# Patient Record
Sex: MALE | Race: WHITE | NOT HISPANIC OR LATINO | Employment: UNEMPLOYED | URBAN - METROPOLITAN AREA
[De-identification: names, ages, dates, MRNs, and addresses within clinical notes are randomized per-mention and may not be internally consistent; named-entity substitution may affect disease eponyms.]

---

## 2017-08-29 ENCOUNTER — PATIENT MESSAGE (OUTPATIENT)
Dept: OTOLARYNGOLOGY | Facility: CLINIC | Age: 37
End: 2017-08-29

## 2017-09-21 ENCOUNTER — OFFICE VISIT (OUTPATIENT)
Dept: INTERNAL MEDICINE | Facility: CLINIC | Age: 37
End: 2017-09-21
Payer: COMMERCIAL

## 2017-09-21 VITALS
WEIGHT: 215.81 LBS | HEIGHT: 70 IN | SYSTOLIC BLOOD PRESSURE: 130 MMHG | BODY MASS INDEX: 30.9 KG/M2 | OXYGEN SATURATION: 98 % | DIASTOLIC BLOOD PRESSURE: 80 MMHG | HEART RATE: 85 BPM

## 2017-09-21 DIAGNOSIS — M54.2 CERVICALGIA: Primary | ICD-10-CM

## 2017-09-21 PROCEDURE — 99999 PR PBB SHADOW E&M-EST. PATIENT-LVL III: CPT | Mod: PBBFAC,,, | Performed by: INTERNAL MEDICINE

## 2017-09-21 PROCEDURE — 3008F BODY MASS INDEX DOCD: CPT | Mod: S$GLB,,, | Performed by: INTERNAL MEDICINE

## 2017-09-21 PROCEDURE — 99213 OFFICE O/P EST LOW 20 MIN: CPT | Mod: S$GLB,,, | Performed by: INTERNAL MEDICINE

## 2017-09-21 NOTE — PROGRESS NOTES
Subjective:       Patient ID: Wilbur Diop is a 37 y.o. male.    Chief Complaint: Mass and Neck Pain    Pt is concerned b/c he thinks he noted a knot in R posterior neck. This has come and gone for a couple of years. I've seen him before and did not seem to be c/w lymphadenopathy. He also saw ENT who also did not feel it was concerning. He denies fever, night sweats, wt loss. He admits to some stress. Area will become larger at times and then go down but is painful. However, it does not feel like a cyst or abscess. No swallowing issues or sore throat.       Review of Systems   Constitutional: Negative for diaphoresis and fever.   HENT: Negative for trouble swallowing.    Respiratory: Negative for shortness of breath.    Cardiovascular: Negative for chest pain.   Psychiatric/Behavioral: Negative for dysphoric mood.       Objective:      Physical Exam   Constitutional: He is oriented to person, place, and time. He appears well-developed and well-nourished.   HENT:   Right Ear: Tympanic membrane, external ear and ear canal normal.   Left Ear: Tympanic membrane, external ear and ear canal normal.   Nose: No mucosal edema or rhinorrhea.   Mouth/Throat: No oropharyngeal exudate or posterior oropharyngeal erythema.   Neck: Neck supple. No thyromegaly present.   No mass apparent in neck at area of concern; no LAD   Cardiovascular: Normal rate, regular rhythm and normal heart sounds.    Pulmonary/Chest: Effort normal and breath sounds normal.   Musculoskeletal: He exhibits no edema.   Lymphadenopathy:     He has no cervical adenopathy.   Neurological: He is alert and oriented to person, place, and time.   Psychiatric: He has a normal mood and affect. His behavior is normal.       Assessment:       1. Cervicalgia        Plan:       1. Suspect this is muscular tension; advised pt to engage in massage, heat, ROM exercises and NSAIDs prn  2. Counseled on signs/symptoms that are worrisome, especially as it relates to neck  masses

## 2017-12-31 ENCOUNTER — OFFICE VISIT (OUTPATIENT)
Dept: URGENT CARE | Facility: CLINIC | Age: 37
End: 2017-12-31
Payer: COMMERCIAL

## 2017-12-31 VITALS
HEIGHT: 71 IN | OXYGEN SATURATION: 97 % | HEART RATE: 75 BPM | DIASTOLIC BLOOD PRESSURE: 82 MMHG | BODY MASS INDEX: 29.4 KG/M2 | WEIGHT: 210 LBS | SYSTOLIC BLOOD PRESSURE: 115 MMHG | TEMPERATURE: 99 F | RESPIRATION RATE: 18 BRPM

## 2017-12-31 DIAGNOSIS — J10.1 INFLUENZA A: ICD-10-CM

## 2017-12-31 DIAGNOSIS — R05.9 COUGH: Primary | ICD-10-CM

## 2017-12-31 LAB
CTP QC/QA: YES
FLUAV AG NPH QL: POSITIVE
FLUBV AG NPH QL: NEGATIVE

## 2017-12-31 PROCEDURE — 99214 OFFICE O/P EST MOD 30 MIN: CPT | Mod: S$GLB,,, | Performed by: FAMILY MEDICINE

## 2017-12-31 PROCEDURE — 87804 INFLUENZA ASSAY W/OPTIC: CPT | Mod: 59,QW,S$GLB, | Performed by: FAMILY MEDICINE

## 2017-12-31 RX ORDER — PROMETHAZINE HYDROCHLORIDE AND CODEINE PHOSPHATE 6.25; 1 MG/5ML; MG/5ML
5 SOLUTION ORAL EVERY 4 HOURS PRN
Qty: 118 ML | Refills: 0 | Status: SHIPPED | OUTPATIENT
Start: 2017-12-31 | End: 2019-01-30

## 2017-12-31 RX ORDER — OSELTAMIVIR PHOSPHATE 75 MG/1
75 CAPSULE ORAL 2 TIMES DAILY
Qty: 10 CAPSULE | Refills: 0 | Status: SHIPPED | OUTPATIENT
Start: 2017-12-31 | End: 2018-01-05

## 2017-12-31 NOTE — PROGRESS NOTES
"Subjective:       Patient ID: Wilbur Diop is a 37 y.o. male.    Vitals:  height is 5' 11" (1.803 m) and weight is 95.3 kg (210 lb). His temperature is 99.1 °F (37.3 °C). His blood pressure is 115/82 and his pulse is 75. His respiration is 18 and oxygen saturation is 97%.     Chief Complaint: Cough    Cough   This is a new problem. The current episode started yesterday. The problem has been unchanged. The problem occurs constantly. The cough is productive of sputum. Associated symptoms include chills and headaches. Pertinent negatives include no chest pain, ear pain, eye redness, fever, myalgias, sore throat, shortness of breath or wheezing. Nothing aggravates the symptoms. He has tried OTC cough suppressant for the symptoms. The treatment provided no relief.     Review of Systems   Constitution: Positive for chills and malaise/fatigue. Negative for fever.   HENT: Positive for congestion. Negative for ear pain, hoarse voice and sore throat.    Eyes: Negative for discharge and redness.   Cardiovascular: Negative for chest pain, dyspnea on exertion and leg swelling.   Respiratory: Positive for cough. Negative for shortness of breath, sputum production and wheezing.    Musculoskeletal: Negative for myalgias.   Gastrointestinal: Negative for abdominal pain and nausea.   Neurological: Positive for headaches.       Objective:      Physical Exam   Constitutional: He is oriented to person, place, and time. He appears well-developed and well-nourished. He is cooperative.  Non-toxic appearance. He appears ill. No distress.   HENT:   Head: Normocephalic and atraumatic.   Right Ear: Hearing, external ear and ear canal normal. A middle ear effusion is present.   Left Ear: Hearing, external ear and ear canal normal. A middle ear effusion is present.   Nose: Nose normal. No mucosal edema, rhinorrhea or nasal deformity. No epistaxis. Right sinus exhibits no maxillary sinus tenderness and no frontal sinus tenderness. Left sinus " exhibits no maxillary sinus tenderness and no frontal sinus tenderness.   Mouth/Throat: Uvula is midline, oropharynx is clear and moist and mucous membranes are normal. No trismus in the jaw. Normal dentition. No uvula swelling. No posterior oropharyngeal erythema.   Eyes: Conjunctivae and lids are normal. No scleral icterus.   Sclera clear bilat   Neck: Trachea normal, full passive range of motion without pain and phonation normal. Neck supple.   Cardiovascular: Normal rate, regular rhythm, normal heart sounds, intact distal pulses and normal pulses.    Pulmonary/Chest: Effort normal and breath sounds normal. No respiratory distress.   Abdominal: Soft. Normal appearance and bowel sounds are normal. He exhibits no distension. There is no tenderness.   Musculoskeletal: Normal range of motion. He exhibits no edema or deformity.   Neurological: He is alert and oriented to person, place, and time. He exhibits normal muscle tone. Coordination normal.   Skin: Skin is warm, dry and intact. He is not diaphoretic. No pallor.   Psychiatric: He has a normal mood and affect. His speech is normal and behavior is normal. Judgment and thought content normal. Cognition and memory are normal.   Nursing note and vitals reviewed.      Assessment:       1. Cough    2. Influenza A        Plan:         Cough  -     POCT Influenza A/B    Influenza A  -     oseltamivir (TAMIFLU) 75 MG capsule; Take 1 capsule (75 mg total) by mouth 2 (two) times daily.  Dispense: 10 capsule; Refill: 0  -     promethazine-codeine 6.25-10 mg/5 ml (PHENERGAN WITH CODEINE) 6.25-10 mg/5 mL syrup; Take 5 mLs by mouth every 4 (four) hours as needed.  Dispense: 118 mL; Refill: 0        Follow Up Comments   Make sure that you follow up with your primary care doctor in the next 2-5 days if needed .  Return to the Urgent Care if signs or symptoms change and certainly if you have worsening symptoms go to the nearest emergency department for further evaluation.

## 2017-12-31 NOTE — PATIENT INSTRUCTIONS
The Flu (Influenza)     The virus that causes the flu spreads through the air in droplets when someone who has the flu coughs, sneezes, laughs, or talks.   The flu (influenza) is an infection that affects your respiratory tract. This tract is made up of your mouth, nose, and lungs, and the passages between them. Unlike a cold, the flu can make you very ill. And it can lead to pneumonia, a serious lung infection. The flu can have serious complications and even cause death.  Who is at risk for the flu?  Anyone can get the flu. But you are more likely to become infected if you:  · Have a weakened immune system  · Work in a healthcare setting where you may be exposed to flu germs  · Live or work with someone who has the flu  · Havent had an annual flu shot  How does the flu spread?  The flu is caused by a virus. The virus spreads through the air in droplets when someone who has the flu coughs, sneezes, laughs, or talks. You can become infected when you inhale these viruses directly. You can also become infected when you touch a surface on which the droplets have landed and then transfer the germs to your eyes, nose, or mouth. Touching used tissues, or sharing utensils, drinking glasses, or a toothbrush from an infected person can expose you to flu viruses, too.  What are the symptoms of the flu?  Flu symptoms tend to come on quickly and may last a few days to a few weeks. They include:  · Fever usually higher than 100.4°F  (38°C) and chills  · Sore throat and headache  · Dry cough  · Runny nose  · Tiredness and weakness  · Muscle aches  Who is at risk for flu complications?  For some people, the flu can be very serious. The risk for complications is greater for:  · Children younger than age 5  · Adults ages 65 and older  · People with a chronic illness such as diabetes or heart, kidney, or lung disease  · People who live in a nursing home or long-term care facility   How is the flu treated?  The flu usually gets  better after 7 days or so. In some cases, your healthcare provider may prescribe an antiviral medicine. This may help you get well a little sooner. For the medicine to help, you need to take it as soon as possible (ideally within 48 hours) after your symptoms start. If you develop pneumonia or other serious illness, you may need to stay in the hospital.  Easing flu symptoms  · Drink lots of fluids such as water, juice, and warm soup. A good rule is to drink enough so that you urinate your normal amount.  · Get plenty of rest.  · Ask your healthcare provider what to take for fever and pain.  · Call your provider if your fever is 100.4°F (38°C) or higher, or you become dizzy, lightheaded, or short of breath.  Taking steps to protect others  · Wash your hands often, especially after coughing or sneezing. Or clean your hands with an alcohol-based hand  containing at least 60% alcohol.  · Cough or sneeze into a tissue. Then throw the tissue away and wash your hands. If you dont have a tissue, cough and sneeze into your elbow.  · Stay home until at least 24 hours after you no longer have a fever or chills. Be sure the fever isnt being hidden by fever-reducing medicine.  · Dont share food, utensils, drinking glasses, or a toothbrush with others.  · Ask your healthcare provider if others in your household should get antiviral medicine to help them avoid infection.  How can the flu be prevented?  · One of the best ways to avoid the flu is to get a flu vaccine each year. The virus that causes the flu changes from year to year. For that reason, healthcare providers recommend getting the flu vaccine each year, as soon as it's available in your area. The vaccine is given as a shot. Your healthcare provider can tell you which vaccine is right for you. A nasal spray is also available but is not recommended for the 3834-5319 flu season. The CDC says this is because the nasal spray did not seem to protect against the flu  over the last several flu seasons. In the past, it was meant for people ages 2 to 49.  · Wash your hands often. Frequent handwashing is a proven way to help prevent infection.  · Carry an alcohol-based hand gel containing at least 60% alcohol. Use it when you can't use soap and water. Then wash your hands as soon as you can.  · Avoid touching your eyes, nose, and mouth.  · At home and work, clean phones, computer keyboards, and toys often with disinfectant wipes.  · If possible, avoid close contact with others who have the flu or symptoms of the flu.  Handwashing tips  Handwashing is one of the best ways to prevent many common infections. If you are caring for or visiting someone with the flu, wash your hands each time you enter and leave the room. Follow these steps:  · Use warm water and plenty of soap. Rub your hands together well.  · Clean the whole hand, including under your nails, between your fingers, and up the wrists.  · Wash for at least 15 seconds.  · Rinse, letting the water run down your fingers, not up your wrists.  · Dry your hands well. Use a paper towel to turn off the faucet and open the door.  Using alcohol-based hand   Alcohol-based hand  are also a good choice. Use them when you can't use soap and water. Follow these steps:  · Squeeze about a tablespoon of gel into the palm of one hand.  · Rub your hands together briskly, cleaning the backs of your hands, the palms, between your fingers, and up the wrists.  · Rub until the gel is gone and your hands are completely dry.  Preventing the flu in healthcare settings  The flu is a special concern for people in hospitals and long-term care facilities. To help prevent the spread of flu, many hospitals and nursing homes take these steps:  · Healthcare providers wash their hands or use an alcohol-based hand  before and after treating each patient.  · People with the flu have private rooms and bathrooms or share a room with someone  with the same infection.  · People who are at high risk for the flu but don't have it are encouraged to get the flu and pneumonia vaccines.  · All healthcare workers are encouraged or required to get flu shots.   Date Last Reviewed: 12/1/2016 © 2000-2017 [employer]. [address] [place] [address]. All rights reserved. This information is not intended as a substitute for professional medical care. Always follow your healthcare professional's instructions.        Influenza     Viruses that cause influenza spread through the air in droplets when someone who has the flu coughs, sneezes, laughs, or talks.   Influenza (the flu) is an infection that affects your respiratory tract. This tract is made up of your mouth, nose, and lungs, and the passages between them. Unlike a cold, the flu can make you very ill. And it can lead to pneumonia, a serious lung infection. The flu can have serious complications and even be fatal for some people. These include older adults, young children, and people with certain chronic conditions.  Who is at risk for the flu?  Anyone can get the flu. But you are more likely to become infected if you:  · Have a weakened immune system  · Work in a healthcare setting where you may be exposed to flu germs  · Live or work with someone who has the flu  · Havent had an annual flu shot  How does the flu spread?  The flu is caused by viruses. The viruses spread through the air in droplets when someone who has the flu coughs, sneezes, laughs, or talks. You can become infected when you inhale these viruses directly. You can also become infected when you touch a surface on which the droplets have landed and then transfer the germs to your eyes, nose, or mouth. Touching used tissues, or sharing utensils, drinking glasses, or a toothbrush with an infected person can expose you to flu viruses, too.  What are the symptoms of the flu?  Flu symptoms tend to come on quickly and may last  a few days to a few weeks. They include:  · Fever usually higher than 100.4°F  (38°C) and chills  · Sore throat and headache  · Dry cough  · Runny nose  · Tiredness and weakness  · Muscle aches  Things that make the flu worse  For some people, the flu can be very serious. The risk for complications is greater for:  · Children younger than age 5  · Adults ages 65 and older  · People with a chronic illness such as diabetes or heart, kidney, or lung disease  · People who live in a nursing home or long-term care facility   How is the flu treated?  The flu usually gets better after 7 days or so. In some cases, your healthcare provider may prescribe an antiviral medicine. This may help you get well sooner. For the medicine to help, you need to take it as soon as possible (ideally within 48 hours) after your symptoms start. If you develop pneumonia or other serious illness, you may need to stay in the hospital.  Easing flu symptoms  · Drink lots of fluids such as water, juice, and warm soup. A good rule is to drink enough so that you urinate your normal amount.  · Get plenty of rest.  · Ask your healthcare provider what to take for fever and pain.  · Call your provider if your fever is 100.4°F (38°C) or higher, or you become dizzy, lightheaded, or short of breath.  Taking steps to protect others  · Wash your hands often, especially after coughing or sneezing. Or clean your hands with an alcohol-based hand  containing at least 60% alcohol.  · Cough or sneeze into a tissue. Then throw the tissue away and wash your hands. If you dont have a tissue, cough and sneeze into the crook of your elbow.  · Stay home until at least 24 hours after you no longer have a fever or chills. Be sure the fever isnt being hidden by fever-reducing medicine.  · Dont share food, utensils, drinking glasses, or a toothbrush with others.  · Ask your healthcare provider if others in your household should get antiviral medicine to help them  avoid infection.  How can the flu be prevented?  · One of the best ways to avoid the flu is to get a flu vaccine each year. Viruses that cause the flu change from year to year. For that reason, doctors recommend getting the flu vaccine each year, as soon as it's available in your area. The vaccine may be given as a shot or as a nasal spray. Your healthcare provider can tell you which vaccine is right for you. The nasal spray is not recommended for the 7989-8717 flu season. The CDC says this is because the nasal spray did not seem to protect against the flu over the last several flu seasons. In the past, it was meant for people ages 2 to 49.  · Wash your hands often. Frequent handwashing is a proven way to help prevent infection.  · Carry an alcohol-based hand gel containing at least 60% alcohol. Use it when you can't use soap and water. Then wash your hands as soon as you can.  · Avoid touching your eyes, nose, and mouth.  · At home and work, clean phones, computer keyboards, and toys often with disinfectant wipes.  · If possible, avoid close contact with others who have the flu or symptoms of the flu.  Handwashing tips  Handwashing is one of the best ways to prevent many common infections. If you are caring for or visiting someone with the flu, wash your hands each time you enter and leave the room. Follow these steps:  · Use warm water and plenty of soap. Rub your hands together well.  · Clean the whole hand, under your nails, between your fingers, and up the wrists.  · Wash for at least 15 seconds.  · Rinse, letting the water run down your fingers, not up your wrists.  · Dry your hands well. Use a paper towel to turn off the faucet and open the door.  Using alcohol-based hand   Alcohol-based hand  are also a good choice. Use them when you can't use soap and water. Follow these steps:  · Squeeze about a tablespoon of gel into the palm of one hand.  · Rub your hands together briskly, cleaning the  backs of your hands, the palms, between your fingers, and up the wrists.  · Rub until the gel is gone and your hands are completely dry.  Preventing influenza in healthcare settings  The flu is a special concern for people in hospitals and long-term care facilities. To help prevent the spread of flu, many hospitals and nursing homes take these steps:  · Healthcare providers wash their hands or use an alcohol-based hand  before and after treating each patient.  · People with the flu have private rooms and bathrooms or share a room with someone with the same infection.  · People at high-risk for the flu but don't have it are encouraged to get the flu and pneumonia vaccines.  · All healthcare workers are encouraged or required to get flu shots.   Date Last Reviewed: 8/27/2014  © 2014-0487 [employer]. [address] [place] [address], [place] [address]. All rights reserved. This information is not intended as a substitute for professional medical care. Always follow your healthcare professional's instructions.        Please drink plenty of fluids.  Please get plenty of rest.  Please return here or go to the Emergency Department for any concerns or worsening of condition.  Tamiflu prescription has been discussed and if prescribed, please take to completion unless you cannot tolerate the side effects.   If you were prescribed a narcotic medication, do not drive or operate heavy equipment or machinery while taking these medications.  If you were given a steroid shot in the clinic and have also been given a prescription for a steroid such as Prednisone or a Medrol Dose Pack, please begin taking them tomorrow.  If you do not have Hypertension or any history of palpitations, it is ok to take over the counter Sudafed or Mucinex D or Allegra-D or Claritin-D or Zyrtec-D.  If you do take one of the above, it is ok to combine that with plain over the counter Mucinex or Allegra or Claritin or Zyrtec.  If for example you  are taking Zyrtec -D, you can combine that with Mucinex, but not Mucinex-D.  If you are taking Mucinex-D, you can combine that with plain Allegra or Claritin or Zyrtec.   If you do have Hypertension or palpitations, it is safe to take Coricidin HBP for relief of sinus symptoms.  If not allergic, please take over the counter Tylenol (Acetaminophen) and/or Motrin (Ibuprofen) as directed for control of pain and/or fever.  Please follow up with your primary care doctor or specialist as needed.    If you  smoke, please stop smoking.

## 2017-12-31 NOTE — LETTER
December 31, 2017      [facility] [place] [address]  Phone: [phone]  Fax: [phone]       Patient: Wilbur Diop   YOB: 1980  Date of Visit: 12/31/2017    To Whom It May Concern:    Tiera Diop  was at [facility] on 12/31/2017. He may return to work/school on 01/03/2017 with no restrictions. If you have any questions or concerns, or if I can be of further assistance, please do not hesitate to contact me.    Sincerely,    Mya Martinez MD

## 2018-08-18 ENCOUNTER — HOSPITAL ENCOUNTER (EMERGENCY)
Facility: HOSPITAL | Age: 38
Discharge: HOME | End: 2018-08-18
Attending: EMERGENCY MEDICINE
Payer: COMMERCIAL

## 2018-08-18 VITALS
OXYGEN SATURATION: 98 % | DIASTOLIC BLOOD PRESSURE: 88 MMHG | RESPIRATION RATE: 20 BRPM | HEART RATE: 58 BPM | WEIGHT: 215 LBS | HEIGHT: 70 IN | BODY MASS INDEX: 30.78 KG/M2 | SYSTOLIC BLOOD PRESSURE: 131 MMHG | TEMPERATURE: 98 F
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
## 2018-08-18 PROCEDURE — 99283 EMERGENCY DEPT VISIT LOW MDM: CPT

## 2018-08-18 PROCEDURE — 99282 EMERGENCY DEPT VISIT SF MDM: CPT | Mod: ,,, | Performed by: EMERGENCY MEDICINE

## 2018-08-18 NOTE — ED PROVIDER NOTES
"Encounter Date: 2018    SCRIBE #1 NOTE: I, Son Cathryn, am scribing for, and in the presence of,  Dr. High. I have scribed the entire note.       History     Chief Complaint   Patient presents with    Insect Bite     insect bite to back of right leg with moderate pain. pt states its very tender and thinks its a spider. pt is aox 3.      Time patient was seen by the provider: 6:13 AM      The patient is a 38 y.o. male with co-morbidities including: allergy who presents to the ED with a complaint of an insect bite on his right leg that itches. Pt is a . He notes that he fell and an insect bit him. Last night, he reports of having the spot of being "hard as a rock."        The history is provided by the patient and medical records.     Review of patient's allergies indicates:  No Known Allergies  Past Medical History:   Diagnosis Date    Allergy     Anxiety     Depression      Past Surgical History:   Procedure Laterality Date    APPENDECTOMY      HERNIA REPAIR       Family History   Problem Relation Age of Onset    Depression Mother     No Known Problems Father     No Known Problems Sister     No Known Problems Brother     No Known Problems Maternal Aunt     No Known Problems Maternal Uncle     No Known Problems Paternal Aunt     No Known Problems Paternal Uncle     No Known Problems Maternal Grandmother     No Known Problems Maternal Grandfather     No Known Problems Paternal Grandmother     No Known Problems Paternal Grandfather     Amblyopia Neg Hx     Blindness Neg Hx     Cancer Neg Hx     Cataracts Neg Hx     Diabetes Neg Hx     Glaucoma Neg Hx     Hypertension Neg Hx     Macular degeneration Neg Hx     Retinal detachment Neg Hx     Strabismus Neg Hx     Stroke Neg Hx     Thyroid disease Neg Hx      Social History     Tobacco Use    Smoking status: Former Smoker     Last attempt to quit: 10/22/2004     Years since quittin.8    Smokeless tobacco: Never Used "   Substance Use Topics    Alcohol use: Yes     Alcohol/week: 1.0 oz     Types: 2 Standard drinks or equivalent per week    Drug use: No     Review of Systems   Constitutional: Negative for chills, diaphoresis and fever.   HENT: Negative for facial swelling.    Eyes: Negative for visual disturbance.   Respiratory: Negative for shortness of breath.    Cardiovascular: Negative for chest pain.   Gastrointestinal: Negative for abdominal pain.   Genitourinary: Negative for dysuria.   Musculoskeletal: Negative for back pain.   Skin:        Positive erythema on right leg   Neurological: Negative for headaches.       Physical Exam     Initial Vitals   BP Pulse Resp Temp SpO2   08/18/18 0459 08/18/18 0459 08/18/18 0459 08/18/18 0604 08/18/18 0459   131/88 (!) 58 20 98.4 °F (36.9 °C) 98 %      MAP       --                Physical Exam    Nursing note and vitals reviewed.  Constitutional: He appears well-nourished.   HENT:   Mouth/Throat: Mucous membranes are normal.   Musculoskeletal: He exhibits tenderness (Mild tenderness right leg ).   Skin: There is erythema (4cm x 5cm ).   Negative for fluctuance, drainage, warmth         ED Course   Procedures  Labs Reviewed - No data to display       Imaging Results    None          Medical Decision Making:   Initial Assessment:   37 yo m, healthy, here with R lower leg insect bite, pronounced allergic response but no signs infection/cellulitis/abscess at this time  ED Management:  Supportive care  OTC hydrocortisone and benadryl            Scribe Attestation:   Scribe #1: I performed the above scribed service and the documentation accurately describes the services I performed. I attest to the accuracy of the note.               Clinical Impression:   The encounter diagnosis was Insect bite, initial encounter.      Disposition:   Disposition: Discharged  Condition: Stable       I, Dr. Gillian High, personally performed the services described in this documentation. All medical record  entries made by the scribe were at my direction and in my presence.  I have reviewed the chart and agree that the record reflects my personal performance and is accurate and complete. Gillian High MD.  6:35 AM 08/19/2018                     Gillian High MD  08/19/18 0807

## 2018-08-18 NOTE — ED TRIAGE NOTES
Pt presents to the ED with c/o insect bite to back of right leg with moderate pain. 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 Pt denies fever/chills, N/V/D, chest pain, SOB. Pt is AOx4.     Patient identifiers verified and correct  LOC: The patient is awake, alert and aware of environment with an appropriate affect, the patient is oriented x 3 and speaking appropriately.   APPEARANCE: Patient appears comfortable and in no acute distress, patient is clean and well groomed.  SKIN: The skin is warm and dry, color consistent with ethnicity, patient has normal skin turgor and moist mucus membranes, skin intact, no breakdown or bruising noted.   MUSCULOSKELETAL: Patient moving all extremities spontaneously, no swelling noted. Insect bite to back of right leg.  RESPIRATORY: Airway is open and patent, respirations are spontaneous, patient has a normal effort and rate, no accessory muscle use noted

## 2018-11-04 ENCOUNTER — OFFICE VISIT (OUTPATIENT)
Dept: URGENT CARE | Facility: CLINIC | Age: 38
End: 2018-11-04
Payer: COMMERCIAL

## 2018-11-04 VITALS
OXYGEN SATURATION: 97 % | DIASTOLIC BLOOD PRESSURE: 87 MMHG | RESPIRATION RATE: 18 BRPM | BODY MASS INDEX: 31.5 KG/M2 | SYSTOLIC BLOOD PRESSURE: 133 MMHG | WEIGHT: 220 LBS | HEIGHT: 70 IN | TEMPERATURE: 99 F | HEART RATE: 76 BPM

## 2018-11-04 DIAGNOSIS — J06.9 URI, ACUTE: ICD-10-CM

## 2018-11-04 DIAGNOSIS — H65.07 RECURRENT ACUTE SEROUS OTITIS MEDIA, UNSPECIFIED LATERALITY: Primary | ICD-10-CM

## 2018-11-04 DIAGNOSIS — J30.1 SEASONAL ALLERGIC RHINITIS DUE TO POLLEN: ICD-10-CM

## 2018-11-04 PROCEDURE — 3008F BODY MASS INDEX DOCD: CPT | Mod: CPTII,S$GLB,, | Performed by: FAMILY MEDICINE

## 2018-11-04 PROCEDURE — 99213 OFFICE O/P EST LOW 20 MIN: CPT | Mod: S$GLB,,, | Performed by: FAMILY MEDICINE

## 2018-11-04 RX ORDER — AMOXICILLIN 875 MG/1
875 TABLET, FILM COATED ORAL 2 TIMES DAILY
Qty: 20 TABLET | Refills: 0 | Status: SHIPPED | OUTPATIENT
Start: 2018-11-04 | End: 2018-11-14

## 2018-11-04 NOTE — PROGRESS NOTES
"Subjective:       Patient ID: Wilbur Diop is a 38 y.o. male.    Vitals:  height is 5' 10" (1.778 m) and weight is 99.8 kg (220 lb). His tympanic temperature is 98.7 °F (37.1 °C). His blood pressure is 133/87 and his pulse is 76. His respiration is 18 and oxygen saturation is 97%.     Chief Complaint: Sinus Problem (2 weeks)    Pt went to his dr last Friday he was given a steroid shot and amox, but didn't get the rx filled      Sinus Problem   This is a new problem. The current episode started in the past 7 days. There has been no fever. He is experiencing no pain. Associated symptoms include congestion, ear pain, headaches, sinus pressure and sneezing. Pertinent negatives include no chills, coughing, hoarse voice, shortness of breath or sore throat. Past treatments include acetaminophen and oral decongestants. The treatment provided moderate relief.     Review of Systems   Constitution: Negative for chills, fever and malaise/fatigue.   HENT: Positive for congestion, ear pain, sinus pressure and sneezing. Negative for hoarse voice and sore throat.    Eyes: Negative for discharge and redness.   Cardiovascular: Negative for chest pain, dyspnea on exertion and leg swelling.   Respiratory: Negative for cough, shortness of breath, sputum production and wheezing.    Musculoskeletal: Negative for myalgias.   Gastrointestinal: Positive for nausea. Negative for abdominal pain.   Neurological: Positive for headaches.       Objective:      Physical Exam   Constitutional: He is oriented to person, place, and time. He appears well-developed and well-nourished. He is cooperative.  Non-toxic appearance. He does not appear ill. No distress.   HENT:   Head: Normocephalic and atraumatic.   Right Ear: Hearing, tympanic membrane, external ear and ear canal normal.   Left Ear: Hearing, tympanic membrane, external ear and ear canal normal.   Nose: Nose normal. No mucosal edema, rhinorrhea or nasal deformity. No epistaxis. Right sinus " exhibits no maxillary sinus tenderness and no frontal sinus tenderness. Left sinus exhibits no maxillary sinus tenderness and no frontal sinus tenderness.   Mouth/Throat: Uvula is midline, oropharynx is clear and moist and mucous membranes are normal. No trismus in the jaw. Normal dentition. No uvula swelling. No posterior oropharyngeal erythema.   TMs fluid, no erythema     Eyes: Conjunctivae and lids are normal. Right eye exhibits no discharge. Left eye exhibits no discharge. No scleral icterus.   Sclera clear bilat   Neck: Trachea normal, normal range of motion, full passive range of motion without pain and phonation normal. Neck supple.   Cardiovascular: Normal rate, regular rhythm, normal heart sounds, intact distal pulses and normal pulses.   Pulmonary/Chest: Effort normal and breath sounds normal. No respiratory distress.   Abdominal: Soft. Normal appearance and bowel sounds are normal. He exhibits no distension, no pulsatile midline mass and no mass. There is no tenderness.   Musculoskeletal: Normal range of motion. He exhibits no edema or deformity.   Neurological: He is alert and oriented to person, place, and time. He exhibits normal muscle tone. Coordination normal.   Skin: Skin is warm, dry and intact. He is not diaphoretic. No pallor.   Psychiatric: He has a normal mood and affect. His speech is normal and behavior is normal. Judgment and thought content normal. Cognition and memory are normal.   Nursing note and vitals reviewed.      Assessment:       1. Recurrent acute serous otitis media, unspecified laterality    2. URI, acute    3. Seasonal allergic rhinitis due to pollen        Plan:         Recurrent acute serous otitis media, unspecified laterality    URI, acute    Seasonal allergic rhinitis due to pollen    Other orders  -     amoxicillin (AMOXIL) 875 MG tablet; Take 1 tablet (875 mg total) by mouth 2 (two) times daily. for 10 days  Dispense: 20 tablet; Refill: 0

## 2018-11-21 ENCOUNTER — TELEPHONE (OUTPATIENT)
Dept: INTERNAL MEDICINE | Facility: CLINIC | Age: 38
End: 2018-11-21

## 2018-12-24 PROBLEM — J10.1 INFLUENZA A: Status: RESOLVED | Noted: 2017-12-31 | Resolved: 2018-12-24

## 2019-01-30 ENCOUNTER — LAB VISIT (OUTPATIENT)
Dept: LAB | Facility: OTHER | Age: 39
End: 2019-01-30
Attending: INTERNAL MEDICINE
Payer: COMMERCIAL

## 2019-01-30 ENCOUNTER — OFFICE VISIT (OUTPATIENT)
Dept: INTERNAL MEDICINE | Facility: CLINIC | Age: 39
End: 2019-01-30
Payer: COMMERCIAL

## 2019-01-30 VITALS
HEART RATE: 72 BPM | HEIGHT: 70 IN | SYSTOLIC BLOOD PRESSURE: 120 MMHG | BODY MASS INDEX: 31.37 KG/M2 | WEIGHT: 219.13 LBS | DIASTOLIC BLOOD PRESSURE: 78 MMHG

## 2019-01-30 DIAGNOSIS — B35.4 TINEA CORPORIS: ICD-10-CM

## 2019-01-30 DIAGNOSIS — F90.0 ATTENTION DEFICIT HYPERACTIVITY DISORDER (ADHD), PREDOMINANTLY INATTENTIVE TYPE: ICD-10-CM

## 2019-01-30 DIAGNOSIS — Z00.00 WELLNESS EXAMINATION: ICD-10-CM

## 2019-01-30 DIAGNOSIS — F32.0 CURRENT MILD EPISODE OF MAJOR DEPRESSIVE DISORDER WITHOUT PRIOR EPISODE: ICD-10-CM

## 2019-01-30 DIAGNOSIS — Z00.00 WELLNESS EXAMINATION: Primary | ICD-10-CM

## 2019-01-30 LAB
ALBUMIN SERPL BCP-MCNC: 4 G/DL
ALP SERPL-CCNC: 54 U/L
ALT SERPL W/O P-5'-P-CCNC: 33 U/L
ANION GAP SERPL CALC-SCNC: 11 MMOL/L
AST SERPL-CCNC: 20 U/L
BASOPHILS # BLD AUTO: 0.03 K/UL
BASOPHILS NFR BLD: 0.4 %
BILIRUB SERPL-MCNC: 0.4 MG/DL
BUN SERPL-MCNC: 20 MG/DL
CALCIUM SERPL-MCNC: 9.4 MG/DL
CHLORIDE SERPL-SCNC: 104 MMOL/L
CHOLEST SERPL-MCNC: 205 MG/DL
CHOLEST/HDLC SERPL: 3.9 {RATIO}
CO2 SERPL-SCNC: 25 MMOL/L
CREAT SERPL-MCNC: 0.9 MG/DL
DIFFERENTIAL METHOD: NORMAL
EOSINOPHIL # BLD AUTO: 0.3 K/UL
EOSINOPHIL NFR BLD: 3.4 %
ERYTHROCYTE [DISTWIDTH] IN BLOOD BY AUTOMATED COUNT: 12.8 %
EST. GFR  (AFRICAN AMERICAN): >60 ML/MIN/1.73 M^2
EST. GFR  (NON AFRICAN AMERICAN): >60 ML/MIN/1.73 M^2
ESTIMATED AVG GLUCOSE: 117 MG/DL
GLUCOSE SERPL-MCNC: 106 MG/DL
HBA1C MFR BLD HPLC: 5.7 %
HCT VFR BLD AUTO: 46 %
HDLC SERPL-MCNC: 52 MG/DL
HDLC SERPL: 25.4 %
HGB BLD-MCNC: 15.5 G/DL
LDLC SERPL CALC-MCNC: 139.8 MG/DL
LYMPHOCYTES # BLD AUTO: 2.2 K/UL
LYMPHOCYTES NFR BLD: 30.3 %
MCH RBC QN AUTO: 29.8 PG
MCHC RBC AUTO-ENTMCNC: 33.7 G/DL
MCV RBC AUTO: 88 FL
MONOCYTES # BLD AUTO: 0.8 K/UL
MONOCYTES NFR BLD: 10.7 %
NEUTROPHILS # BLD AUTO: 4 K/UL
NEUTROPHILS NFR BLD: 54.9 %
NONHDLC SERPL-MCNC: 153 MG/DL
PLATELET # BLD AUTO: 317 K/UL
PMV BLD AUTO: 10.3 FL
POTASSIUM SERPL-SCNC: 4.1 MMOL/L
PROT SERPL-MCNC: 7.4 G/DL
RBC # BLD AUTO: 5.21 M/UL
SODIUM SERPL-SCNC: 140 MMOL/L
TRIGL SERPL-MCNC: 66 MG/DL
WBC # BLD AUTO: 7.27 K/UL

## 2019-01-30 PROCEDURE — 99396 PR PREVENTIVE VISIT,EST,40-64: ICD-10-PCS | Mod: 25,S$GLB,, | Performed by: INTERNAL MEDICINE

## 2019-01-30 PROCEDURE — 80061 LIPID PANEL: CPT

## 2019-01-30 PROCEDURE — 90471 IMMUNIZATION ADMIN: CPT | Mod: S$GLB,,, | Performed by: INTERNAL MEDICINE

## 2019-01-30 PROCEDURE — 99396 PREV VISIT EST AGE 40-64: CPT | Mod: 25,S$GLB,, | Performed by: INTERNAL MEDICINE

## 2019-01-30 PROCEDURE — 83036 HEMOGLOBIN GLYCOSYLATED A1C: CPT

## 2019-01-30 PROCEDURE — 80053 COMPREHEN METABOLIC PANEL: CPT

## 2019-01-30 PROCEDURE — 90471 FLU VACCINE (QUAD) GREATER THAN OR EQUAL TO 3YO PRESERVATIVE FREE IM: ICD-10-PCS | Mod: S$GLB,,, | Performed by: INTERNAL MEDICINE

## 2019-01-30 PROCEDURE — 99999 PR PBB SHADOW E&M-EST. PATIENT-LVL III: CPT | Mod: PBBFAC,,, | Performed by: INTERNAL MEDICINE

## 2019-01-30 PROCEDURE — 36415 COLL VENOUS BLD VENIPUNCTURE: CPT

## 2019-01-30 PROCEDURE — 90686 IIV4 VACC NO PRSV 0.5 ML IM: CPT | Mod: S$GLB,,, | Performed by: INTERNAL MEDICINE

## 2019-01-30 PROCEDURE — 99999 PR PBB SHADOW E&M-EST. PATIENT-LVL III: ICD-10-PCS | Mod: PBBFAC,,, | Performed by: INTERNAL MEDICINE

## 2019-01-30 PROCEDURE — 85025 COMPLETE CBC W/AUTO DIFF WBC: CPT

## 2019-01-30 PROCEDURE — 90686 FLU VACCINE (QUAD) GREATER THAN OR EQUAL TO 3YO PRESERVATIVE FREE IM: ICD-10-PCS | Mod: S$GLB,,, | Performed by: INTERNAL MEDICINE

## 2019-01-30 RX ORDER — KETOCONAZOLE 20 MG/G
CREAM TOPICAL 2 TIMES DAILY
Qty: 60 G | Refills: 1 | Status: SHIPPED | OUTPATIENT
Start: 2019-01-30 | End: 2020-03-19

## 2019-01-30 RX ORDER — KETOCONAZOLE 20 MG/G
CREAM TOPICAL 2 TIMES DAILY
Qty: 60 G | Refills: 1 | Status: SHIPPED | OUTPATIENT
Start: 2019-01-30 | End: 2019-01-30

## 2019-01-30 NOTE — PROGRESS NOTES
Subjective:       Patient ID: Wilbur Diop is a 38 y.o. male.    Chief Complaint: Annual Exam    Pt here for annual exam. Counseled on exercise goals.    Lexapro works well for depression. No SI/HI.    ADHD doing well with adderall and not having side effects. This is Rx by psychiatry. Specifically denies cp/sob/palpitaitons/anorexia/wt loss/insomnia. LA  reviewed and is consistent. It is effective for his ADHD symptoms.         Review of Systems   Constitutional: Negative for appetite change, fatigue, fever and unexpected weight change.   HENT: Negative for congestion, rhinorrhea and sore throat.    Eyes: Negative for visual disturbance.   Respiratory: Negative for cough and shortness of breath.    Cardiovascular: Negative for chest pain, palpitations and leg swelling.   Gastrointestinal: Negative for abdominal pain, blood in stool, constipation and diarrhea.   Genitourinary: Negative for difficulty urinating and dysuria.   Skin: Negative for color change and rash.   Neurological: Negative for dizziness and syncope.   Hematological: Negative for adenopathy.   Psychiatric/Behavioral: Negative for dysphoric mood.       Objective:      Physical Exam   Constitutional: He is oriented to person, place, and time. He appears well-developed and well-nourished.   HENT:   Head: Normocephalic and atraumatic.   Right Ear: External ear normal.   Left Ear: External ear normal.   Mouth/Throat: Oropharynx is clear and moist. No oropharyngeal exudate.   Eyes: Conjunctivae and EOM are normal. Pupils are equal, round, and reactive to light.   Neck: Neck supple. Carotid bruit is not present. No thyromegaly present.   Cardiovascular: Normal rate, regular rhythm, S1 normal, S2 normal, normal heart sounds and intact distal pulses.   Pulmonary/Chest: Effort normal and breath sounds normal.   Abdominal: Soft. Bowel sounds are normal. He exhibits no mass. There is no hepatosplenomegaly. There is no tenderness.   Musculoskeletal: He  exhibits no edema.   Lymphadenopathy:     He has no cervical adenopathy.   Neurological: He is alert and oriented to person, place, and time. No cranial nerve deficit.   Skin:   Macular erythematous patch on chest with slight scale and central clearing c/w tinea   Psychiatric: He has a normal mood and affect. His behavior is normal.       Assessment:       1. Wellness examination    2. Attention deficit hyperactivity disorder (ADHD), predominantly inattentive type    3. Current mild episode of major depressive disorder without prior episode    4. Tinea corporis        Plan:       1. Appropriate labs  2. nizoral cream  3. Keep f/u with psychiatry

## 2019-01-30 NOTE — PROGRESS NOTES
"Patient was given vaccine information sheet for the Flu Vaccine. The area of injection was palpated using the acromion process as a landmark. This area was cleaned with alcohol. Using a 25g 1" safety needle, 0.5mL of the vaccine was placed into the left deltoid muscle. The injection site was dressed with a bandage. Patient experienced no complications and was discharged in stable condition. Fluzone vaccine Lot: [id number]   Exp: [id number]    "

## 2019-09-17 ENCOUNTER — OFFICE VISIT (OUTPATIENT)
Dept: INTERNAL MEDICINE | Facility: CLINIC | Age: 39
End: 2019-09-17
Payer: COMMERCIAL

## 2019-09-17 VITALS
HEART RATE: 87 BPM | HEIGHT: 70 IN | DIASTOLIC BLOOD PRESSURE: 88 MMHG | OXYGEN SATURATION: 98 % | WEIGHT: 207.44 LBS | SYSTOLIC BLOOD PRESSURE: 130 MMHG | BODY MASS INDEX: 29.7 KG/M2

## 2019-09-17 DIAGNOSIS — G56.03 BILATERAL CARPAL TUNNEL SYNDROME: Primary | ICD-10-CM

## 2019-09-17 DIAGNOSIS — F90.0 ATTENTION DEFICIT HYPERACTIVITY DISORDER (ADHD), PREDOMINANTLY INATTENTIVE TYPE: ICD-10-CM

## 2019-09-17 DIAGNOSIS — F32.0 CURRENT MILD EPISODE OF MAJOR DEPRESSIVE DISORDER WITHOUT PRIOR EPISODE: ICD-10-CM

## 2019-09-17 PROCEDURE — 99999 PR PBB SHADOW E&M-EST. PATIENT-LVL IV: CPT | Mod: PBBFAC,,, | Performed by: INTERNAL MEDICINE

## 2019-09-17 PROCEDURE — 99213 OFFICE O/P EST LOW 20 MIN: CPT | Mod: S$GLB,,, | Performed by: INTERNAL MEDICINE

## 2019-09-17 PROCEDURE — 3008F PR BODY MASS INDEX (BMI) DOCUMENTED: ICD-10-PCS | Mod: CPTII,S$GLB,, | Performed by: INTERNAL MEDICINE

## 2019-09-17 PROCEDURE — 99999 PR PBB SHADOW E&M-EST. PATIENT-LVL IV: ICD-10-PCS | Mod: PBBFAC,,, | Performed by: INTERNAL MEDICINE

## 2019-09-17 PROCEDURE — 99213 PR OFFICE/OUTPT VISIT, EST, LEVL III, 20-29 MIN: ICD-10-PCS | Mod: S$GLB,,, | Performed by: INTERNAL MEDICINE

## 2019-09-17 PROCEDURE — 3008F BODY MASS INDEX DOCD: CPT | Mod: CPTII,S$GLB,, | Performed by: INTERNAL MEDICINE

## 2019-09-17 NOTE — PROGRESS NOTES
Subjective:       Patient ID: Wilbur Diop is a 39 y.o. male.    Chief Complaint: Numbness (Fingertips, especially at night)    Pt c/o fingertips that are numb; especially worse at night. It involves bilat 1st-3rd digits. No pain. 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 Symptoms have been occurring for 6-7 months; was not sudden in onset. He has not taken anything for it nor used any braces. No neck pain.     Pt reports increase in depression. He was seeing psychiatry for this and ADHD and on meds but decided on his own to stop adderall b/c he didn't like the way it made him feel. He has not yet d/w psychiatry and would like to consider changing providers. He is also open to psychology. No SI/HI.            Review of Systems   Constitutional: Negative for fever.   Respiratory: Negative for shortness of breath.    Cardiovascular: Negative for chest pain.   Neurological: Positive for weakness and numbness.   Psychiatric/Behavioral: Positive for dysphoric mood. The patient is nervous/anxious.        Objective:      Physical Exam   Constitutional: He is oriented to person, place, and time. He appears well-developed and well-nourished.   Neck: Neck supple. No thyromegaly present.   Cardiovascular: Normal rate, regular rhythm and normal heart sounds.   Pulmonary/Chest: Effort normal and breath sounds normal.   Musculoskeletal:   No muscular atrophy in hands; strength normal and intact throughout hands; positive Tinel bilat wrists   Lymphadenopathy:     He has no cervical adenopathy.   Neurological: He is alert and oriented to person, place, and time.   Psychiatric: He has a normal mood and affect. His behavior is normal.       Assessment:       1. Bilateral carpal tunnel syndrome    2. Current mild episode of major depressive disorder without prior episode    3. Attention deficit hyperactivity disorder (ADHD), predominantly inattentive type        Plan:       1. Psychiatry referral  2. Ortho hand  referral  3. Will try wrist splints first and use NSAIDs cautiously prn

## 2019-09-18 ENCOUNTER — PATIENT OUTREACH (OUTPATIENT)
Dept: ADMINISTRATIVE | Facility: OTHER | Age: 39
End: 2019-09-18

## 2019-09-19 ENCOUNTER — TELEPHONE (OUTPATIENT)
Dept: ORTHOPEDICS | Facility: CLINIC | Age: 39
End: 2019-09-19

## 2019-09-19 ENCOUNTER — PATIENT MESSAGE (OUTPATIENT)
Dept: ORTHOPEDICS | Facility: CLINIC | Age: 39
End: 2019-09-19

## 2019-09-19 DIAGNOSIS — M79.641 BILATERAL HAND PAIN: Primary | ICD-10-CM

## 2019-09-19 DIAGNOSIS — M79.642 BILATERAL HAND PAIN: Primary | ICD-10-CM

## 2019-09-23 ENCOUNTER — OFFICE VISIT (OUTPATIENT)
Dept: URGENT CARE | Facility: CLINIC | Age: 39
End: 2019-09-23
Payer: COMMERCIAL

## 2019-09-23 VITALS
SYSTOLIC BLOOD PRESSURE: 138 MMHG | HEIGHT: 70 IN | TEMPERATURE: 99 F | WEIGHT: 207 LBS | RESPIRATION RATE: 18 BRPM | HEART RATE: 63 BPM | DIASTOLIC BLOOD PRESSURE: 88 MMHG | OXYGEN SATURATION: 98 % | BODY MASS INDEX: 29.63 KG/M2

## 2019-09-23 DIAGNOSIS — H65.91 OTITIS MEDIA WITH EFFUSION, RIGHT: Primary | ICD-10-CM

## 2019-09-23 DIAGNOSIS — Z76.0 MEDICATION REFILL: ICD-10-CM

## 2019-09-23 PROCEDURE — 96372 THER/PROPH/DIAG INJ SC/IM: CPT | Mod: S$GLB,,, | Performed by: PHYSICIAN ASSISTANT

## 2019-09-23 PROCEDURE — 96372 PR INJECTION,THERAP/PROPH/DIAG2ST, IM OR SUBCUT: ICD-10-PCS | Mod: S$GLB,,, | Performed by: PHYSICIAN ASSISTANT

## 2019-09-23 PROCEDURE — 3008F BODY MASS INDEX DOCD: CPT | Mod: CPTII,S$GLB,, | Performed by: PHYSICIAN ASSISTANT

## 2019-09-23 PROCEDURE — 99213 OFFICE O/P EST LOW 20 MIN: CPT | Mod: 25,S$GLB,, | Performed by: PHYSICIAN ASSISTANT

## 2019-09-23 PROCEDURE — 99213 PR OFFICE/OUTPT VISIT, EST, LEVL III, 20-29 MIN: ICD-10-PCS | Mod: 25,S$GLB,, | Performed by: PHYSICIAN ASSISTANT

## 2019-09-23 PROCEDURE — 3008F PR BODY MASS INDEX (BMI) DOCUMENTED: ICD-10-PCS | Mod: CPTII,S$GLB,, | Performed by: PHYSICIAN ASSISTANT

## 2019-09-23 RX ORDER — BUPROPION HYDROCHLORIDE 300 MG/1
TABLET ORAL
Qty: 30 TABLET | Refills: 0 | Status: SHIPPED | OUTPATIENT
Start: 2019-09-23 | End: 2020-03-19

## 2019-09-23 RX ORDER — ESCITALOPRAM OXALATE 20 MG/1
40 TABLET ORAL DAILY
Qty: 180 TABLET | Refills: 0 | Status: SHIPPED | OUTPATIENT
Start: 2019-09-23 | End: 2019-12-27

## 2019-09-23 RX ORDER — BETAMETHASONE SODIUM PHOSPHATE AND BETAMETHASONE ACETATE 3; 3 MG/ML; MG/ML
6 INJECTION, SUSPENSION INTRA-ARTICULAR; INTRALESIONAL; INTRAMUSCULAR; SOFT TISSUE
Status: COMPLETED | OUTPATIENT
Start: 2019-09-23 | End: 2019-09-23

## 2019-09-23 RX ORDER — OXYMETAZOLINE HCL 0.05 %
2 SPRAY, NON-AEROSOL (ML) NASAL 2 TIMES DAILY
Qty: 15 ML | Refills: 0 | Status: SHIPPED | OUTPATIENT
Start: 2019-09-23 | End: 2019-10-03

## 2019-09-23 RX ADMIN — BETAMETHASONE SODIUM PHOSPHATE AND BETAMETHASONE ACETATE 6 MG: 3; 3 INJECTION, SUSPENSION INTRA-ARTICULAR; INTRALESIONAL; INTRAMUSCULAR; SOFT TISSUE at 06:09

## 2019-09-23 NOTE — PATIENT INSTRUCTIONS
Earache, No Infection (Adult)  Earaches can happen without an infection. This occurs when air and fluid build up behind the eardrum causing a feeling of fullness and discomfort and reduced hearing. This is called otitis media with effusion (OME) or serous otitis media. It means there is fluid in the middle ear. It is not the same as acute otitis media, which is typically from infection.  OME can happen when you have a cold if congestion blocks the passage that drains the middle ear. This passage is called the eustachian tube. OME may also occur with nasal allergies or after a bacterial middle ear infection.    The pain or discomfort may come and go. You may hear clicking or popping sounds when you chew or swallow. You may feel that your balance is off. Or you may hear ringing in the ear.  It often takes from several weeks up to 3 months for the fluid to clear on its own. Oral pain relievers and ear drops help if there is pain. Decongestants and antihistamines sometimes help. Antibiotics don't help since there is no infection. Your doctor may prescribe a nasal spray to help reduce swelling in the nose and eustachian tube. This can allow the ear to drain.  If your OME doesn't improve after 3 months, surgery may be used to drain the fluid and insert a small tube in the eardrum to allow continued drainage.  Because the middle ear fluid can become infected, it is important to watch for signs of an ear infection which may develop later. These signs include increased ear pain, fever, or drainage from the ear.  Home care  The following guidelines will help you care for yourself at home:  · You may use over-the-counter medicine as directed to control pain, unless another medicine was prescribed. If you have chronic liver or kidney disease or ever had a stomach ulcer or GI bleeding, talk with your doctor before using these medicines. Aspirin should never be used in anyone under 18 years of age who is ill with a fever. It  may cause severe liver damage.  · You may use over-the-counter decongestants such as phenylephrine or pseudoephedrine. But they are not always helpful. Don't use nasal spray decongestants more than 3 days. Longer use can make congestion worse. Prescription nasal sprays from your doctor don't typically have those restrictions.  · Antihistamines may help if you are also having allergy symptoms.  · You may use medicines such as guaifenesin to thin mucus and promote drainage.  Follow-up care  Follow up with your healthcare provider or as advised if you are not feeling better after 3 days.  When to seek medical advice  Call your healthcare provider right away if any of the following occur:  · Your ear pain gets worse or does not start to improve   · Fever of 100.4°F (38°C) or higher, or as directed by your healthcare provider  · Fluid or blood draining from the ear  · Headache or sinus pain  · Stiff neck  · Unusual drowsiness or confusion  Date Last Reviewed: 10/1/2016  © 9261-7196 [employer]. [address] [place] [address]. All rights reserved. This information is not intended as a substitute for professional medical care. Always follow your healthcare professional's instructions.        Depression: Tips to Help Yourself  As your healthcare providers help treat your depression, you can also help yourself. Keep in mind that your illness affects you emotionally, physically, mentally, and socially. So full recovery will take time. Take care of your body and your soul, and be patient with yourself as you get better.    Self-care  · Educate yourself. Read about treatment and medicine options. If you have the energy, attend local conferences or support groups. Keep a list of useful websites and helpful books and use them as needed. This illness is not your fault. Dont blame yourself for your depression.  · Manage early symptoms. If you notice symptoms returning, experience triggers, or identify  other factors that may lead to a depressive episode, get help as soon as possible. Ask trusted friends and family to monitor your behavior and let you know if they see anything of concern.  · Work with your provider. Find a provider you can trust. Communicate honestly with that person and share information on your treatment for depression and your reaction to medicines.  · Be prepared for a crisis. Know what to do if you experience a crisis. Keep the phone number of a crisis hotline and know the location of your community's urgent care centers and the closest emergency department.  · Hold off on big decisions. Depression can cloud your judgment. So wait until you feel better before making major life decisions, such as changing jobs, moving, or getting  or .  · Be patient. Recovering from depression is a process. Dont be discouraged if it takes some time to feel better.  · Keep it simple. Depression saps your energy and concentration. So you wont be able to do all the things you used to do. Set small goals and do what you can.  · Be with others. Dont isolate yourself--youll only feel worse. Try to be with other people. And take part in fun activities when you can. Go to a movie, ballgame, [employer] service, or social event. Talk openly with people you can trust. And accept help when its offered.  Take care of your body  People with depression often lose the desire to take care of themselves. That only makes their problems worse. During treatment and afterward, make a point to:  · Exercise. Its a great way to take care of your body. And studies have shown that exercise helps fight depression.  · Avoid drugs and alcohol. These may ease the pain in the short term. But theyll only make your problems worse in the long run.  · Get relief from stress. Ask your healthcare provider for relaxation exercises and techniques to help relieve stress.  · Eat right. A balanced and healthy diet helps keep your  body healthy.  Date Last Reviewed: 1/1/2017  © 5590-1526 [employer]. [address] [place] [address], [place] [address]. All rights reserved. This information is not intended as a substitute for professional medical care. Always follow your healthcare professional's instructions.    Please follow up with your Primary care provider within 2-5 days if your signs and symptoms have not resolved or worsen.     If your condition worsens or fails to improve we recommend that you receive another evaluation at the emergency room immediately or contact your primary medical clinic to discuss your concerns.   You must understand that you have received an Urgent Care treatment only and that you may be released before all of your medical problems are known or treated. You, the patient, will arrange for follow up care as instructed.     RED FLAGS/WARNING SYMPTOMS DISCUSSED WITH PATIENT THAT WOULD WARRANT EMERGENT MEDICAL ATTENTION. PATIENT VERBALIZED UNDERSTANDING.

## 2019-09-23 NOTE — PROGRESS NOTES
"Subjective:       Patient ID: Wilbur Diop is a 39 y.o. male.    Vitals:  height is 5' 10" (1.778 m) and weight is 93.9 kg (207 lb). His temperature is 99 °F (37.2 °C). His blood pressure is 138/88 and his pulse is 63. His respiration is 18 and oxygen saturation is 98%.     Chief Complaint: Medication Refill    Pt need med  Refill.    Medication Refill   This is a new problem. The current episode started today. The problem occurs constantly. The problem has been unchanged. Pertinent negatives include no arthralgias, chest pain, chills, congestion, coughing, fatigue, fever, headaches, joint swelling, myalgias, nausea, rash, sore throat, vertigo or vomiting. Nothing aggravates the symptoms. He has tried nothing for the symptoms.       Constitution: Negative for chills, fatigue and fever.   HENT: Negative for congestion and sore throat.    Neck: Negative for painful lymph nodes.   Cardiovascular: Negative for chest pain and leg swelling.   Eyes: Negative for double vision and blurred vision.   Respiratory: Negative for cough and shortness of breath.    Gastrointestinal: Negative for nausea, vomiting and diarrhea.   Genitourinary: Negative for dysuria, frequency and urgency.   Musculoskeletal: Negative for joint pain, joint swelling, muscle cramps and muscle ache.   Skin: Negative for color change, pale and rash.   Allergic/Immunologic: Negative for seasonal allergies.   Neurological: Negative for dizziness, history of vertigo, light-headedness, passing out and headaches.   Hematologic/Lymphatic: Negative for swollen lymph nodes, easy bruising/bleeding and history of blood clots. Does not bruise/bleed easily.   Psychiatric/Behavioral: Negative for nervous/anxious, sleep disturbance and depression. The patient is not nervous/anxious.        Objective:      Physical Exam   Constitutional: He is oriented to person, place, and time. He appears well-developed and well-nourished. He is cooperative.  Non-toxic appearance. He " does not appear ill. No distress.   HENT:   Head: Normocephalic and atraumatic.   Right Ear: Hearing, external ear and ear canal normal. A middle ear effusion is present.   Left Ear: Hearing, external ear and ear canal normal. A middle ear effusion is present.   Nose: Nose normal. No mucosal edema, rhinorrhea or nasal deformity. No epistaxis. Right sinus exhibits no maxillary sinus tenderness and no frontal sinus tenderness. Left sinus exhibits no maxillary sinus tenderness and no frontal sinus tenderness.   Mouth/Throat: Uvula is midline, oropharynx is clear and moist and mucous membranes are normal. No trismus in the jaw. Normal dentition. No uvula swelling. No posterior oropharyngeal erythema.   Eyes: Conjunctivae and lids are normal. Right eye exhibits no discharge. Left eye exhibits no discharge. No scleral icterus.   Sclera clear bilat   Neck: Trachea normal, normal range of motion, full passive range of motion without pain and phonation normal. Neck supple.   Cardiovascular: Normal rate, regular rhythm, normal heart sounds, intact distal pulses and normal pulses.   Pulmonary/Chest: Effort normal and breath sounds normal. No respiratory distress.   Abdominal: Soft. Normal appearance and bowel sounds are normal. He exhibits no distension, no pulsatile midline mass and no mass. There is no tenderness.   Musculoskeletal: Normal range of motion. He exhibits no edema or deformity.   Neurological: He is alert and oriented to person, place, and time. He exhibits normal muscle tone. Coordination normal.   Skin: Skin is warm, dry and intact. He is not diaphoretic. No pallor.   Psychiatric: He has a normal mood and affect. His speech is normal and behavior is normal. Judgment and thought content normal. Cognition and memory are normal. He expresses no homicidal and no suicidal ideation. He expresses no suicidal plans and no homicidal plans.   Nursing note and vitals reviewed.      Assessment:       1. Otitis media with  effusion, right    2. Medication refill        Plan:         Otitis media with effusion, right  -     sodium chloride (SALINE NASAL) 0.65 % nasal spray; 1 spray by Nasal route as needed for Congestion.  Dispense: 50 mL; Refill: 0  -     oxymetazoline (AFRIN) 0.05 % nasal spray; 2 sprays by Nasal route 2 (two) times daily. for 3 days  Dispense: 15 mL; Refill: 0  -     betamethasone acetate-betamethasone sodium phosphate injection 6 mg    Medication refill  -     buPROPion (WELLBUTRIN XL) 300 MG 24 hr tablet; Take 1 tablet by mouth every morning  Dispense: 30 tablet; Refill: 0  -     escitalopram oxalate (LEXAPRO) 20 MG tablet; Take 2 tablets (40 mg total) by mouth once daily.  Dispense: 180 tablet; Refill: 0          Earache, No Infection (Adult)  Earaches can happen without an infection. This occurs when air and fluid build up behind the eardrum causing a feeling of fullness and discomfort and reduced hearing. This is called otitis media with effusion (OME) or serous otitis media. It means there is fluid in the middle ear. It is not the same as acute otitis media, which is typically from infection.  OME can happen when you have a cold if congestion blocks the passage that drains the middle ear. This passage is called the eustachian tube. OME may also occur with nasal allergies or after a bacterial middle ear infection.    The pain or discomfort may come and go. You may hear clicking or popping sounds when you chew or swallow. You may feel that your balance is off. Or you may hear ringing in the ear.  It often takes from several weeks up to 3 months for the fluid to clear on its own. Oral pain relievers and ear drops help if there is pain. Decongestants and antihistamines sometimes help. Antibiotics don't help since there is no infection. Your doctor may prescribe a nasal spray to help reduce swelling in the nose and eustachian tube. This can allow the ear to drain.  If your OME doesn't improve after 3 months,  surgery may be used to drain the fluid and insert a small tube in the eardrum to allow continued drainage.  Because the middle ear fluid can become infected, it is important to watch for signs of an ear infection which may develop later. These signs include increased ear pain, fever, or drainage from the ear.  Home care  The following guidelines will help you care for yourself at home:  · You may use over-the-counter medicine as directed to control pain, unless another medicine was prescribed. If you have chronic liver or kidney disease or ever had a stomach ulcer or GI bleeding, talk with your doctor before using these medicines. Aspirin should never be used in anyone under 18 years of age who is ill with a fever. It may cause severe liver damage.  · You may use over-the-counter decongestants such as phenylephrine or pseudoephedrine. But they are not always helpful. Don't use nasal spray decongestants more than 3 days. Longer use can make congestion worse. Prescription nasal sprays from your doctor don't typically have those restrictions.  · Antihistamines may help if you are also having allergy symptoms.  · You may use medicines such as guaifenesin to thin mucus and promote drainage.  Follow-up care  Follow up with your healthcare provider or as advised if you are not feeling better after 3 days.  When to seek medical advice  Call your healthcare provider right away if any of the following occur:  · Your ear pain gets worse or does not start to improve   · Fever of 100.4°F (38°C) or higher, or as directed by your healthcare provider  · Fluid or blood draining from the ear  · Headache or sinus pain  · Stiff neck  · Unusual drowsiness or confusion  Date Last Reviewed: 10/1/2016  © 3410-1793 [employer]. [address] [place] [address], [place] [address]. All rights reserved. This information is not intended as a substitute for professional medical care. Always follow your healthcare professional's  instructions.        Depression: Tips to Help Yourself  As your healthcare providers help treat your depression, you can also help yourself. Keep in mind that your illness affects you emotionally, physically, mentally, and socially. So full recovery will take time. Take care of your body and your soul, and be patient with yourself as you get better.    Self-care  · Educate yourself. Read about treatment and medicine options. If you have the energy, attend local conferences or support groups. Keep a list of useful websites and helpful books and use them as needed. This illness is not your fault. Dont blame yourself for your depression.  · Manage early symptoms. If you notice symptoms returning, experience triggers, or identify other factors that may lead to a depressive episode, get help as soon as possible. Ask trusted friends and family to monitor your behavior and let you know if they see anything of concern.  · Work with your provider. Find a provider you can trust. Communicate honestly with that person and share information on your treatment for depression and your reaction to medicines.  · Be prepared for a crisis. Know what to do if you experience a crisis. Keep the phone number of a crisis hotline and know the location of your community's urgent care centers and the closest emergency department.  · Hold off on big decisions. Depression can cloud your judgment. So wait until you feel better before making major life decisions, such as changing jobs, moving, or getting  or .  · Be patient. Recovering from depression is a process. Dont be discouraged if it takes some time to feel better.  · Keep it simple. Depression saps your energy and concentration. So you wont be able to do all the things you used to do. Set small goals and do what you can.  · Be with others. Dont isolate yourself--youll only feel worse. Try to be with other people. And take part in fun activities when you can. Go to a  movie, ballgame, [employer]oro[employer]rianism service, or social event. Talk openly with people you can trust. And accept help when its offered.  Take care of your body  People with depression often lose the desire to take care of themselves. That only makes their problems worse. During treatment and afterward, make a point to:  · Exercise. Its a great way to take care of your body. And studies have shown that exercise helps fight depression.  · Avoid drugs and alcohol. These may ease the pain in the short term. But theyll only make your problems worse in the long run.  · Get relief from stress. Ask your healthcare provider for relaxation exercises and techniques to help relieve stress.  · Eat right. A balanced and healthy diet helps keep your body healthy.  Date Last Reviewed: 1/1/2017 © 2000-2017 [employer]. [address] [place] [address], [place] [address]. All rights reserved. This information is not intended as a substitute for professional medical care. Always follow your healthcare professional's instructions.    Please follow up with your Primary care provider within 2-5 days if your signs and symptoms have not resolved or worsen.     If your condition worsens or fails to improve we recommend that you receive another evaluation at the emergency room immediately or contact your primary medical clinic to discuss your concerns.   You must understand that you have received an Urgent Care treatment only and that you may be released before all of your medical problems are known or treated. You, the patient, will arrange for follow up care as instructed.     RED FLAGS/WARNING SYMPTOMS DISCUSSED WITH PATIENT THAT WOULD WARRANT EMERGENT MEDICAL ATTENTION. PATIENT VERBALIZED UNDERSTANDING.

## 2019-10-05 ENCOUNTER — PATIENT OUTREACH (OUTPATIENT)
Dept: ADMINISTRATIVE | Facility: OTHER | Age: 39
End: 2019-10-05

## 2019-10-07 ENCOUNTER — OFFICE VISIT (OUTPATIENT)
Dept: ORTHOPEDICS | Facility: CLINIC | Age: 39
End: 2019-10-07
Payer: COMMERCIAL

## 2019-10-07 ENCOUNTER — HOSPITAL ENCOUNTER (OUTPATIENT)
Dept: RADIOLOGY | Facility: OTHER | Age: 39
Discharge: HOME | End: 2019-10-07
Attending: ORTHOPAEDIC SURGERY
Payer: COMMERCIAL

## 2019-10-07 VITALS — WEIGHT: 207 LBS | HEIGHT: 70 IN | BODY MASS INDEX: 29.63 KG/M2

## 2019-10-07 DIAGNOSIS — G56.03 BILATERAL CARPAL TUNNEL SYNDROME: ICD-10-CM

## 2019-10-07 DIAGNOSIS — M79.642 BILATERAL HAND PAIN: ICD-10-CM

## 2019-10-07 DIAGNOSIS — M79.641 BILATERAL HAND PAIN: ICD-10-CM

## 2019-10-07 PROCEDURE — 3008F PR BODY MASS INDEX (BMI) DOCUMENTED: ICD-10-PCS | Mod: CPTII,S$GLB,, | Performed by: ORTHOPAEDIC SURGERY

## 2019-10-07 PROCEDURE — 73130 XR HAND COMPLETE 3 VIEWS BILATERAL: ICD-10-PCS | Mod: 26,50,, | Performed by: RADIOLOGY

## 2019-10-07 PROCEDURE — 73130 X-RAY EXAM OF HAND: CPT | Mod: 50,TC,FY

## 2019-10-07 PROCEDURE — 99999 PR PBB SHADOW E&M-EST. PATIENT-LVL III: CPT | Mod: PBBFAC,,, | Performed by: ORTHOPAEDIC SURGERY

## 2019-10-07 PROCEDURE — 99999 PR PBB SHADOW E&M-EST. PATIENT-LVL III: ICD-10-PCS | Mod: PBBFAC,,, | Performed by: ORTHOPAEDIC SURGERY

## 2019-10-07 PROCEDURE — 99204 OFFICE O/P NEW MOD 45 MIN: CPT | Mod: S$GLB,,, | Performed by: ORTHOPAEDIC SURGERY

## 2019-10-07 PROCEDURE — 3008F BODY MASS INDEX DOCD: CPT | Mod: CPTII,S$GLB,, | Performed by: ORTHOPAEDIC SURGERY

## 2019-10-07 PROCEDURE — 73130 X-RAY EXAM OF HAND: CPT | Mod: 26,50,, | Performed by: RADIOLOGY

## 2019-10-07 PROCEDURE — 99204 PR OFFICE/OUTPT VISIT, NEW, LEVL IV, 45-59 MIN: ICD-10-PCS | Mod: S$GLB,,, | Performed by: ORTHOPAEDIC SURGERY

## 2019-10-07 NOTE — LETTER
October 7, 2019      Chris Chao MD  [address]  [facility] [address]           [facility] [place] [facility]  [address], [address]  [facility] [address]  Phone: [phone]          Patient: Wilbur Diop   MR Number: [id number]   YOB: 1980   Date of Visit: 10/7/2019       Dear Dr. Chris Chao:    Thank you for referring Wilbur Diop to me for evaluation. Attached you will find relevant portions of my assessment and plan of care.    If you have questions, please do not hesitate to call me. I look forward to following Wilbur Diop along with you.    Sincerely,    Robert Hinson MD    Enclosure  CC:  No Recipients    If you would like to receive this communication electronically, please contact [email or web address] or [phone] to request more information on [employer] Link access.    For providers and/or their staff who would like to refer a patient to [facility], please contact us through our one-stop-shop provider referral line, [facility], at [phone].    If you feel you have received this communication in error or would no longer like to receive these types of communications, please e-mail [email or web address]

## 2019-10-07 NOTE — PROGRESS NOTES
[facility]  History & Physical  Orthopedics    SUBJECTIVE:      Chief Complaint:  Bilateral hand numbness    Referring Provider: Chris Chao MD     History of Present Illness:  Patient is a 39 y.o. right hand dominant male who presents today with complaints of bilateral hand numbness.  Patient reports 6-7 months of bilateral hand numbness.  Reports that began as numbness and paresthesias at night.  Has progressed such that he has constant paresthesias to the radial 3 digits as well as occasional numbness.  Reports symptoms are worse with activity as well as worsewith the rest.  Patient also reports decreased strength during this time. Reports that symptoms are bilateral, right greater than left    The patient is a/an .    Onset of symptoms/DOI was 6-7 months ago.    Symptoms are aggravated by activity and at night.    Symptoms are alleviated by rest.    Symptoms consist of Numbness and paresthesias, decreased strength.    The patient rates their pain as a 0/10.    Attempted treatment(s) and/or interventions include rest.     The patient denies any fevers, chills, N/V, D/C and presents for evaluation.       Past Medical History:   Diagnosis Date    ADHD (attention deficit hyperactivity disorder)     Allergy     Anxiety     Depression      Past Surgical History:   Procedure Laterality Date    APPENDECTOMY      HERNIA REPAIR       Review of patient's allergies indicates:  No Known Allergies  Social History     Social History Narrative    Not on file     Family History   Problem Relation Age of Onset    Depression Mother     No Known Problems Father     No Known Problems Sister     No Known Problems Brother     No Known Problems Maternal Aunt     No Known Problems Maternal Uncle     No Known Problems Paternal Aunt     No Known Problems Paternal Uncle     No Known Problems Maternal Grandmother     No Known Problems Maternal Grandfather     No Known Problems  "Paternal Grandmother     No Known Problems Paternal Grandfather     Amblyopia Neg Hx     Blindness Neg Hx     Cancer Neg Hx     Cataracts Neg Hx     Diabetes Neg Hx     Glaucoma Neg Hx     Hypertension Neg Hx     Macular degeneration Neg Hx     Retinal detachment Neg Hx     Strabismus Neg Hx     Stroke Neg Hx     Thyroid disease Neg Hx          Current Outpatient Medications:     buPROPion (WELLBUTRIN XL) 300 MG 24 hr tablet, Take 1 tablet by mouth every morning, Disp: 30 tablet, Rfl: 0    cetirizine (ZYRTEC) 5 MG tablet, Take 5 mg by mouth once daily., Disp: , Rfl:     dextroamphetamine-amphetamine (ADDERALL XR) 10 MG 24 hr capsule, Take 1 capsule by mouth every morning, Disp: 30 capsule, Rfl: 0    escitalopram oxalate (LEXAPRO) 20 MG tablet, Take 2 tablets (40 mg total) by mouth once daily., Disp: 180 tablet, Rfl: 0    ketoconazole (NIZORAL) 2 % cream, Apply topically 2 (two) times daily., Disp: 60 g, Rfl: 1    sodium chloride (SALINE NASAL) 0.65 % nasal spray, 1 spray by Nasal route as needed for Congestion., Disp: 50 mL, Rfl: 0    dextroamphetamine-amphetamine (ADDERALL XR) 10 MG 24 hr capsule, Take 1 capsule (10 mg total) by mouth every morning. (Patient not taking: Reported on 10/7/2019), Disp: 30 capsule, Rfl: 0    dextroamphetamine-amphetamine (ADDERALL XR) 10 MG 24 hr capsule, Take 1 capsule (10 mg total) by mouth every morning. (Patient not taking: Reported on 10/7/2019), Disp: 30 capsule, Rfl: 0      Review of Systems:  Constitutional: no fever or chills  Eyes: no visual changes  ENT: no nasal congestion or sore throat  Respiratory: no cough or shortness of breath  Cardiovascular: no chest pain  Gastrointestinal: no nausea or vomiting, tolerating diet  Musculoskeletal: numbness/tingling    OBJECTIVE:      Vital Signs (Most Recent):  Vitals:    10/07/19 1443   Weight: 93.9 kg (207 lb 0.2 oz)   Height: 5' 10" (1.778 m)     Body mass index is 29.7 kg/m².      Physical " Exam:  Constitutional: The patient appears well-developed and well-nourished. No distress.   Head: Normocephalic and atraumatic.   Nose: Nose normal.   Eyes: Conjunctivae and EOM are normal.   Neck: No tracheal deviation present.   Cardiovascular: Normal rate and intact distal pulses.    Pulmonary/Chest: Effort normal. No respiratory distress.   Abdominal: There is no guarding.   Neurological: The patient is alert.   Psychiatric: The patient has a normal mood and affect.     Bilateral Hand/Wrist Examination:    Observation/Inspection:  Swelling  none    Deformity  none  Discoloration  none     Scars   none    Atrophy  none    HAND/WRIST EXAMINATION:  Finkelstein's Test   Neg  WHAT Test    Neg  Snuff box tenderness   Neg  Aguilar's Test    Neg  Hook of Hamate Tenderness  Neg  CMC grind    Neg  Circumduction test   Neg    Neurovascular Exam:  Digits WWP, brisk CR < 3s throughout  NVI motor/LTS to M/R/U nerves, radial pulse 2+  Tinel's Test - Carpal Tunnel  Pos  Tinel's Test - Cubital Tunnel  Mildly  Phalen's Test    pos  Median Nerve Compression Test pos    ROM hand/wrist/elbow full, painless        Diagnostic Results:     Xray -  three views bilateral wrists demonstrate no fracture dislocation or significant arthritis  EMG - has not been completed    ASSESSMENT/PLAN:      39 y.o. yo male with with bilateral hand numbness and paresthesias consistent with carpal tunnel syndrome  Plan:  We will begin conservative treatment including bilateral wrist braces in NSAIDs.  Will obtain EMG bilateral upper extremities.  Return to clinic after EMG         Robert Hinson M.D.     Please be aware that this note has been generated with the assistance of [employer] voice-to-text.  Please excuse any spelling or grammatical errors.

## 2019-11-04 ENCOUNTER — PATIENT OUTREACH (OUTPATIENT)
Dept: ADMINISTRATIVE | Facility: OTHER | Age: 39
End: 2019-11-04

## 2019-11-12 ENCOUNTER — HOSPITAL ENCOUNTER (EMERGENCY)
Facility: OTHER | Age: 39
Discharge: PSYCHIATRIC OR SUBSTANCE USE FACILITY | End: 2019-11-12
Attending: EMERGENCY MEDICINE
Payer: COMMERCIAL

## 2019-11-12 VITALS
OXYGEN SATURATION: 98 % | TEMPERATURE: 99 F | HEIGHT: 71 IN | HEART RATE: 62 BPM | RESPIRATION RATE: 18 BRPM | BODY MASS INDEX: 28.7 KG/M2 | SYSTOLIC BLOOD PRESSURE: 140 MMHG | DIASTOLIC BLOOD PRESSURE: 95 MMHG | WEIGHT: 205 LBS

## 2019-11-12 DIAGNOSIS — F32.A DEPRESSION WITH SUICIDAL IDEATION: Primary | ICD-10-CM

## 2019-11-12 DIAGNOSIS — R45.851 DEPRESSION WITH SUICIDAL IDEATION: Primary | ICD-10-CM

## 2019-11-12 LAB
ALBUMIN SERPL BCP-MCNC: 4.7 G/DL (ref 3.5–5.2)
ALP SERPL-CCNC: 62 U/L (ref 55–135)
ALT SERPL W/O P-5'-P-CCNC: 37 U/L (ref 10–44)
AMPHET+METHAMPHET UR QL: NEGATIVE
ANION GAP SERPL CALC-SCNC: 13 MMOL/L (ref 8–16)
APAP SERPL-MCNC: <3 UG/ML (ref 10–20)
AST SERPL-CCNC: 24 U/L (ref 10–40)
BARBITURATES UR QL SCN>200 NG/ML: NEGATIVE
BASOPHILS # BLD AUTO: 0.06 K/UL (ref 0–0.2)
BASOPHILS NFR BLD: 0.6 % (ref 0–1.9)
BENZODIAZ UR QL SCN>200 NG/ML: NEGATIVE
BILIRUB SERPL-MCNC: 0.7 MG/DL (ref 0.1–1)
BILIRUB UR QL STRIP: NEGATIVE
BUN SERPL-MCNC: 18 MG/DL (ref 6–20)
BZE UR QL SCN: NEGATIVE
CALCIUM SERPL-MCNC: 10.4 MG/DL (ref 8.7–10.5)
CANNABINOIDS UR QL SCN: NEGATIVE
CHLORIDE SERPL-SCNC: 101 MMOL/L (ref 95–110)
CLARITY UR: CLEAR
CO2 SERPL-SCNC: 26 MMOL/L (ref 23–29)
COLOR UR: YELLOW
CREAT SERPL-MCNC: 0.9 MG/DL (ref 0.5–1.4)
CREAT UR-MCNC: 114 MG/DL (ref 23–375)
DIFFERENTIAL METHOD: NORMAL
EOSINOPHIL # BLD AUTO: 0.3 K/UL (ref 0–0.5)
EOSINOPHIL NFR BLD: 3.4 % (ref 0–8)
ERYTHROCYTE [DISTWIDTH] IN BLOOD BY AUTOMATED COUNT: 13 % (ref 11.5–14.5)
EST. GFR  (AFRICAN AMERICAN): >60 ML/MIN/1.73 M^2
EST. GFR  (NON AFRICAN AMERICAN): >60 ML/MIN/1.73 M^2
ETHANOL SERPL-MCNC: <10 MG/DL
GLUCOSE SERPL-MCNC: 92 MG/DL (ref 70–110)
GLUCOSE UR QL STRIP: NEGATIVE
HCT VFR BLD AUTO: 48.1 % (ref 40–54)
HGB BLD-MCNC: 16.1 G/DL (ref 14–18)
HGB UR QL STRIP: NEGATIVE
IMM GRANULOCYTES # BLD AUTO: 0.04 K/UL (ref 0–0.04)
IMM GRANULOCYTES NFR BLD AUTO: 0.4 % (ref 0–0.5)
KETONES UR QL STRIP: NEGATIVE
LEUKOCYTE ESTERASE UR QL STRIP: NEGATIVE
LYMPHOCYTES # BLD AUTO: 2.8 K/UL (ref 1–4.8)
LYMPHOCYTES NFR BLD: 28.5 % (ref 18–48)
MCH RBC QN AUTO: 29.4 PG (ref 27–31)
MCHC RBC AUTO-ENTMCNC: 33.5 G/DL (ref 32–36)
MCV RBC AUTO: 88 FL (ref 82–98)
METHADONE UR QL SCN>300 NG/ML: NEGATIVE
MONOCYTES # BLD AUTO: 0.9 K/UL (ref 0.3–1)
MONOCYTES NFR BLD: 8.8 % (ref 4–15)
NEUTROPHILS # BLD AUTO: 5.8 K/UL (ref 1.8–7.7)
NEUTROPHILS NFR BLD: 58.3 % (ref 38–73)
NITRITE UR QL STRIP: NEGATIVE
NRBC BLD-RTO: 0 /100 WBC
OPIATES UR QL SCN: NEGATIVE
PCP UR QL SCN>25 NG/ML: NEGATIVE
PH UR STRIP: 7 [PH] (ref 5–8)
PLATELET # BLD AUTO: 330 K/UL (ref 150–350)
PMV BLD AUTO: 10.3 FL (ref 9.2–12.9)
POTASSIUM SERPL-SCNC: 4.5 MMOL/L (ref 3.5–5.1)
PROT SERPL-MCNC: 8.4 G/DL (ref 6–8.4)
PROT UR QL STRIP: NEGATIVE
RBC # BLD AUTO: 5.47 M/UL (ref 4.6–6.2)
SODIUM SERPL-SCNC: 140 MMOL/L (ref 136–145)
SP GR UR STRIP: 1.02 (ref 1–1.03)
TOXICOLOGY INFORMATION: NORMAL
TSH SERPL DL<=0.005 MIU/L-ACNC: 0.92 UIU/ML (ref 0.4–4)
URN SPEC COLLECT METH UR: NORMAL
UROBILINOGEN UR STRIP-ACNC: NEGATIVE EU/DL
WBC # BLD AUTO: 9.92 K/UL (ref 3.9–12.7)

## 2019-11-12 PROCEDURE — 80053 COMPREHEN METABOLIC PANEL: CPT

## 2019-11-12 PROCEDURE — 80329 ANALGESICS NON-OPIOID 1 OR 2: CPT

## 2019-11-12 PROCEDURE — 80320 DRUG SCREEN QUANTALCOHOLS: CPT

## 2019-11-12 PROCEDURE — 81003 URINALYSIS AUTO W/O SCOPE: CPT | Mod: 59

## 2019-11-12 PROCEDURE — 99285 EMERGENCY DEPT VISIT HI MDM: CPT

## 2019-11-12 PROCEDURE — 80307 DRUG TEST PRSMV CHEM ANLYZR: CPT

## 2019-11-12 PROCEDURE — 84443 ASSAY THYROID STIM HORMONE: CPT

## 2019-11-12 PROCEDURE — 85025 COMPLETE CBC W/AUTO DIFF WBC: CPT

## 2019-11-12 NOTE — ED TRIAGE NOTES
Pt presents to ed c/o feeling depressed for the last 4-6 months. Pt states that he has minimal support at home and that his wife can only do so much by being there for him because she has to take care of the kids and herself. The patient states dealing with depression his whole life and being placed on 5mg Adderal, 40 mg Lexapro, and 300 mg Wellbutrin. He states that the meds have him feeling up and down at many points. He also states having thoughts of suicide but not having an actual plan. Pt AAOx4, answers questions appropriately, NAD noted, resp pattern even and non labored.

## 2019-11-12 NOTE — ED NOTES
Pt AAOx4, wife at bedside. Pt resting in ed stretcher 10, denies any pain, resp pattern even and non labored. Safety sitters Sahara, PCT and Ira PCT at pt bedside with direct observation. Will continue to monitor.

## 2019-11-12 NOTE — ED PROVIDER NOTES
"Encounter Date: 11/12/2019       History     Chief Complaint   Patient presents with    Suicidal     states, "I've been very depressed for a while, been dealing with it day to day."  Reports family issues.  Denies any hallucinations.  Reports thoughts of commiting suicide, but denies any plan to kill himself.  "off of one of his meds"     39-year-old male with history of anxiety and depression as well as allergies NAD presents to the emergency department with complaints of being depressed and suicidal.  Reports that he has been feeling this way for an extended period time.  States that he has been seeing counselors and psychiatrists his whole life.  He reports a family history of depression on his mother side of the family.  He states that he was prescribed Adderall, Wellbutrin and Lexapro by his former psychiatrist.  He states that the Lexapro and Wellbutrin doses have been increased over the last 2 years however he reports that he still was not feeling great.  He admits that he stopped seeing her within the last 6-9 months and his symptoms have progressively deteriorated.  He states that he has episodes of over eating as well as anorexia, wanting to stay in bed all day and thoughts of suicide daily.  He denies a plan for suicide denies access to guns.  He does admit to smoking marijuana last week but denies any significant history for drug use.  He denies alcohol use.  He states that he felt like this was his last resort at this point.  He admits that he was given information for a no other psychiatrist however they do not accept insurance and he could not afford to see the physician so he has been pushing back his appointment.  He states that he has been out of his Wellbutrin for 1 week and is still taking his Lexapro however will run out shortly.  He denies any previous inpatient admissions for this.  He denies hallucinations or delusions.  He reports that his symptoms have severely impacted his working family " life and caused financial strain for his family.    The history is provided by the patient.     Review of patient's allergies indicates:  No Known Allergies  Past Medical History:   Diagnosis Date    ADHD (attention deficit hyperactivity disorder)     Allergy     Anxiety     Depression      Past Surgical History:   Procedure Laterality Date    APPENDECTOMY      HERNIA REPAIR       Family History   Problem Relation Age of Onset    Depression Mother     No Known Problems Father     No Known Problems Sister     No Known Problems Brother     No Known Problems Maternal Aunt     No Known Problems Maternal Uncle     No Known Problems Paternal Aunt     No Known Problems Paternal Uncle     No Known Problems Maternal Grandmother     No Known Problems Maternal Grandfather     No Known Problems Paternal Grandmother     No Known Problems Paternal Grandfather     Amblyopia Neg Hx     Blindness Neg Hx     Cancer Neg Hx     Cataracts Neg Hx     Diabetes Neg Hx     Glaucoma Neg Hx     Hypertension Neg Hx     Macular degeneration Neg Hx     Retinal detachment Neg Hx     Strabismus Neg Hx     Stroke Neg Hx     Thyroid disease Neg Hx      Social History     Tobacco Use    Smoking status: Former Smoker     Last attempt to quit: 10/22/2004     Years since quitting: 15.0    Smokeless tobacco: Never Used   Substance Use Topics    Alcohol use: Yes     Alcohol/week: 1.7 standard drinks     Types: 2 Standard drinks or equivalent per week    Drug use: No     Review of Systems   Constitutional: Positive for activity change and appetite change.   Respiratory: Negative for shortness of breath.    Cardiovascular: Negative for chest pain.   Gastrointestinal: Negative for nausea and vomiting.   Genitourinary: Negative for difficulty urinating and dysuria.   Neurological: Negative for weakness.   Hematological: Does not bruise/bleed easily.   Psychiatric/Behavioral: Positive for sleep disturbance and suicidal ideas.  Negative for agitation, confusion, decreased concentration, dysphoric mood, hallucinations and self-injury. The patient is nervous/anxious.        Physical Exam     Initial Vitals [11/12/19 1443]   BP Pulse Resp Temp SpO2   (!) 143/96 63 14 98.6 °F (37 °C) 100 %      MAP       --         Physical Exam    Nursing note and vitals reviewed.  Constitutional: He appears well-developed and well-nourished. He is not diaphoretic.  Non-toxic appearance. No distress.   HENT:   Head: Normocephalic and atraumatic.   Right Ear: External ear normal.   Left Ear: External ear normal.   Nose: Nose normal.   Mouth/Throat: Oropharynx is clear and moist.   Eyes: Conjunctivae, EOM and lids are normal. Pupils are equal, round, and reactive to light. No scleral icterus.   Neck: Normal range of motion and phonation normal.   Cardiovascular: Normal rate, regular rhythm and normal heart sounds. Exam reveals no gallop and no friction rub.    No murmur heard.  Pulmonary/Chest: Breath sounds normal. No respiratory distress. He has no wheezes. He has no rhonchi. He has no rales.   Musculoskeletal: Normal range of motion.   Neurological: He is alert and oriented to person, place, and time. GCS eye subscore is 4. GCS verbal subscore is 5. GCS motor subscore is 6.   Skin: Skin is warm, dry and intact. No rash noted.   Psychiatric: His speech is normal and behavior is normal. Judgment normal. His affect is not inappropriate. He is not actively hallucinating. Thought content is not paranoid and not delusional. Cognition and memory are normal. He does not express impulsivity or inappropriate judgment. He exhibits a depressed mood. He expresses suicidal ideation. He expresses no homicidal ideation. He expresses no suicidal plans and no homicidal plans. He is attentive.         ED Course   Procedures  Labs Reviewed   ACETAMINOPHEN LEVEL - Abnormal; Notable for the following components:       Result Value    Acetaminophen (Tylenol), Serum <3.0 (*)      All other components within normal limits   CBC W/ AUTO DIFFERENTIAL   COMPREHENSIVE METABOLIC PANEL   TSH   URINALYSIS, REFLEX TO URINE CULTURE    Narrative:     Preferred Collection Type->Urine, Clean Catch   DRUG SCREEN PANEL, URINE EMERGENCY    Narrative:     Preferred Collection Type->Urine, Clean Catch   ALCOHOL,MEDICAL (ETHANOL)          Imaging Results    None          Medical Decision Making:   History:   Old Medical Records: I decided to obtain old medical records.  Initial Assessment:    39-year-old male with complaints consistent with depression with associated suicidal ideations.  Afebrile and neurovascularly intact.  Patient has a depressed mood reports suicidal ideations everyday for an extended period of time.  No plan and he is not homicidal.  No active hallucinations, delusions or paranoia.  I am concerned due to the patient feeling that he is unable to seek voluntary treatment secondary to financial issues and family matters at home.  Clinical Tests:   Lab Tests: Ordered and Reviewed  ED Management:  Patient was placed under psychiatric hold and a PEC was signed.  Orders were placed to medically clear the patient for placement.  4:43 PM patient is medically clear for psychiatric placement.  Discussed plan and placement with patient as well as his wife who state understanding.  Patient accepted at [facility].  Will transfer patient.  Resting comfortably with no other complaints or concerns at this time.  Remains under direct observation with sitter at bedside.  This is the extent of patient's complaints today.  This patient was discussed with the attending physician who agrees with treatment plan  Other:   I have discussed this case with another health care provider.       <> Summary of the Discussion: Jordy  This note was created using M[employer] dictation.  There may be typographical errors secondary to dictation.                                   Clinical Impression:      1. Depression with suicidal ideation            Disposition:   Disposition: Discharged  Condition: Stable                     Donna Summers PA-C  11/12/19 1441

## 2019-11-12 NOTE — ED NOTES
Pt resting comfortably in ed bed 10. All bathroom needs met at this time. Pt denies any pain at this time. Resp pattern even and non labored.  Saftey Risk Sitters NETO Adler  and NETO Roth at patient bedside for direct view and obeservation. Will continue to monitor.

## 2019-11-12 NOTE — ED NOTES
Belongings:  Black jacket,sneakers,socks,tshirt, cap,sweatpants    Valuables:  Keys   ID  Wallet

## 2019-11-12 NOTE — ED NOTES
Pt escorted to ED bed 10 by triage nurse. ED sitter Sahara at bedside for direct observation until further evaluation by provider. PEC precautions initiated until further evaluation. Will continue to monitor.

## 2019-11-13 NOTE — ED NOTES
Pt resting comfortably in ed stretcher 10. Pt AAOx4, denies any needs at this time, calm and cooperative, ED risk sitdre LI, PCT at bedside for direct observation. Will continue to monitor.

## 2019-11-13 NOTE — ED NOTES
Pt resting comfortably in ed stretcher 10. Pt AAOx4, denies any needs at this time, calm and cooperative, ED risk sitters NETO Shoemaker and NETO Roth at bedside for direct observation. Will continue to monitor.

## 2019-11-14 PROBLEM — F90.0 ATTENTION DEFICIT HYPERACTIVITY DISORDER (ADHD), PREDOMINANTLY INATTENTIVE TYPE: Status: RESOLVED | Noted: 2019-01-30 | Resolved: 2019-11-14

## 2019-11-14 PROBLEM — F32.0 CURRENT MILD EPISODE OF MAJOR DEPRESSIVE DISORDER WITHOUT PRIOR EPISODE: Status: RESOLVED | Noted: 2019-01-30 | Resolved: 2019-11-14

## 2019-11-26 RX ORDER — ESCITALOPRAM OXALATE 20 MG/1
20 TABLET ORAL DAILY
Qty: 14 TABLET | Refills: 0 | Status: CANCELLED | OUTPATIENT
Start: 2019-11-26

## 2020-02-06 ENCOUNTER — OFFICE VISIT (OUTPATIENT)
Dept: PSYCHIATRY | Facility: CLINIC | Age: 40
End: 2020-02-06
Payer: COMMERCIAL

## 2020-02-06 DIAGNOSIS — Z03.89 NO DIAGNOSIS ON AXIS I: Primary | ICD-10-CM

## 2020-02-06 PROCEDURE — 90791 PR PSYCHIATRIC DIAGNOSTIC EVALUATION: ICD-10-PCS | Mod: S$GLB,,, | Performed by: SOCIAL WORKER

## 2020-02-06 PROCEDURE — 99999 PR PBB SHADOW E&M-EST. PATIENT-LVL II: ICD-10-PCS | Mod: PBBFAC,,, | Performed by: SOCIAL WORKER

## 2020-02-06 PROCEDURE — 99999 PR PBB SHADOW E&M-EST. PATIENT-LVL II: CPT | Mod: PBBFAC,,, | Performed by: SOCIAL WORKER

## 2020-02-06 PROCEDURE — 90791 PSYCH DIAGNOSTIC EVALUATION: CPT | Mod: S$GLB,,, | Performed by: SOCIAL WORKER

## 2020-03-19 ENCOUNTER — TELEPHONE (OUTPATIENT)
Dept: INTERNAL MEDICINE | Facility: CLINIC | Age: 40
End: 2020-03-19

## 2020-03-19 ENCOUNTER — OFFICE VISIT (OUTPATIENT)
Dept: INTERNAL MEDICINE | Facility: CLINIC | Age: 40
End: 2020-03-19
Payer: COMMERCIAL

## 2020-03-19 VITALS
SYSTOLIC BLOOD PRESSURE: 120 MMHG | WEIGHT: 225.06 LBS | DIASTOLIC BLOOD PRESSURE: 84 MMHG | HEIGHT: 71 IN | OXYGEN SATURATION: 98 % | HEART RATE: 74 BPM | BODY MASS INDEX: 31.51 KG/M2

## 2020-03-19 DIAGNOSIS — F32.1 CURRENT MODERATE EPISODE OF MAJOR DEPRESSIVE DISORDER WITHOUT PRIOR EPISODE: ICD-10-CM

## 2020-03-19 DIAGNOSIS — H91.91 HEARING LOSS OF RIGHT EAR, UNSPECIFIED HEARING LOSS TYPE: ICD-10-CM

## 2020-03-19 DIAGNOSIS — Z00.00 WELLNESS EXAMINATION: Primary | ICD-10-CM

## 2020-03-19 PROCEDURE — 99396 PR PREVENTIVE VISIT,EST,40-64: ICD-10-PCS | Mod: S$GLB,,, | Performed by: INTERNAL MEDICINE

## 2020-03-19 PROCEDURE — 99999 PR PBB SHADOW E&M-EST. PATIENT-LVL III: CPT | Mod: PBBFAC,,, | Performed by: INTERNAL MEDICINE

## 2020-03-19 PROCEDURE — 99999 PR PBB SHADOW E&M-EST. PATIENT-LVL III: ICD-10-PCS | Mod: PBBFAC,,, | Performed by: INTERNAL MEDICINE

## 2020-03-19 PROCEDURE — 99396 PREV VISIT EST AGE 40-64: CPT | Mod: S$GLB,,, | Performed by: INTERNAL MEDICINE

## 2020-03-19 RX ORDER — VENLAFAXINE HYDROCHLORIDE 75 MG/1
75 CAPSULE, EXTENDED RELEASE ORAL EVERY MORNING
Qty: 90 CAPSULE | Refills: 0 | Status: SHIPPED | OUTPATIENT
Start: 2020-03-19 | End: 2020-04-13 | Stop reason: SDUPTHER

## 2020-03-19 NOTE — PROGRESS NOTES
Subjective:       Patient ID: Wilbur Diop is a 40 y.o. male.    Chief Complaint: Depression    Pt here for annual exam. Counseled on exercise goals.    He check himself into the hospital for depression a few months ago. Saw psychiatry and had med adjustments and now on effexor and wellbutrin. However, he says the combo causes BP to go up some and also sweats. No cp/sob. He ran out of effexor 1 week ago and needs refill. He admits that the sweats occur when drinking 5hr energy which he does every day but is willing to start cutting out. No SI/HI. He is working on establishing with a new psychiatrist and has contacts. He is currently seeing therapist.     He saw ENT 4 years ago for R sided chronic hearing loss but never completed w/u. He would like to return as he continues to have issues with this and feels hearing loss is worse.     Depression Patient is not experiencing: palpitations and shortness of breath.      Review of Systems   Constitutional: Negative for appetite change, fatigue, fever and unexpected weight change.   HENT: Negative for congestion, rhinorrhea and sore throat.    Eyes: Negative for visual disturbance.   Respiratory: Negative for cough and shortness of breath.    Cardiovascular: Negative for chest pain, palpitations and leg swelling.   Gastrointestinal: Negative for abdominal pain, blood in stool, constipation and diarrhea.   Genitourinary: Negative for difficulty urinating and dysuria.   Skin: Negative for color change and rash.   Neurological: Negative for dizziness and syncope.   Hematological: Negative for adenopathy.   Psychiatric/Behavioral: Positive for depression. Negative for dysphoric mood.       Objective:      Physical Exam   Constitutional: He is oriented to person, place, and time. He appears well-developed and well-nourished.   HENT:   Head: Normocephalic and atraumatic.   Right Ear: External ear normal.   Left Ear: External ear normal.   Mouth/Throat: Oropharynx is clear and  moist. No oropharyngeal exudate.   Eyes: Pupils are equal, round, and reactive to light. Conjunctivae and EOM are normal.   Neck: Neck supple. Carotid bruit is not present. No thyromegaly present.   Cardiovascular: Normal rate, regular rhythm, S1 normal, S2 normal, normal heart sounds and intact distal pulses.   Pulmonary/Chest: Effort normal and breath sounds normal.   Abdominal: Soft. Bowel sounds are normal. He exhibits no mass. There is no hepatosplenomegaly. There is no tenderness.   Musculoskeletal: He exhibits no edema.   Lymphadenopathy:     He has no cervical adenopathy.   Neurological: He is alert and oriented to person, place, and time. No cranial nerve deficit.   Psychiatric: He has a normal mood and affect. His behavior is normal.       Assessment:       1. Wellness examination    2. Current moderate episode of major depressive disorder without prior episode    3. Hearing loss of right ear, unspecified hearing loss type        Plan:       1. Recent labs reviewed  2. Keep f/u with psychiatry  3. ENT referral  4. Refill effexor while awaiting psychiatry

## 2020-03-20 ENCOUNTER — TELEPHONE (OUTPATIENT)
Dept: INTERNAL MEDICINE | Facility: CLINIC | Age: 40
End: 2020-03-20

## 2020-04-13 RX ORDER — VENLAFAXINE HYDROCHLORIDE 75 MG/1
75 CAPSULE, EXTENDED RELEASE ORAL EVERY MORNING
Qty: 90 CAPSULE | Refills: 0 | Status: SHIPPED | OUTPATIENT
Start: 2020-04-13 | End: 2020-07-29 | Stop reason: SDUPTHER

## 2020-05-25 ENCOUNTER — OFFICE VISIT (OUTPATIENT)
Dept: PSYCHIATRY | Facility: CLINIC | Age: 40
End: 2020-05-25
Payer: COMMERCIAL

## 2020-05-25 DIAGNOSIS — F41.1 GAD (GENERALIZED ANXIETY DISORDER): Primary | ICD-10-CM

## 2020-05-25 DIAGNOSIS — F32.A DEPRESSION, UNSPECIFIED DEPRESSION TYPE: ICD-10-CM

## 2020-05-25 PROCEDURE — 99999 PR PBB SHADOW E&M-EST. PATIENT-LVL I: ICD-10-PCS | Mod: PBBFAC,,, | Performed by: SOCIAL WORKER

## 2020-05-25 PROCEDURE — 99999 PR PBB SHADOW E&M-EST. PATIENT-LVL I: CPT | Mod: PBBFAC,,, | Performed by: SOCIAL WORKER

## 2020-05-25 PROCEDURE — 90834 PSYTX W PT 45 MINUTES: CPT | Mod: S$GLB,,, | Performed by: SOCIAL WORKER

## 2020-05-25 PROCEDURE — 90834 PR PSYCHOTHERAPY W/PATIENT, 45 MIN: ICD-10-PCS | Mod: S$GLB,,, | Performed by: SOCIAL WORKER

## 2020-05-25 NOTE — PROGRESS NOTES
Individual Psychotherapy (PhD/LCSW)    5/25/2020    Site:  [facility]         Therapeutic Intervention: Met with patient.  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    Treatment plan:  · Target symptoms: depression, distractability, lack of focus, anxiety , mood swings, mood disorder, adjustment, grief, work stress  · Why chosen therapy is appropriate versus another modality: relevant to diagnosis, patient responds to this modality, evidence based practice  · Outcome monitoring methods: self-report, observation  · Therapeutic intervention type: insight oriented psychotherapy, behavior modifying psychotherapy, supportive psychotherapy    Risk parameters:  Patient reports no suicidal ideation  Patient reports no homicidal ideation  Patient reports no self-injurious behavior  Patient reports no violent behavior    Verbal deficits: None    Patient's response to intervention:  The patient's response to intervention is accepting, motivated.    Progress toward goals and other mental status changes:  The patient's progress toward goals is fair  .    Diagnosis:     ICD-10-CM ICD-9-CM   1. RACHEL (generalized anxiety disorder) F41.1 300.02   2. Depression, unspecified depression type F32.9 311       Plan:  individual psychotherapy, consult psychiatrist for medication evaluation and medication management by physician    Return to clinic: 2 weeks    Length of Service (minutes): 45    Taking really good care of himself highly emphasized.

## 2020-07-01 ENCOUNTER — OFFICE VISIT (OUTPATIENT)
Dept: PSYCHIATRY | Facility: CLINIC | Age: 40
End: 2020-07-01
Payer: COMMERCIAL

## 2020-07-01 DIAGNOSIS — F32.A DEPRESSION, UNSPECIFIED DEPRESSION TYPE: Primary | ICD-10-CM

## 2020-07-01 DIAGNOSIS — F98.8 ATTENTION DEFICIT DISORDER, UNSPECIFIED HYPERACTIVITY PRESENCE: ICD-10-CM

## 2020-07-01 PROCEDURE — 99213 PR OFFICE/OUTPT VISIT, EST, LEVL III, 20-29 MIN: ICD-10-PCS | Mod: 95,,, | Performed by: PSYCHIATRY & NEUROLOGY

## 2020-07-01 PROCEDURE — 99213 OFFICE O/P EST LOW 20 MIN: CPT | Mod: 95,,, | Performed by: PSYCHIATRY & NEUROLOGY

## 2020-07-01 RX ORDER — ESCITALOPRAM OXALATE 10 MG/1
10 TABLET ORAL DAILY
Qty: 30 TABLET | Refills: 5 | Status: SHIPPED | OUTPATIENT
Start: 2020-07-01 | End: 2020-07-29 | Stop reason: DRUGHIGH

## 2020-07-01 RX ORDER — DEXTROAMPHETAMINE SACCHARATE, AMPHETAMINE ASPARTATE MONOHYDRATE, DEXTROAMPHETAMINE SULFATE AND AMPHETAMINE SULFATE 2.5; 2.5; 2.5; 2.5 MG/1; MG/1; MG/1; MG/1
10 CAPSULE, EXTENDED RELEASE ORAL EVERY MORNING
Qty: 30 CAPSULE | Refills: 0 | Status: SHIPPED | OUTPATIENT
Start: 2020-07-01 | End: 2020-07-29 | Stop reason: SDUPTHER

## 2020-07-01 NOTE — PROGRESS NOTES
"Outpatient Psychiatry Initial Visit (MD/NP)    7/1/2020 The patient location is: [facility]  The chief complaint leading to consultation is: depression and ADD    Visit type: audiovisual    Face to Face time with patient: 34"  45 minutes of total time spent on the encounter, which includes face to face time and non-face to face time preparing to see the patient (eg, review of tests), Obtaining and/or reviewing separately obtained history, Documenting clinical information in the electronic or other health record, Independently interpreting results (not separately reported) and communicating results to the patient/family/caregiver, or Care coordination (not separately reported).         Each patient to whom he or she provides medical services by telemedicine is:  (1) informed of the relationship between the physician and patient and the respective role of any other health care provider with respect to management of the patient; and (2) notified that he or she may decline to receive medical services by telemedicine and may withdraw from such care at any time.    Notes: See below    Wilbur Diop, a 40 y.o. male, for initial evaluation visit.  Patient is referred by Chris Chao MD       Reason for Encounter: Referral for treatment    Complaints:  He has been experiencing depression and distractability. Patient seen for an initial evaluation today for his depression and ADD. Patient has had true ADD since he was in grammar school. He was treated at the time with Ritalin for about four years. He has also taken Adderall in the past for this problem. He has no current history of drug abuse and he is not abusing alcohol. Patient also is experiencing depression manifested by lack of interest and drive and a sense of davin. He has no thoughts of harming himself or others, is in good self control, and has no history of suicide attempts. He has no signs of ranjan or psychosis. He was hospitalized in the past in 2002 for two " days for depression and did well when treated on Lexapro. Patient is  and has two children. 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 He is very motivated to improve his mood and focus. At this time he remains easily scattered in his thinking and cannot complete tasks due to his lack of focus. This is confirmed by his wife at this time. Patient is bright intellectually despite this impediment.    Current Medications:  Medication List with Changes/Refills   New Medications    DEXTROAMPHETAMINE-AMPHETAMINE (ADDERALL XR) 10 MG 24 HR CAPSULE    Take 1 capsule (10 mg total) by mouth every morning.    ESCITALOPRAM OXALATE (LEXAPRO) 10 MG TABLET    Take 1 tablet (10 mg total) by mouth once daily.   Current Medications    BUPROPION (WELLBUTRIN XL) 150 MG TB24 TABLET    Take 1 tablet (150 mg total) by mouth once daily.    CETIRIZINE (ZYRTEC) 5 MG TABLET    Take 5 mg by mouth once daily.    VENLAFAXINE (EFFEXOR-XR) 75 MG 24 HR CAPSULE    Take 1 capsule (75 mg total) by mouth every morning.   Discontinued Medications    ESCITALOPRAM OXALATE (LEXAPRO) 20 MG TABLET    Take 2 tablets (40 mg total) by mouth once daily.        Stressors:  Lack of focus and depressed mood.    History:     Past Psychiatric History:   Previous therapy: yes  Previous psychiatric treatment and medication trials: yes  Previous psychiatric hospitalizations: yes  Previous diagnoses: yes  Previous suicide attempts: no  History of violence: no  Currently in treatment with myself and local counselor.  Education: high school diploma/GED  Other pertinent history: Arrests possession of marijuana in his 20's  Depression screening was performed with standardized tool: Not Screened - Not Eligible/Appropriate    Substance Abuse History:  Recreational drugs: no recreational drug use  Use of alcohol: occasional, social use  Use of caffeine: energy drink daily  Tobacco use: no  Legal consequences of chemical use: no  Patient feels he ought to  cut down on drinking and/or drug use: no  Patient has been annoyed by others criticizing his drinking or drug use: no  Patient has felt bad or guilty about drinking or drug use:no  Patient has had a drink or used drugs as an eye opener first thing in the morning to steady nerves, get rid of a hangover or get the day started: no  Use of OTC medications: Claritin, vitamins    The following portions of the patient's history were reviewed and updated as appropriate: allergies, current medications, past family history, past medical history, past social history, past surgical history and problem list.    Record Review: moderate      Review Of Systems:     Medical Review Of Systems:  ROS     Psychiatric Review Of Systems:  Sleep: no  Appetite changes: no  Weight changes: no  Energy: yes  Interest/pleasure/anhedonia: yes  Somatic symptoms: no  Libido: not questioned  Anxiety/panic: yes  Guilty/hopeless: no  Self-injurious behavior/risky behavior: no  Any drugs: no  Alcohol: no     Current Evaluation:       Mental Status Evaluation:  Appearance:  unremarkable, age appropriate, casually dressed, neatly groomed   Behavior:  normal, cooperative   Speech:  no latency; no press, spontaneous   Mood:  anxious, depressed   Affect:  congruent and appropriate   Thought Process:  normal and logical   Thought Content:  normal, no suicidality, no homicidality, delusions, or paranoia   Sensorium:  grossly intact   Cognition:  grossly intact   Insight:  has awareness of illness   Judgment:  behavior is adequate to circumstances     SIGECAPS  Sleep:   Interest:   Guilt:   Energy:   Concentration:   Appetite:   Psychomotor agitation:   Suicidal intentions: no  Suicidal plan: no    Physical/Somatic Complaints  The patient lists: no physical complaints.    Functioning in Relationships:  Spouse/partner:   Peers:   Employers:       Assessment - Diagnosis - Goals:     No diagnosis found.    Treatment Goals:  Specify outcomes written in  observable, behavioral terms:   Improve mood and stabilize focus with meds and supportive guiidance    Treatment Plan/Recommendations: Patient agrees to next visit in 2 weeks and to start Lexapro 10 mg q am, reduce Effexor XR to 150 mg q am.(goal is to taper off the Effexor which has not helped), and start Adderall XR 10 mg q am.  Follow-up plan for depression was discussed with patient.    Review with patient: Treatment plan reviewed with the patient.  Medication risks/benefit reviewed with the patient    Morgan Malin Jr

## 2020-07-22 ENCOUNTER — OFFICE VISIT (OUTPATIENT)
Dept: OTOLARYNGOLOGY | Facility: CLINIC | Age: 40
End: 2020-07-22
Payer: COMMERCIAL

## 2020-07-22 ENCOUNTER — CLINICAL SUPPORT (OUTPATIENT)
Dept: AUDIOLOGY | Facility: CLINIC | Age: 40
End: 2020-07-22
Payer: COMMERCIAL

## 2020-07-22 VITALS — BODY MASS INDEX: 19.8 KG/M2 | WEIGHT: 142 LBS

## 2020-07-22 DIAGNOSIS — H93.11 TINNITUS OF RIGHT EAR: ICD-10-CM

## 2020-07-22 DIAGNOSIS — H90.3 ASYMMETRIC SNHL (SENSORINEURAL HEARING LOSS): Primary | ICD-10-CM

## 2020-07-22 DIAGNOSIS — H90.3 SENSORINEURAL HEARING LOSS OF BOTH EARS: ICD-10-CM

## 2020-07-22 DIAGNOSIS — H90.3 ASYMMETRICAL SENSORINEURAL HEARING LOSS: Primary | ICD-10-CM

## 2020-07-22 PROCEDURE — 92567 PR TYMPA2METRY: ICD-10-PCS | Mod: S$GLB,,, | Performed by: AUDIOLOGIST

## 2020-07-22 PROCEDURE — 99999 PR PBB SHADOW E&M-EST. PATIENT-LVL I: ICD-10-PCS | Mod: PBBFAC,,, | Performed by: AUDIOLOGIST

## 2020-07-22 PROCEDURE — 3008F BODY MASS INDEX DOCD: CPT | Mod: CPTII,S$GLB,, | Performed by: OTOLARYNGOLOGY

## 2020-07-22 PROCEDURE — 99999 PR PBB SHADOW E&M-EST. PATIENT-LVL I: CPT | Mod: PBBFAC,,, | Performed by: AUDIOLOGIST

## 2020-07-22 PROCEDURE — 99999 PR PBB SHADOW E&M-EST. PATIENT-LVL III: ICD-10-PCS | Mod: PBBFAC,,, | Performed by: OTOLARYNGOLOGY

## 2020-07-22 PROCEDURE — 92557 COMPREHENSIVE HEARING TEST: CPT | Mod: S$GLB,,, | Performed by: AUDIOLOGIST

## 2020-07-22 PROCEDURE — 99204 OFFICE O/P NEW MOD 45 MIN: CPT | Mod: S$GLB,,, | Performed by: OTOLARYNGOLOGY

## 2020-07-22 PROCEDURE — 92567 TYMPANOMETRY: CPT | Mod: S$GLB,,, | Performed by: AUDIOLOGIST

## 2020-07-22 PROCEDURE — 99999 PR PBB SHADOW E&M-EST. PATIENT-LVL III: CPT | Mod: PBBFAC,,, | Performed by: OTOLARYNGOLOGY

## 2020-07-22 PROCEDURE — 99204 PR OFFICE/OUTPT VISIT, NEW, LEVL IV, 45-59 MIN: ICD-10-PCS | Mod: S$GLB,,, | Performed by: OTOLARYNGOLOGY

## 2020-07-22 PROCEDURE — 92557 PR COMPREHENSIVE HEARING TEST: ICD-10-PCS | Mod: S$GLB,,, | Performed by: AUDIOLOGIST

## 2020-07-22 PROCEDURE — 3008F PR BODY MASS INDEX (BMI) DOCUMENTED: ICD-10-PCS | Mod: CPTII,S$GLB,, | Performed by: OTOLARYNGOLOGY

## 2020-07-22 NOTE — PROGRESS NOTES
Otology/Neurotology History and Physical     CC: hearing loss     HPI:  Wilbur Diop is a 40 y.o. male who was referred to me by Dr. Chris Chao in consultation for worsening hearing loss. The patient has a history of asymmetric SNHL and was seen in 2016. MRI was recommended at that time but patient was unable to obtain due to insurance issues. Now, he thinks he can obtain MRI. He does report worsening hearing loss and worsening ability to understand conversation. He denies dizziness/vertigo. He does report right sided aural fullness and hissing tinnitus. He does not have a history of ear surgery. He reports a family history of hearing loss. Denies gun use but has been around loud noises in his life while doing yard work.      Past Medical History  He has a past medical history of ADHD (attention deficit hyperactivity disorder), Allergy, Anxiety, Depression, History of psychiatric hospitalization, psychiatric care, Psychiatric problem, and Therapy.    Past Surgical History  He has a past surgical history that includes Appendectomy and Hernia repair.    Family History  His family history includes Anxiety disorder in his mother; Cancer in his maternal grandfather; Depression in his mother; No Known Problems in his brother, father, maternal aunt, maternal grandmother, maternal uncle, paternal aunt, paternal grandfather, paternal grandmother, paternal uncle, and sister.    Social History  He reports that he quit smoking about 15 years ago. He has never used smokeless tobacco. He reports previous alcohol use of about 1.7 standard drinks of alcohol per week. He reports that he does not use drugs.    Allergies  He has No Known Allergies.    Medications  He has a current medication list which includes the following prescription(s): cetirizine, dextroamphetamine-amphetamine, escitalopram oxalate, venlafaxine, and bupropion.    Review of Systems       Objective:     Wt 64.4 kg (142 lb)   BMI 19.80 kg/m²    Physical  Exam  Constitutional: Well appearing/communicating. Voice clear. NAD.  Eyes: EOM I Bilaterally  Head/Face: Normocephalic.  House Brackmann I Bilaterally.  Right Ear: External Auditory Canal WNL,TM w/o masses/lesions/perforations.  Auricle WNL.  Left Ear: External Auditory Canal WNL,TM w/o masses/lesions/perforations. Auricle WNL.  Nose: No gross nasal septal deviation. Inferior Turbinates 2+ bilaterally. No septal perforation. No masses/lesions. External nasal skin without masses/lesions.  Oral Cavity: Gingiva/lips WNL. Oral Tongue mobile. Hard Palate WNL.   Oropharynx: Tonsillar fossa/pharyngeal wall without lesions. Posterior oropharynx WNL.  Soft palate without masses. Midline uvula.   Neck/Lymphatic: No LAD I-VI bilaterally.  No thyromegaly.  No masses noted on exam.  Neuro/Psychiatric: AOx3.  Normal mood and affect.   Respiratory: Normal respiratory effort, no stridor/stertor, no retractions noted.      Procedure    None        Data Reviewed            Slightly worsened (10 dB) asymmetric SNHL R>L.          Assessment:     1. Asymmetric SNHL (sensorineural hearing loss)    2. Tinnitus of right ear         Plan:   MRI IAC   RTC for results     Patient to see Audiology for hearing aid evaluation.

## 2020-07-22 NOTE — PROGRESS NOTES
Wilbur Diop was seen in the clinic today for an audiological evaluation.  Mr. Diop reported that he has been experiencing hearing loss of the right ear since 2013.    Audiological testing revealed a moderate to moderately-severe sensorineural hearing loss for the right ear and a mild to moderate mid to high frequency sensorineural hearing loss for the left ear.  A speech reception threshold was obtained at 60 dBHL for the right ear and at 25 dBHL for the left ear.  Speech discrimination was 88% for the right ear and 100% for the left ear.      Tympanometry testing revealed a Type A tympanogram for the right ear and a Type A tympanogram for the left ear.      Recommendations:  1. Otologic evaluation  2. Annual audiological evaluation  3. Hearing protection when in noise   4. Hearing aid consultation following medical clearance            
No

## 2020-07-22 NOTE — LETTER
July 22, 2020      Chris Chao MD  [address]  [facility] [address]           [facility] - Otorhinolaryngology  [address]  [facility] [address]  Phone: [phone]  Fax: [phone]          Patient: Wilbur Diop   MR Number: [id number]   YOB: 1980   Date of Visit: 7/22/2020       Dear Dr. Chris Chao:    Thank you for referring Wilbur Diop to me for evaluation. Attached you will find relevant portions of my assessment and plan of care.    If you have questions, please do not hesitate to call me. I look forward to following Wilbur Diop along with you.    Sincerely,    Theodore Johns MD    Enclosure  CC:  No Recipients    If you would like to receive this communication electronically, please contact [email or web address] or [phone] to request more information on [employer] access.    For providers and/or their staff who would like to refer a patient to [facility], please contact us through our one-stop-shop provider referral line, [facility], at [phone].    If you feel you have received this communication in error or would no longer like to receive these types of communications, please e-mail [email or web address]

## 2020-07-29 ENCOUNTER — OFFICE VISIT (OUTPATIENT)
Dept: PSYCHIATRY | Facility: CLINIC | Age: 40
End: 2020-07-29
Payer: COMMERCIAL

## 2020-07-29 ENCOUNTER — LAB VISIT (OUTPATIENT)
Dept: LAB | Facility: HOSPITAL | Age: 40
End: 2020-07-29
Payer: COMMERCIAL

## 2020-07-29 ENCOUNTER — HOSPITAL ENCOUNTER (OUTPATIENT)
Dept: RADIOLOGY | Facility: HOSPITAL | Age: 40
Discharge: HOME | End: 2020-07-29
Attending: STUDENT IN AN ORGANIZED HEALTH CARE EDUCATION/TRAINING PROGRAM
Payer: COMMERCIAL

## 2020-07-29 DIAGNOSIS — F32.A DEPRESSION, UNSPECIFIED DEPRESSION TYPE: Primary | ICD-10-CM

## 2020-07-29 DIAGNOSIS — H90.3 ASYMMETRIC SNHL (SENSORINEURAL HEARING LOSS): ICD-10-CM

## 2020-07-29 DIAGNOSIS — F98.8 ATTENTION DEFICIT DISORDER, UNSPECIFIED HYPERACTIVITY PRESENCE: ICD-10-CM

## 2020-07-29 LAB
CREAT SERPL-MCNC: 0.8 MG/DL (ref 0.5–1.4)
EST. GFR  (AFRICAN AMERICAN): >60 ML/MIN/1.73 M^2
EST. GFR  (NON AFRICAN AMERICAN): >60 ML/MIN/1.73 M^2

## 2020-07-29 PROCEDURE — 99214 PR OFFICE/OUTPT VISIT, EST, LEVL IV, 30-39 MIN: ICD-10-PCS | Mod: 95,,, | Performed by: PSYCHIATRY & NEUROLOGY

## 2020-07-29 PROCEDURE — A9585 GADOBUTROL INJECTION: HCPCS | Performed by: STUDENT IN AN ORGANIZED HEALTH CARE EDUCATION/TRAINING PROGRAM

## 2020-07-29 PROCEDURE — 70553 MRI IAC/TEMPORAL BONES W W/O CONTRAST: ICD-10-PCS | Mod: 26,,, | Performed by: RADIOLOGY

## 2020-07-29 PROCEDURE — 25500020 PHARM REV CODE 255: Performed by: STUDENT IN AN ORGANIZED HEALTH CARE EDUCATION/TRAINING PROGRAM

## 2020-07-29 PROCEDURE — 99214 OFFICE O/P EST MOD 30 MIN: CPT | Mod: 95,,, | Performed by: PSYCHIATRY & NEUROLOGY

## 2020-07-29 PROCEDURE — 70553 MRI BRAIN STEM W/O & W/DYE: CPT | Mod: TC

## 2020-07-29 PROCEDURE — 36415 COLL VENOUS BLD VENIPUNCTURE: CPT

## 2020-07-29 PROCEDURE — 82565 ASSAY OF CREATININE: CPT

## 2020-07-29 PROCEDURE — 70553 MRI BRAIN STEM W/O & W/DYE: CPT | Mod: 26,,, | Performed by: RADIOLOGY

## 2020-07-29 RX ORDER — DEXTROAMPHETAMINE SACCHARATE, AMPHETAMINE ASPARTATE MONOHYDRATE, DEXTROAMPHETAMINE SULFATE AND AMPHETAMINE SULFATE 2.5; 2.5; 2.5; 2.5 MG/1; MG/1; MG/1; MG/1
10 CAPSULE, EXTENDED RELEASE ORAL EVERY MORNING
Qty: 30 CAPSULE | Refills: 0 | Status: SHIPPED | OUTPATIENT
Start: 2020-07-29 | End: 2020-09-02 | Stop reason: SDUPTHER

## 2020-07-29 RX ORDER — GADOBUTROL 604.72 MG/ML
7 INJECTION INTRAVENOUS
Status: COMPLETED | OUTPATIENT
Start: 2020-07-29 | End: 2020-07-29

## 2020-07-29 RX ORDER — VENLAFAXINE HYDROCHLORIDE 75 MG/1
75 CAPSULE, EXTENDED RELEASE ORAL EVERY MORNING
Qty: 90 CAPSULE | Refills: 0 | Status: SHIPPED | OUTPATIENT
Start: 2020-07-29 | End: 2020-09-22 | Stop reason: SDDI

## 2020-07-29 RX ORDER — ESCITALOPRAM OXALATE 20 MG/1
20 TABLET ORAL DAILY
Qty: 30 TABLET | Refills: 2 | Status: SHIPPED | OUTPATIENT
Start: 2020-07-29 | End: 2020-09-02 | Stop reason: SDUPTHER

## 2020-07-29 RX ADMIN — GADOBUTROL 7 ML: 604.72 INJECTION INTRAVENOUS at 06:07

## 2020-07-29 NOTE — PROGRESS NOTES
"Outpatient Psychiatry Follow-Up Visit (MD/NP)    07/29/2020 The patient location is: home office  The chief complaint leading to consultation is: depression, anxiety, and ADD    Visit type: audiovisual    Face to Face time with patient: 14"  23 minutes of total time spent on the encounter, which includes face to face time and non-face to face time preparing to see the patient (eg, review of tests), Obtaining and/or reviewing separately obtained history, Documenting clinical information in the electronic or other health record, Independently interpreting results (not separately reported) and communicating results to the patient/family/caregiver, or Care coordination (not separately reported)        Each patient to whom he or she provides medical services by telemedicine is:  (1) informed of the relationship between the physician and patient and the respective role of any other health care provider with respect to management of the patient; and (2) notified that he or she may decline to receive medical services by telemedicine and may withdraw from such care at any time.    Notes: See below    Clinical Status of Patient:  Outpatient (Ambulatory)    Chief Complaint:  Depression and lack of focus    Interval History and Content of Current Session:Patient's focus is better with the Adderall. However , he is feelng more depressed at this time. He does not have thoughts of harming himself at this time and has no signs of ranjan or psychosis.  He continues to taper off the Effexor, having done better in the past with Lexapro. He is in good self control at this time. Patient was most concerned re his sadness and crying spells due to depression at this time which we discussed.    Compliance:good    Side effects:  Decrease in sleep if takes Adderall too late in day.  Musculoskeletal: patient had no complaints of abnormal motor movements and no evidence of such movements at this time.    Risk Parameters:not active danger to self " or others at this time    Patient's Response to Intervention:accepting    Progress Toward Goals and Other Mental Status Changes: fair     Vital signs this date were not reviewed.     Mental Status Evaluation     Appearance:  Neatly dressed and groomed   Behavior:  cooperative                             Speech normal   Mood:  anxious, depressed   Affect:  dysphoric   Thought Process:  linear, logical   Thought Content:  organizednormal   Sensorium:  alert and oriented to person, place, time and situation   Attention Span & Concentration able to focus   Cognition:  Grossly intactgrossly intact   Insight:  has awareness of illness   Judgment:  behavior is adequate to circumstances       Diagnosis:  ADD    Depression, unspecified depression type [F32.9]      I reviewed the side effects of the patient's medicines and advised a call if the patient had problems with the medicine or clinical condition.    Plan:(Medication and Therapy Recommendation)  Additional Notes: Patient agrees to increase Lexapro to 20 mg q am, and decrease Effexor XR to 75 mg q am,and continue Adderall XR 10 mg q am.  Return to Clinic:3 weeks

## 2020-07-31 ENCOUNTER — TELEPHONE (OUTPATIENT)
Dept: OTOLARYNGOLOGY | Facility: CLINIC | Age: 40
End: 2020-07-31

## 2020-07-31 NOTE — TELEPHONE ENCOUNTER
----- Message from Theodore Johns MD sent at 7/30/2020 10:14 AM CDT -----  PC pt and tell him MRI scan was OK with no significant abnl.    Thanks

## 2020-09-02 ENCOUNTER — OFFICE VISIT (OUTPATIENT)
Dept: PSYCHIATRY | Facility: CLINIC | Age: 40
End: 2020-09-02
Payer: COMMERCIAL

## 2020-09-02 DIAGNOSIS — F32.A DEPRESSION, UNSPECIFIED DEPRESSION TYPE: Primary | ICD-10-CM

## 2020-09-02 DIAGNOSIS — F98.8 ATTENTION DEFICIT DISORDER, UNSPECIFIED HYPERACTIVITY PRESENCE: ICD-10-CM

## 2020-09-02 PROCEDURE — 99214 OFFICE O/P EST MOD 30 MIN: CPT | Mod: 95,,, | Performed by: PSYCHIATRY & NEUROLOGY

## 2020-09-02 PROCEDURE — 99214 PR OFFICE/OUTPT VISIT, EST, LEVL IV, 30-39 MIN: ICD-10-PCS | Mod: 95,,, | Performed by: PSYCHIATRY & NEUROLOGY

## 2020-09-02 RX ORDER — ESCITALOPRAM OXALATE 20 MG/1
20 TABLET ORAL DAILY
Qty: 30 TABLET | Refills: 2 | Status: SHIPPED | OUTPATIENT
Start: 2020-09-02 | End: 2020-10-14 | Stop reason: SDUPTHER

## 2020-09-02 RX ORDER — DEXTROAMPHETAMINE SACCHARATE, AMPHETAMINE ASPARTATE MONOHYDRATE, DEXTROAMPHETAMINE SULFATE AND AMPHETAMINE SULFATE 2.5; 2.5; 2.5; 2.5 MG/1; MG/1; MG/1; MG/1
10 CAPSULE, EXTENDED RELEASE ORAL EVERY MORNING
Qty: 30 CAPSULE | Refills: 0 | Status: SHIPPED | OUTPATIENT
Start: 2020-09-02 | End: 2021-04-20 | Stop reason: SDUPTHER

## 2020-09-02 NOTE — PROGRESS NOTES
"Outpatient Psychiatry Follow-Up Visit (MD/NP)    09/02/2020 The patient location is: home office  The chief complaint leading to consultation is: depression and loss of focus    Visit type: audiovisual    Face to Face time with patient: 13"  19 minutes of total time spent on the encounter, which includes face to face time and non-face to face time preparing to see the patient (eg, review of tests), Obtaining and/or reviewing separately obtained history, Documenting clinical information in the electronic or other health record, Independently interpreting results (not separately reported) and communicating results to the patient/family/caregiver, or Care coordination (not separately reported).         Each patient to whom he or she provides medical services by telemedicine is:  (1) informed of the relationship between the physician and patient and the respective role of any other health care provider with respect to management of the patient; and (2) notified that he or she may decline to receive medical services by telemedicine and may withdraw from such care at any time.    Notes: See below    Clinical Status of Patient:  Outpatient (Ambulatory)    Chief Complaint:  Depression and lack of focus.    Interval History and Content of Current Session:Patient is now doing better. He is tapering off Effexor successfully, and his mood is more stable as well. Patient has no thoughts of harming himself and no sgns of ranjan or psychosis. He is pleased with his improvement. He is now less sad and less tearful. He is pleased with his current condition and progress. His focus is also improved.    Compliance:  good    Side effects:  None  Musculoskeletal: patient had no abnormal motor movements of any kind.    Risk Parameters:not active danger to self or others at this time.    Patient's Response to Intervention:accepting    Progress Toward Goals and Other Mental Status Changes:good     Vital signs this date were not reviewed. "     Mental Status Evaluation     Appearance:  Neatly dressed and groomed   Behavior:  cooperative                             Speech normal   Mood:  steady   Affect:  euthymic   Thought Process:  linear, logical   Thought Content:  organizednormal   Sensorium:  alert and oriented to person, place, time and situation   Attention Span & Concentration able to focus   Cognition:  Grossly intactgrossly intact   Insight:  has awareness of illness   Judgment:  behavior is adequate to circumstances       Diagnosis:  ADD    Depression, unspecified depression type [F32.9]      I reviewed the side effects of the patient's medicines and advised a call if the patient had problems with the medicine or clinical condition.    Plan:(Medication and Therapy Recommendation)  Additional Notes: Patient agrees to continue Lexapro 20 mg daily, and Adderall XR 10mg q am, and to complete tapering off the Effexor(given schedule.)  Return to Clinic:3 weeks

## 2020-09-22 ENCOUNTER — OFFICE VISIT (OUTPATIENT)
Dept: PSYCHIATRY | Facility: CLINIC | Age: 40
End: 2020-09-22
Payer: COMMERCIAL

## 2020-09-22 DIAGNOSIS — F98.8 ATTENTION DEFICIT DISORDER, UNSPECIFIED HYPERACTIVITY PRESENCE: ICD-10-CM

## 2020-09-22 DIAGNOSIS — F32.A DEPRESSION, UNSPECIFIED DEPRESSION TYPE: Primary | ICD-10-CM

## 2020-09-22 PROCEDURE — 99214 PR OFFICE/OUTPT VISIT, EST, LEVL IV, 30-39 MIN: ICD-10-PCS | Mod: 95,,, | Performed by: PSYCHIATRY & NEUROLOGY

## 2020-09-22 PROCEDURE — 99214 OFFICE O/P EST MOD 30 MIN: CPT | Mod: 95,,, | Performed by: PSYCHIATRY & NEUROLOGY

## 2020-09-22 NOTE — PROGRESS NOTES
"Outpatient Psychiatry Follow-Up Visit (MD/NP)    09/22/2020 The patient location is: home  The chief complaint leading to consultation is: Depression and ADD    Visit type: audiovisual    Face to Face time with patient: 19"  24 minutes of total time spent on the encounter, which includes face to face time and non-face to face time preparing to see the patient (eg, review of tests), Obtaining and/or reviewing separately obtained history, Documenting clinical information in the electronic or other health record, Independently interpreting results (not separately reported) and communicating results to the patient/family/caregiver, or Care coordination (not separately reported).         Each patient to whom he or she provides medical services by telemedicine is:  (1) informed of the relationship between the physician and patient and the respective role of any other health care provider with respect to management of the patient; and (2) notified that he or she may decline to receive medical services by telemedicine and may withdraw from such care at any time.    Notes: See below    Clinical Status of Patient:  Outpatient (Ambulatory)    Chief Complaint:  Depression and ADD    Interval History and Content of Current Session:Patient feels his mood is improving, and he has successfully tapered off of the Effexor. 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 Patient is in good self control and has no active thoughts of harming himself or others. He has no signs of ranjan or psychosis. We discussed the need for him to decrease his use of energy drinks as well and to start a regular exercise program.We also discussed his longstanding difficulty sleeping.  Compliance: good     Side effects: possible increased sweating.  Musculoskeletal: patient had no abnormal motor movements of any kind.     Risk Parameters:not active danger " to self or others at this time.    Patient's Response to Intervention:accepting.    Progress Toward Goals and Other Mental Status Changes:good     Vital signs this date were not reviewed.     Mental Status Evaluation     Appearance:  Neatly dressed and groomed   Behavior:  cooperative                             Speech normal   Mood:  steady   Affect:  euthymic   Thought Process:  linear, logical   Thought Content:  organizednormal   Sensorium:  alert and oriented to person, place, time and situation   Attention Span & Concentration able to focus   Cognition:  Grossly intactgrossly intact   Insight:  has awareness of illness   Judgment:  behavior is adequate to circumstances       Diagnosis:Depression  ADD      No primary diagnosis found.    I reviewed the side effects of the patient's medicines and advised a call if the patient had problems with the medicine or clinical condition.    Plan:(Medication and Therapy Recommendation)  Additional Notes: Patient agree to continue Lexapro 20 mg daily and Adderall XR 10 mg q am.  Return to Clinic:3 to 4 weeks.

## 2020-09-28 ENCOUNTER — TELEPHONE (OUTPATIENT)
Dept: AUDIOLOGY | Facility: CLINIC | Age: 40
End: 2020-09-28

## 2020-10-05 ENCOUNTER — PATIENT MESSAGE (OUTPATIENT)
Dept: PSYCHIATRY | Facility: CLINIC | Age: 40
End: 2020-10-05

## 2020-10-14 ENCOUNTER — OFFICE VISIT (OUTPATIENT)
Dept: PSYCHIATRY | Facility: CLINIC | Age: 40
End: 2020-10-14
Payer: COMMERCIAL

## 2020-10-14 DIAGNOSIS — F98.8 ATTENTION DEFICIT DISORDER, UNSPECIFIED HYPERACTIVITY PRESENCE: Primary | ICD-10-CM

## 2020-10-14 DIAGNOSIS — F32.A DEPRESSION, UNSPECIFIED DEPRESSION TYPE: ICD-10-CM

## 2020-10-14 PROCEDURE — 99214 PR OFFICE/OUTPT VISIT, EST, LEVL IV, 30-39 MIN: ICD-10-PCS | Mod: 95,,, | Performed by: PSYCHIATRY & NEUROLOGY

## 2020-10-14 PROCEDURE — 99214 OFFICE O/P EST MOD 30 MIN: CPT | Mod: 95,,, | Performed by: PSYCHIATRY & NEUROLOGY

## 2020-10-14 RX ORDER — BUSPIRONE HYDROCHLORIDE 5 MG/1
5 TABLET ORAL 2 TIMES DAILY
Qty: 60 TABLET | Refills: 3 | Status: SHIPPED | OUTPATIENT
Start: 2020-10-14 | End: 2021-01-10

## 2020-10-14 RX ORDER — ESCITALOPRAM OXALATE 20 MG/1
20 TABLET ORAL DAILY
Qty: 30 TABLET | Refills: 2 | Status: SHIPPED | OUTPATIENT
Start: 2020-10-14 | End: 2021-01-10

## 2020-10-14 NOTE — PROGRESS NOTES
"Outpatient Psychiatry Follow-Up Visit (MD/NP)    10/14/2020  The patient location is: car  The chief complaint leading to consultation is: depression and ADD    Visit type: audiovisual    Face to Face time with patient: 17"  23 minutes of total time spent on the encounter, which includes face to face time and non-face to face time preparing to see the patient (eg, review of tests), Obtaining and/or reviewing separately obtained history, Documenting clinical information in the electronic or other health record, Independently interpreting results (not separately reported) and communicating results to the patient/family/caregiver, or Care coordination (not separately reported).         Each patient to whom he or she provides medical services by telemedicine is:  (1) informed of the relationship between the physician and patient and the respective role of any other health care provider with respect to management of the patient; and (2) notified that he or she may decline to receive medical services by telemedicine and may withdraw from such care at any time.    Notes: See below.    Clinical Status of Patient:  Outpatient (Ambulatory)    Chief Complaint:  Depression and ADD    Interval History and Content of Current Session: Patient still has focus problems but he had to stop the Adderall due to an increase in his blood pressure. Patient has no thoughts of harming himself at this time. Patient denies active thoughts of harming himself at this time. He is using marijuana to calm down. We discussed the use if Buspar to help with his anxiety and increase his resiliency to his stresses.  Patient has no signs of ranjan or psychosis. We discussed other means to relax and reduce his stress and blood pressure. Patient advised not to use marijuana.    Compliance:  good    Side effects:  Increase in blood pressure  Musculoskeletal: patient had no abnormal motor movements of any kind.    Risk Parameters:not active danger to self or " others at this time.    Patient's Response to Intervention:accepting.  Progress Toward Goals and Other Mental Status Changes:fair   Vital signs this date were reviewed.     Mental Status Evaluation     Appearance:  Neatly dressed and groomed   Behavior:  cooperative                             Speech normal   Mood:  anxious   Affect:  dysphoric and anxious   Thought Process:  linear, logical   Thought Content:  organizednormal   Sensorium:  alert and oriented to person, place, time and situation   Attention Span & Concentration able to focus   Cognition:  Grossly intactgrossly intact   Insight:  has awareness of illness   Judgment:  behavior is adequate to circumstances       Diagnosis:Major Depressive Disorder, Mild Recurrent      Attention deficit disorder, unspecified hyperactivity presence [F98.8]      I reviewed the side effects of the patient's medicines and advised a call if the patient had problems with the medicine or clinical condition.    Plan:(Medication and Therapy Recommendation)  Additional Notes: Patient agrees to stay off the Adderall due to the blood pressure problem, and to start Buspar 5 mg bid vis his stress and continue Lexapro 20 mg q am.   Return to Clinic:3 to 4 weeks.

## 2020-11-07 ENCOUNTER — PATIENT MESSAGE (OUTPATIENT)
Dept: INTERNAL MEDICINE | Facility: CLINIC | Age: 40
End: 2020-11-07

## 2020-11-07 ENCOUNTER — OFFICE VISIT (OUTPATIENT)
Dept: URGENT CARE | Facility: CLINIC | Age: 40
End: 2020-11-07
Payer: COMMERCIAL

## 2020-11-07 VITALS
OXYGEN SATURATION: 96 % | TEMPERATURE: 97 F | DIASTOLIC BLOOD PRESSURE: 94 MMHG | SYSTOLIC BLOOD PRESSURE: 136 MMHG | WEIGHT: 142 LBS | HEART RATE: 72 BPM | BODY MASS INDEX: 19.88 KG/M2 | RESPIRATION RATE: 16 BRPM | HEIGHT: 71 IN

## 2020-11-07 DIAGNOSIS — J01.90 ACUTE BACTERIAL SINUSITIS: Primary | ICD-10-CM

## 2020-11-07 DIAGNOSIS — B96.89 ACUTE BACTERIAL SINUSITIS: Primary | ICD-10-CM

## 2020-11-07 DIAGNOSIS — Z20.822 SUSPECTED COVID-19 VIRUS INFECTION: ICD-10-CM

## 2020-11-07 LAB
CTP QC/QA: YES
SARS-COV-2 RDRP RESP QL NAA+PROBE: NEGATIVE

## 2020-11-07 PROCEDURE — 99214 OFFICE O/P EST MOD 30 MIN: CPT | Mod: S$GLB,,, | Performed by: NURSE PRACTITIONER

## 2020-11-07 PROCEDURE — 99214 PR OFFICE/OUTPT VISIT, EST, LEVL IV, 30-39 MIN: ICD-10-PCS | Mod: S$GLB,,, | Performed by: NURSE PRACTITIONER

## 2020-11-07 PROCEDURE — U0002: ICD-10-PCS | Mod: QW,S$GLB,, | Performed by: NURSE PRACTITIONER

## 2020-11-07 PROCEDURE — U0002 COVID-19 LAB TEST NON-CDC: HCPCS | Mod: QW,S$GLB,, | Performed by: NURSE PRACTITIONER

## 2020-11-07 RX ORDER — PREDNISONE 20 MG/1
20 TABLET ORAL DAILY
Qty: 3 TABLET | Refills: 0 | Status: SHIPPED | OUTPATIENT
Start: 2020-11-07 | End: 2020-11-10

## 2020-11-07 RX ORDER — AMOXICILLIN AND CLAVULANATE POTASSIUM 875; 125 MG/1; MG/1
1 TABLET, FILM COATED ORAL 2 TIMES DAILY
Qty: 14 TABLET | Refills: 0 | Status: SHIPPED | OUTPATIENT
Start: 2020-11-07 | End: 2020-11-14

## 2020-11-07 NOTE — PATIENT INSTRUCTIONS
Use an antihistamine such as Claritin, Zyrtec or Allegra to dry you out.     Use pseudoephedrine (behind the counter) to decongest. Pseudoephedrine  30 mg up to 240 mg /day. It can raise your blood pressure and give you palpitations.    Use Flonase 1-2 sprays/nostril per day. It is a local acting steroid nasal spray, if you develop a bloody nose, stop using the medication immediately.    Use warm salt water gargles to ease your throat pain. Warm salt water gargles as needed for sore throat-  1/2 tsp salt to 1 cup warm water, gargle as desired. Hot tea with honey.     Take ibuprofen and tylenol for minor pain.     Increase fluid intake.     Follow up with primary care provider for annual exam and blood work.     You must understand that you've received an Urgent Care treatment only and that you may be released before all your medical problems are known or treated. You, the patient, will arrange for follow up care as instructed.  Follow up with your PCP or specialty clinic as directed in the next 1-2 weeks if not improved or as needed.  You can call [phone] to schedule an appointment with the appropriate provider.  If your condition worsens we recommend that you receive another evaluation at the emergency room immediately or contact your primary medical clinics after hours call service to discuss your concerns.  Please return here or go to the Emergency Department for any concerns or worsening of condition.      Sinusitis (Antibiotic Treatment)    The sinuses are air-filled spaces within the bones of the face. They connect to the inside of the nose. Sinusitis is an inflammation of the tissue lining the sinus cavity. Sinus inflammation can occur during a cold. It can also be due to allergies to pollens and other particles in the air. Sinusitis can cause symptoms of sinus congestion and fullness. A sinus infection causes fever, headache and facial pain. There is often green or yellow drainage from the nose or  into the back of the throat (post-nasal drip). You have been given antibiotics to treat this condition.  Home care:  · Take the full course of antibiotics as instructed. Do not stop taking them, even if you feel better.  · Drink plenty of water, hot tea, and other liquids. This may help thin mucus. It also may promote sinus drainage.  · Heat may help soothe painful areas of the face. Use a towel soaked in hot water. Or,  the shower and direct the hot spray onto your face. Using a vaporizer along with a menthol rub at night may also help.   · An expectorant containing guaifenesin may help thin the mucus and promote drainage from the sinuses.  · Over-the-counter decongestants may be used unless a similar medicine was prescribed. Nasal sprays work the fastest. Use one that contains phenylephrine or oxymetazoline. First blow the nose gently. Then use the spray. Do not use these medicines more often than directed on the label or symptoms may get worse. You may also use tablets containing pseudoephedrine. Avoid products that combine ingredients, because side effects may be increased. Read labels. You can also ask the pharmacist for help. (NOTE: Persons with high blood pressure should not use decongestants. They can raise blood pressure.)  · Over-the-counter antihistamines may help if allergies contributed to your sinusitis.    · Do not use nasal rinses or irrigation during an acute sinus infection, unless told to by your health care provider. Rinsing may spread the infection to other sinuses.  · Use acetaminophen or ibuprofen to control pain, unless another pain medicine was prescribed. (If you have chronic liver or kidney disease or ever had a stomach ulcer, talk with your doctor before using these medicines. Aspirin should never be used in anyone under 18 years of age who is ill with a fever. It may cause severe liver damage.)  · Don't smoke. This can worsen symptoms.  Follow-up care  Follow up with your  healthcare provider or our staff if you are not improving within the next week.  When to seek medical advice  Call your healthcare provider if any of these occur:  · Facial pain or headache becoming more severe  · Stiff neck  · Unusual drowsiness or confusion  · Swelling of the forehead or eyelids  · Vision problems, including blurred or double vision  · Fever of 100.4ºF (38ºC) or higher, or as directed by your healthcare provider  · Seizure  · Breathing problems  · Symptoms not resolving within 10 days  Date Last Reviewed: 4/13/2015 © 2000-2017 [employer]. [address] [place] [id number]. All rights reserved. This information is not intended as a substitute for professional medical care. Always follow your healthcare professional's instructions.

## 2020-11-07 NOTE — PROGRESS NOTES
"Subjective:       Patient ID: Wilbur Diop is a 40 y.o. male.    Vitals:  height is 5' 11" (1.803 m) and weight is 64.4 kg (142 lb). His temperature is 96.9 °F (36.1 °C). His blood pressure is 136/94 (abnormal) and his pulse is 72. His respiration is 16 and oxygen saturation is 96%.     Chief Complaint: COVID-19 Concerns    Sinus Problem  This is a new problem. The current episode started 1 to 4 weeks ago (3 WEEKS COMES AND GOES). The problem is unchanged. There has been no fever. His pain is at a severity of 3/10. The pain is mild. Associated symptoms include chills, congestion, coughing, diaphoresis, sinus pressure and a sore throat. Pertinent negatives include no ear pain or shortness of breath. Past treatments include oral decongestants. The treatment provided no relief.       Constitution: Positive for chills, sweating, fatigue and fever.   HENT: Positive for congestion, postnasal drip, sinus pain, sinus pressure and sore throat. Negative for ear pain and voice change.    Neck: Negative for painful lymph nodes.   Eyes: Negative for eye redness.   Respiratory: Positive for cough and sputum production. Negative for chest tightness, bloody sputum, COPD, shortness of breath, stridor, wheezing and asthma.    Gastrointestinal: Negative for nausea and vomiting.   Musculoskeletal: Positive for muscle ache.   Skin: Negative for rash.   Allergic/Immunologic: Negative for seasonal allergies and asthma.   Hematologic/Lymphatic: Negative for swollen lymph nodes.       Objective:      Physical Exam   Constitutional: He is oriented to person, place, and time. He appears well-developed. He is cooperative.  Non-toxic appearance. He does not appear ill. No distress.   HENT:   Head: Normocephalic and atraumatic.   Ears:   Right Ear: Hearing, external ear and ear canal normal. Tympanic membrane is erythematous. A middle ear effusion is present.   Left Ear: Hearing, external ear and ear canal normal. A middle ear effusion is " present.   Nose: No mucosal edema, rhinorrhea or nasal deformity. No epistaxis. Right sinus exhibits maxillary sinus tenderness and frontal sinus tenderness. Left sinus exhibits maxillary sinus tenderness and frontal sinus tenderness.   Mouth/Throat: Uvula is midline and mucous membranes are normal. No trismus in the jaw. Normal dentition. No uvula swelling. Posterior oropharyngeal erythema present. No oropharyngeal exudate or posterior oropharyngeal edema.   PND      Comments: PND  Eyes: Conjunctivae and lids are normal. No scleral icterus.   Neck: Trachea normal, full passive range of motion without pain and phonation normal. Neck supple. No neck rigidity. No edema and no erythema present.   Cardiovascular: Normal rate, regular rhythm, normal heart sounds and normal pulses.   Pulmonary/Chest: Effort normal and breath sounds normal. No respiratory distress. He has no decreased breath sounds. He has no rhonchi.   Cough present  Lungs clear to ausculation     Comments: Cough present  Lungs clear to ausculation     Abdominal: Normal appearance.   Musculoskeletal: Normal range of motion.         General: No deformity.   Neurological: He is alert and oriented to person, place, and time. He exhibits normal muscle tone. Coordination normal.   Skin: Skin is warm, dry, intact, not diaphoretic and not pale. Psychiatric: His speech is normal and behavior is normal. Judgment and thought content normal.   Nursing note and vitals reviewed.        Results for orders placed or performed in visit on 11/07/20   POCT COVID-19 Rapid Screening   Result Value Ref Range    POC Rapid COVID Negative Negative     Acceptable Yes      Assessment:       1. Acute bacterial sinusitis    2. Suspected COVID-19 virus infection        Plan:         Acute bacterial sinusitis  -     amoxicillin-clavulanate 875-125mg (AUGMENTIN) 875-125 mg per tablet; Take 1 tablet by mouth 2 (two) times daily. for 7 days  Dispense: 14 tablet; Refill:  0  -     predniSONE (DELTASONE) 20 MG tablet; Take 1 tablet (20 mg total) by mouth once daily. for 3 days  Dispense: 3 tablet; Refill: 0    Suspected COVID-19 virus infection  -     POCT COVID-19 Rapid Screening      Patient Instructions   Use an antihistamine such as Claritin, Zyrtec or Allegra to dry you out.     Use pseudoephedrine (behind the counter) to decongest. Pseudoephedrine  30 mg up to 240 mg /day. It can raise your blood pressure and give you palpitations.    Use Flonase 1-2 sprays/nostril per day. It is a local acting steroid nasal spray, if you develop a bloody nose, stop using the medication immediately.    Use warm salt water gargles to ease your throat pain. Warm salt water gargles as needed for sore throat-  1/2 tsp salt to 1 cup warm water, gargle as desired. Hot tea with honey.     Take ibuprofen and tylenol for minor pain.     Increase fluid intake.     Follow up with primary care provider for annual exam and blood work.     You must understand that you've received an Urgent Care treatment only and that you may be released before all your medical problems are known or treated. You, the patient, will arrange for follow up care as instructed.  Follow up with your PCP or specialty clinic as directed in the next 1-2 weeks if not improved or as needed.  You can call [phone] to schedule an appointment with the appropriate provider.  If your condition worsens we recommend that you receive another evaluation at the emergency room immediately or contact your primary medical clinics after hours call service to discuss your concerns.  Please return here or go to the Emergency Department for any concerns or worsening of condition.      Sinusitis (Antibiotic Treatment)    The sinuses are air-filled spaces within the bones of the face. They connect to the inside of the nose. Sinusitis is an inflammation of the tissue lining the sinus cavity. Sinus inflammation can occur during a cold. It can also be  due to allergies to pollens and other particles in the air. Sinusitis can cause symptoms of sinus congestion and fullness. A sinus infection causes fever, headache and facial pain. There is often green or yellow drainage from the nose or into the back of the throat (post-nasal drip). You have been given antibiotics to treat this condition.  Home care:  · Take the full course of antibiotics as instructed. Do not stop taking them, even if you feel better.  · Drink plenty of water, hot tea, and other liquids. This may help thin mucus. It also may promote sinus drainage.  · Heat may help soothe painful areas of the face. Use a towel soaked in hot water. Or,  the shower and direct the hot spray onto your face. Using a vaporizer along with a menthol rub at night may also help.   · An expectorant containing guaifenesin may help thin the mucus and promote drainage from the sinuses.  · Over-the-counter decongestants may be used unless a similar medicine was prescribed. Nasal sprays work the fastest. Use one that contains phenylephrine or oxymetazoline. First blow the nose gently. Then use the spray. Do not use these medicines more often than directed on the label or symptoms may get worse. You may also use tablets containing pseudoephedrine. Avoid products that combine ingredients, because side effects may be increased. Read labels. You can also ask the pharmacist for help. (NOTE: Persons with high blood pressure should not use decongestants. They can raise blood pressure.)  · Over-the-counter antihistamines may help if allergies contributed to your sinusitis.    · Do not use nasal rinses or irrigation during an acute sinus infection, unless told to by your health care provider. Rinsing may spread the infection to other sinuses.  · Use acetaminophen or ibuprofen to control pain, unless another pain medicine was prescribed. (If you have chronic liver or kidney disease or ever had a stomach ulcer, talk with your doctor  before using these medicines. Aspirin should never be used in anyone under 18 years of age who is ill with a fever. It may cause severe liver damage.)  · Don't smoke. This can worsen symptoms.  Follow-up care  Follow up with your healthcare provider or our staff if you are not improving within the next week.  When to seek medical advice  Call your healthcare provider if any of these occur:  · Facial pain or headache becoming more severe  · Stiff neck  · Unusual drowsiness or confusion  · Swelling of the forehead or eyelids  · Vision problems, including blurred or double vision  · Fever of 100.4ºF (38ºC) or higher, or as directed by your healthcare provider  · Seizure  · Breathing problems  · Symptoms not resolving within 10 days  Date Last Reviewed: 4/13/2015  © 6334-5198 [employer]. [address] [place] [address], [place] [address]. All rights reserved. This information is not intended as a substitute for professional medical care. Always follow your healthcare professional's instructions.

## 2020-11-10 ENCOUNTER — OFFICE VISIT (OUTPATIENT)
Dept: PSYCHIATRY | Facility: CLINIC | Age: 40
End: 2020-11-10
Payer: COMMERCIAL

## 2020-11-10 DIAGNOSIS — F32.A DEPRESSION, UNSPECIFIED DEPRESSION TYPE: Primary | ICD-10-CM

## 2020-11-10 PROCEDURE — 99214 OFFICE O/P EST MOD 30 MIN: CPT | Mod: 95,,, | Performed by: PSYCHIATRY & NEUROLOGY

## 2020-11-10 PROCEDURE — 90833 PR PSYCHOTHERAPY W/PATIENT W/E&M, 30 MIN (ADD ON): ICD-10-PCS | Mod: 95,,, | Performed by: PSYCHIATRY & NEUROLOGY

## 2020-11-10 PROCEDURE — 90833 PSYTX W PT W E/M 30 MIN: CPT | Mod: 95,,, | Performed by: PSYCHIATRY & NEUROLOGY

## 2020-11-10 PROCEDURE — 99214 PR OFFICE/OUTPT VISIT, EST, LEVL IV, 30-39 MIN: ICD-10-PCS | Mod: 95,,, | Performed by: PSYCHIATRY & NEUROLOGY

## 2020-11-10 NOTE — PROGRESS NOTES
"Outpatient Psychiatry Follow-Up Visit (MD/NP)    11/10/2020 Phone visit. Patient could not get into virtual system.    Clinical Status of Patient:  Outpatient (Ambulatory)    Chief Complaint:  Wilbur Diop is a 40 y.o. male who presents today for follow-up of depression.  Met with patient and no relatives with him..      Interval History and Content of Current Session:  Interim Events/Subjective Report/Content of Current Session: Patient tried many times to get into the virtual system and could not. He is in his car at this time and may be due to lack of wifi coverage where he is located. He is still concerned re his BP. He is trying to walk more for exercise to reduce his BP. Patient is in good self control now and has no thoughts of harming himself or others. He has no signs of ranjan or psychosis. He is currently compliant with his meds. He feels his depression is now more stable. He is pleased with the reduced fluctuation with his moods at this time. He discussed problems with his teenage daughter. 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    Psychotherapy:  · Target symptoms: depression, anxiety   · Why chosen therapy is appropriate versus another modality: relevant to diagnosis  · Outcome monitoring methods: self-report  · Therapeutic intervention type: supportive psychotherapy  · Topics discussed/themes: job stresses, parenting issues  · The patient's response to the intervention is accepting. The patient's progress toward treatment goals is fair.   · Duration of intervention: 27 minutes.(7" on meds, and 20" supportive guidance.    Review of Systems   · PSYCHIATRIC: Pertinant items are noted in the narrative.    Past Medical, Family and Social History: The patient's past medical, family and social history have been reviewed and updated as appropriate within the electronic " medical record - see encounter notes.    Compliance: yes    Side effects: None    Risk Parameters:  Patient reports no suicidal ideation  Patient reports no homicidal ideation  Patient reports no self-injurious behavior  Patient reports no violent behavior    Exam (detailed: at least 9 elements; comprehensive: all 15 elements)   Constitutional  Vitals:  Most recent vital signs, dated less than 90 days prior to this appointment, were reviewed.   There were no vitals filed for this visit.Increase in BP continues without the ADderall, and he is seeing PCP for that problem.     General:  unremarkable, age appropriate, not assessed     Musculoskeletal  Muscle Strength/Tone:  no atrophy, patient reports adequate muscle strength at this time   Gait & Station:  non-ataxic     Psychiatric  Speech:  no latency; no press, spontaneous   Mood & Affect:  steady  congruent and appropriate, anxious   Thought Process:  normal and logical   Associations:  intact   Thought Content:  normal, no suicidality, no homicidality, delusions, or paranoia   Insight:  has awareness of illness   Judgement: behavior is adequate to circumstances   Orientation:  grossly intact   Memory: intact for content of interview   Language: grossly intact   Attention Span & Concentration:  able to focus   Fund of Knowledge:  intact and appropriate to age and level of education     Assessment and Diagnosis   Status/Progress: Based on the examination today, the patient's problem(s) is/are adequately but not ideally controlled.  New problems have been presented today.Problems with his daughter concerns re his daughter are not complicating management of the primary condition.  The working differential for this patient includes depression and anxiety.     General Impression: Patient feels more resilient to stress but feels he is not yet coping with them as well as he would like. He is concerned putting personal matters secondary to his work demands. He remains in  good self control. I again cautioned him re THC use, and its interaction with his medicines.     No diagnosis found.    Intervention/Counseling/Treatment Plan   · Medication Management: The risks and benefits of medication were discussed with the patient. Patient agrees to continue Buspar 5 mg bid, and Lexapro 20 mg daily.      Return to Clinic: 6 weeks

## 2020-12-30 NOTE — PROGRESS NOTES
12/30/2020: I called patient four times this afternoon re his 1pm appointment for which he did not show up. I could not leave a message in that he has no voice mail. The calls were an effort to reach him to attempt to make a connection for the appointment. I gave up after 4 calls and after 20'' of the appointment were already gone.

## 2021-01-02 ENCOUNTER — OFFICE VISIT (OUTPATIENT)
Dept: URGENT CARE | Facility: CLINIC | Age: 41
End: 2021-01-02
Payer: COMMERCIAL

## 2021-01-02 VITALS
DIASTOLIC BLOOD PRESSURE: 85 MMHG | HEIGHT: 71 IN | BODY MASS INDEX: 22.4 KG/M2 | TEMPERATURE: 98 F | RESPIRATION RATE: 18 BRPM | WEIGHT: 160 LBS | SYSTOLIC BLOOD PRESSURE: 130 MMHG | HEART RATE: 85 BPM | OXYGEN SATURATION: 99 %

## 2021-01-02 DIAGNOSIS — R09.82 POST-NASAL DRIP: ICD-10-CM

## 2021-01-02 DIAGNOSIS — B96.89 ACUTE BACTERIAL SINUSITIS: Primary | ICD-10-CM

## 2021-01-02 DIAGNOSIS — R51.9 SINUS HEADACHE: ICD-10-CM

## 2021-01-02 DIAGNOSIS — J01.90 ACUTE BACTERIAL SINUSITIS: Primary | ICD-10-CM

## 2021-01-02 LAB
CTP QC/QA: YES
CTP QC/QA: YES
POC MOLECULAR INFLUENZA A AGN: NEGATIVE
POC MOLECULAR INFLUENZA B AGN: NEGATIVE
SARS-COV-2 RDRP RESP QL NAA+PROBE: NEGATIVE

## 2021-01-02 PROCEDURE — 99214 PR OFFICE/OUTPT VISIT, EST, LEVL IV, 30-39 MIN: ICD-10-PCS | Mod: S$GLB,,, | Performed by: NURSE PRACTITIONER

## 2021-01-02 PROCEDURE — U0002 COVID-19 LAB TEST NON-CDC: HCPCS | Mod: QW,S$GLB,, | Performed by: NURSE PRACTITIONER

## 2021-01-02 PROCEDURE — 3008F PR BODY MASS INDEX (BMI) DOCUMENTED: ICD-10-PCS | Mod: CPTII,S$GLB,, | Performed by: NURSE PRACTITIONER

## 2021-01-02 PROCEDURE — 87502 POCT INFLUENZA A/B MOLECULAR: ICD-10-PCS | Mod: QW,S$GLB,, | Performed by: NURSE PRACTITIONER

## 2021-01-02 PROCEDURE — 99214 OFFICE O/P EST MOD 30 MIN: CPT | Mod: S$GLB,,, | Performed by: NURSE PRACTITIONER

## 2021-01-02 PROCEDURE — 87502 INFLUENZA DNA AMP PROBE: CPT | Mod: QW,S$GLB,, | Performed by: NURSE PRACTITIONER

## 2021-01-02 PROCEDURE — U0002: ICD-10-PCS | Mod: QW,S$GLB,, | Performed by: NURSE PRACTITIONER

## 2021-01-02 PROCEDURE — 3008F BODY MASS INDEX DOCD: CPT | Mod: CPTII,S$GLB,, | Performed by: NURSE PRACTITIONER

## 2021-01-02 RX ORDER — AMOXICILLIN AND CLAVULANATE POTASSIUM 875; 125 MG/1; MG/1
1 TABLET, FILM COATED ORAL 2 TIMES DAILY
Qty: 14 TABLET | Refills: 0 | Status: SHIPPED | OUTPATIENT
Start: 2021-01-02 | End: 2021-01-09

## 2021-01-02 RX ORDER — PREDNISONE 20 MG/1
20 TABLET ORAL DAILY
Qty: 3 TABLET | Refills: 0 | Status: SHIPPED | OUTPATIENT
Start: 2021-01-02 | End: 2021-01-05

## 2021-01-02 RX ORDER — ESCITALOPRAM OXALATE 10 MG/1
TABLET ORAL
COMMUNITY
Start: 2020-10-02 | End: 2021-01-29 | Stop reason: SDUPTHER

## 2021-01-29 ENCOUNTER — OFFICE VISIT (OUTPATIENT)
Dept: PSYCHIATRY | Facility: CLINIC | Age: 41
End: 2021-01-29
Payer: COMMERCIAL

## 2021-01-29 DIAGNOSIS — F32.A DEPRESSION, UNSPECIFIED DEPRESSION TYPE: Primary | ICD-10-CM

## 2021-01-29 DIAGNOSIS — F98.8 ATTENTION DEFICIT DISORDER, UNSPECIFIED HYPERACTIVITY PRESENCE: ICD-10-CM

## 2021-01-29 PROCEDURE — 90833 PSYTX W PT W E/M 30 MIN: CPT | Mod: 95,,, | Performed by: PSYCHIATRY & NEUROLOGY

## 2021-01-29 PROCEDURE — 99214 OFFICE O/P EST MOD 30 MIN: CPT | Mod: 95,,, | Performed by: PSYCHIATRY & NEUROLOGY

## 2021-01-29 PROCEDURE — 90833 PR PSYCHOTHERAPY W/PATIENT W/E&M, 30 MIN (ADD ON): ICD-10-PCS | Mod: 95,,, | Performed by: PSYCHIATRY & NEUROLOGY

## 2021-01-29 PROCEDURE — 99214 PR OFFICE/OUTPT VISIT, EST, LEVL IV, 30-39 MIN: ICD-10-PCS | Mod: 95,,, | Performed by: PSYCHIATRY & NEUROLOGY

## 2021-01-29 RX ORDER — BUSPIRONE HYDROCHLORIDE 10 MG/1
10 TABLET ORAL 2 TIMES DAILY
Qty: 60 TABLET | Refills: 3 | Status: SHIPPED | OUTPATIENT
Start: 2021-01-29 | End: 2021-04-20 | Stop reason: SDUPTHER

## 2021-01-29 RX ORDER — ESCITALOPRAM OXALATE 10 MG/1
TABLET ORAL
Qty: 30 TABLET | Refills: 3 | Status: SHIPPED | OUTPATIENT
Start: 2021-01-29 | End: 2021-04-20 | Stop reason: SDUPTHER

## 2021-03-07 ENCOUNTER — OFFICE VISIT (OUTPATIENT)
Dept: URGENT CARE | Facility: CLINIC | Age: 41
End: 2021-03-07
Payer: COMMERCIAL

## 2021-03-07 VITALS
HEIGHT: 71 IN | TEMPERATURE: 97 F | BODY MASS INDEX: 22.41 KG/M2 | SYSTOLIC BLOOD PRESSURE: 133 MMHG | HEART RATE: 70 BPM | DIASTOLIC BLOOD PRESSURE: 87 MMHG | WEIGHT: 160.06 LBS | OXYGEN SATURATION: 97 % | RESPIRATION RATE: 20 BRPM

## 2021-03-07 DIAGNOSIS — R51.9 NONINTRACTABLE EPISODIC HEADACHE, UNSPECIFIED HEADACHE TYPE: ICD-10-CM

## 2021-03-07 DIAGNOSIS — Z20.822 EXPOSURE TO COVID-19 VIRUS: Primary | ICD-10-CM

## 2021-03-07 LAB
CTP QC/QA: YES
SARS-COV-2 RDRP RESP QL NAA+PROBE: NEGATIVE

## 2021-03-07 PROCEDURE — 3008F BODY MASS INDEX DOCD: CPT | Mod: CPTII,S$GLB,, | Performed by: PHYSICIAN ASSISTANT

## 2021-03-07 PROCEDURE — 99212 PR OFFICE/OUTPT VISIT, EST, LEVL II, 10-19 MIN: ICD-10-PCS | Mod: S$GLB,,, | Performed by: PHYSICIAN ASSISTANT

## 2021-03-07 PROCEDURE — U0002: ICD-10-PCS | Mod: QW,S$GLB,, | Performed by: PHYSICIAN ASSISTANT

## 2021-03-07 PROCEDURE — U0002 COVID-19 LAB TEST NON-CDC: HCPCS | Mod: QW,S$GLB,, | Performed by: PHYSICIAN ASSISTANT

## 2021-03-07 PROCEDURE — 3008F PR BODY MASS INDEX (BMI) DOCUMENTED: ICD-10-PCS | Mod: CPTII,S$GLB,, | Performed by: PHYSICIAN ASSISTANT

## 2021-03-07 PROCEDURE — 99212 OFFICE O/P EST SF 10 MIN: CPT | Mod: S$GLB,,, | Performed by: PHYSICIAN ASSISTANT

## 2021-04-05 ENCOUNTER — PATIENT MESSAGE (OUTPATIENT)
Dept: ADMINISTRATIVE | Facility: HOSPITAL | Age: 41
End: 2021-04-05

## 2021-04-17 ENCOUNTER — PATIENT MESSAGE (OUTPATIENT)
Dept: ADMINISTRATIVE | Facility: HOSPITAL | Age: 41
End: 2021-04-17

## 2021-04-20 ENCOUNTER — TELEPHONE (OUTPATIENT)
Dept: AUDIOLOGY | Facility: CLINIC | Age: 41
End: 2021-04-20

## 2021-04-20 ENCOUNTER — PATIENT MESSAGE (OUTPATIENT)
Dept: PSYCHIATRY | Facility: CLINIC | Age: 41
End: 2021-04-20

## 2021-05-03 ENCOUNTER — OFFICE VISIT (OUTPATIENT)
Dept: PRIMARY CARE CLINIC | Facility: CLINIC | Age: 41
End: 2021-05-03
Payer: COMMERCIAL

## 2021-05-03 VITALS
DIASTOLIC BLOOD PRESSURE: 82 MMHG | HEART RATE: 82 BPM | BODY MASS INDEX: 34.45 KG/M2 | HEIGHT: 69 IN | WEIGHT: 232.56 LBS | OXYGEN SATURATION: 98 % | SYSTOLIC BLOOD PRESSURE: 114 MMHG

## 2021-05-03 DIAGNOSIS — Z23 NEED FOR VACCINATION: ICD-10-CM

## 2021-05-03 DIAGNOSIS — H91.90 HEARING LOSS, UNSPECIFIED HEARING LOSS TYPE, UNSPECIFIED LATERALITY: ICD-10-CM

## 2021-05-03 DIAGNOSIS — Z00.00 ANNUAL PHYSICAL EXAM: Primary | ICD-10-CM

## 2021-05-03 DIAGNOSIS — N39.43 POST-VOID DRIBBLING: ICD-10-CM

## 2021-05-03 DIAGNOSIS — Z11.4 SCREENING FOR HIV (HUMAN IMMUNODEFICIENCY VIRUS): ICD-10-CM

## 2021-05-03 DIAGNOSIS — F32.A DEPRESSION, UNSPECIFIED DEPRESSION TYPE: ICD-10-CM

## 2021-05-03 DIAGNOSIS — Z11.59 SCREENING FOR VIRAL DISEASE: ICD-10-CM

## 2021-05-03 DIAGNOSIS — F98.8 ATTENTION DEFICIT DISORDER, UNSPECIFIED HYPERACTIVITY PRESENCE: ICD-10-CM

## 2021-05-03 DIAGNOSIS — F41.9 ANXIETY: ICD-10-CM

## 2021-05-03 PROCEDURE — 3008F BODY MASS INDEX DOCD: CPT | Mod: CPTII,S$GLB,, | Performed by: INTERNAL MEDICINE

## 2021-05-03 PROCEDURE — 99396 PREV VISIT EST AGE 40-64: CPT | Mod: S$GLB,,, | Performed by: INTERNAL MEDICINE

## 2021-05-03 PROCEDURE — 99396 PR PREVENTIVE VISIT,EST,40-64: ICD-10-PCS | Mod: S$GLB,,, | Performed by: INTERNAL MEDICINE

## 2021-05-03 PROCEDURE — 99999 PR PBB SHADOW E&M-EST. PATIENT-LVL IV: ICD-10-PCS | Mod: PBBFAC,,, | Performed by: INTERNAL MEDICINE

## 2021-05-03 PROCEDURE — 1125F AMNT PAIN NOTED PAIN PRSNT: CPT | Mod: S$GLB,,, | Performed by: INTERNAL MEDICINE

## 2021-05-03 PROCEDURE — 99999 PR PBB SHADOW E&M-EST. PATIENT-LVL IV: CPT | Mod: PBBFAC,,, | Performed by: INTERNAL MEDICINE

## 2021-05-03 PROCEDURE — 1125F PR PAIN SEVERITY QUANTIFIED, PAIN PRESENT: ICD-10-PCS | Mod: S$GLB,,, | Performed by: INTERNAL MEDICINE

## 2021-05-03 PROCEDURE — 3008F PR BODY MASS INDEX (BMI) DOCUMENTED: ICD-10-PCS | Mod: CPTII,S$GLB,, | Performed by: INTERNAL MEDICINE

## 2021-05-04 ENCOUNTER — PATIENT MESSAGE (OUTPATIENT)
Dept: PRIMARY CARE CLINIC | Facility: CLINIC | Age: 41
End: 2021-05-04

## 2021-05-04 DIAGNOSIS — R79.89 LOW TESTOSTERONE: Primary | ICD-10-CM

## 2021-05-07 ENCOUNTER — OFFICE VISIT (OUTPATIENT)
Dept: UROLOGY | Facility: CLINIC | Age: 41
End: 2021-05-07
Payer: COMMERCIAL

## 2021-05-07 VITALS
HEART RATE: 75 BPM | SYSTOLIC BLOOD PRESSURE: 135 MMHG | HEIGHT: 69 IN | DIASTOLIC BLOOD PRESSURE: 86 MMHG | WEIGHT: 232 LBS | BODY MASS INDEX: 34.36 KG/M2

## 2021-05-07 DIAGNOSIS — N39.43 POST-VOID DRIBBLING: ICD-10-CM

## 2021-05-07 PROCEDURE — 99243 OFF/OP CNSLTJ NEW/EST LOW 30: CPT | Mod: S$GLB,,, | Performed by: UROLOGY

## 2021-05-07 PROCEDURE — 1126F AMNT PAIN NOTED NONE PRSNT: CPT | Mod: S$GLB,,, | Performed by: UROLOGY

## 2021-05-07 PROCEDURE — 99243 PR OFFICE CONSULTATION,LEVEL III: ICD-10-PCS | Mod: S$GLB,,, | Performed by: UROLOGY

## 2021-05-07 PROCEDURE — 3008F BODY MASS INDEX DOCD: CPT | Mod: CPTII,S$GLB,, | Performed by: UROLOGY

## 2021-05-07 PROCEDURE — 1126F PR PAIN SEVERITY QUANTIFIED, NO PAIN PRESENT: ICD-10-PCS | Mod: S$GLB,,, | Performed by: UROLOGY

## 2021-05-07 PROCEDURE — 99999 PR PBB SHADOW E&M-EST. PATIENT-LVL III: CPT | Mod: PBBFAC,,, | Performed by: UROLOGY

## 2021-05-07 PROCEDURE — 99999 PR PBB SHADOW E&M-EST. PATIENT-LVL III: ICD-10-PCS | Mod: PBBFAC,,, | Performed by: UROLOGY

## 2021-05-07 PROCEDURE — 3008F PR BODY MASS INDEX (BMI) DOCUMENTED: ICD-10-PCS | Mod: CPTII,S$GLB,, | Performed by: UROLOGY

## 2021-05-25 ENCOUNTER — OFFICE VISIT (OUTPATIENT)
Dept: PSYCHIATRY | Facility: CLINIC | Age: 41
End: 2021-05-25
Payer: COMMERCIAL

## 2021-05-25 DIAGNOSIS — F98.8 ATTENTION DEFICIT DISORDER, UNSPECIFIED HYPERACTIVITY PRESENCE: ICD-10-CM

## 2021-05-25 DIAGNOSIS — F32.A DEPRESSION, UNSPECIFIED DEPRESSION TYPE: ICD-10-CM

## 2021-05-25 DIAGNOSIS — F41.9 ANXIETY: ICD-10-CM

## 2021-05-25 DIAGNOSIS — F12.90 MARIJUANA USE, CONTINUOUS: Primary | ICD-10-CM

## 2021-05-25 PROCEDURE — 99214 PR OFFICE/OUTPT VISIT, EST, LEVL IV, 30-39 MIN: ICD-10-PCS | Mod: 95,,, | Performed by: PSYCHIATRY & NEUROLOGY

## 2021-05-25 PROCEDURE — 99214 OFFICE O/P EST MOD 30 MIN: CPT | Mod: 95,,, | Performed by: PSYCHIATRY & NEUROLOGY

## 2021-05-25 PROCEDURE — 90833 PR PSYCHOTHERAPY W/PATIENT W/E&M, 30 MIN (ADD ON): ICD-10-PCS | Mod: 95,,, | Performed by: PSYCHIATRY & NEUROLOGY

## 2021-05-25 PROCEDURE — 90833 PSYTX W PT W E/M 30 MIN: CPT | Mod: 95,,, | Performed by: PSYCHIATRY & NEUROLOGY

## 2021-05-25 RX ORDER — DEXTROAMPHETAMINE SACCHARATE, AMPHETAMINE ASPARTATE MONOHYDRATE, DEXTROAMPHETAMINE SULFATE AND AMPHETAMINE SULFATE 2.5; 2.5; 2.5; 2.5 MG/1; MG/1; MG/1; MG/1
10 CAPSULE, EXTENDED RELEASE ORAL EVERY MORNING
Qty: 30 CAPSULE | Refills: 0 | Status: SHIPPED | OUTPATIENT
Start: 2021-05-25 | End: 2021-07-27

## 2021-05-25 RX ORDER — BUSPIRONE HYDROCHLORIDE 10 MG/1
TABLET ORAL
Qty: 90 TABLET | Refills: 5 | Status: SHIPPED | OUTPATIENT
Start: 2021-05-25 | End: 2022-01-20

## 2021-06-22 ENCOUNTER — PATIENT MESSAGE (OUTPATIENT)
Dept: PSYCHIATRY | Facility: CLINIC | Age: 41
End: 2021-06-22

## 2021-07-27 ENCOUNTER — OFFICE VISIT (OUTPATIENT)
Dept: PSYCHIATRY | Facility: CLINIC | Age: 41
End: 2021-07-27
Payer: COMMERCIAL

## 2021-07-27 DIAGNOSIS — F32.A DEPRESSION, UNSPECIFIED DEPRESSION TYPE: Primary | ICD-10-CM

## 2021-07-27 PROCEDURE — 90833 PR PSYCHOTHERAPY W/PATIENT W/E&M, 30 MIN (ADD ON): ICD-10-PCS | Mod: 95,,, | Performed by: PSYCHIATRY & NEUROLOGY

## 2021-07-27 PROCEDURE — 90833 PSYTX W PT W E/M 30 MIN: CPT | Mod: 95,,, | Performed by: PSYCHIATRY & NEUROLOGY

## 2021-07-27 PROCEDURE — 99214 OFFICE O/P EST MOD 30 MIN: CPT | Mod: 95,,, | Performed by: PSYCHIATRY & NEUROLOGY

## 2021-07-27 PROCEDURE — 1160F PR REVIEW ALL MEDS BY PRESCRIBER/CLIN PHARMACIST DOCUMENTED: ICD-10-PCS | Mod: CPTII,,, | Performed by: PSYCHIATRY & NEUROLOGY

## 2021-07-27 PROCEDURE — 1159F MED LIST DOCD IN RCRD: CPT | Mod: CPTII,,, | Performed by: PSYCHIATRY & NEUROLOGY

## 2021-07-27 PROCEDURE — 99214 PR OFFICE/OUTPT VISIT, EST, LEVL IV, 30-39 MIN: ICD-10-PCS | Mod: 95,,, | Performed by: PSYCHIATRY & NEUROLOGY

## 2021-07-27 PROCEDURE — 1160F RVW MEDS BY RX/DR IN RCRD: CPT | Mod: CPTII,,, | Performed by: PSYCHIATRY & NEUROLOGY

## 2021-07-27 PROCEDURE — 1159F PR MEDICATION LIST DOCUMENTED IN MEDICAL RECORD: ICD-10-PCS | Mod: CPTII,,, | Performed by: PSYCHIATRY & NEUROLOGY

## 2021-07-27 RX ORDER — DEXMETHYLPHENIDATE HYDROCHLORIDE 10 MG/1
10 TABLET ORAL DAILY
Qty: 30 TABLET | Refills: 0 | Status: SHIPPED | OUTPATIENT
Start: 2021-07-27 | End: 2021-09-14 | Stop reason: SDUPTHER

## 2021-07-27 RX ORDER — ESCITALOPRAM OXALATE 20 MG/1
20 TABLET ORAL DAILY
Qty: 90 TABLET | Refills: 0 | Status: SHIPPED | OUTPATIENT
Start: 2021-07-27 | End: 2021-11-08

## 2021-08-04 ENCOUNTER — PATIENT MESSAGE (OUTPATIENT)
Dept: PSYCHIATRY | Facility: CLINIC | Age: 41
End: 2021-08-04

## 2021-08-12 ENCOUNTER — PATIENT MESSAGE (OUTPATIENT)
Dept: PSYCHIATRY | Facility: CLINIC | Age: 41
End: 2021-08-12

## 2021-09-14 ENCOUNTER — PATIENT MESSAGE (OUTPATIENT)
Dept: PSYCHIATRY | Facility: CLINIC | Age: 41
End: 2021-09-14

## 2021-09-14 RX ORDER — DEXMETHYLPHENIDATE HYDROCHLORIDE 10 MG/1
10 TABLET ORAL DAILY
Qty: 30 TABLET | Refills: 0 | Status: SHIPPED | OUTPATIENT
Start: 2021-09-14 | End: 2022-02-16

## 2021-11-08 ENCOUNTER — PATIENT MESSAGE (OUTPATIENT)
Dept: PSYCHIATRY | Facility: CLINIC | Age: 41
End: 2021-11-08

## 2021-11-18 ENCOUNTER — PATIENT MESSAGE (OUTPATIENT)
Dept: PSYCHIATRY | Facility: CLINIC | Age: 41
End: 2021-11-18

## 2021-11-18 ENCOUNTER — OFFICE VISIT (OUTPATIENT)
Dept: PSYCHIATRY | Facility: CLINIC | Age: 41
End: 2021-11-18

## 2021-11-18 DIAGNOSIS — F33.41 RECURRENT MAJOR DEPRESSIVE DISORDER, IN PARTIAL REMISSION: Primary | ICD-10-CM

## 2021-11-18 DIAGNOSIS — F41.1 GAD (GENERALIZED ANXIETY DISORDER): ICD-10-CM

## 2021-11-18 PROBLEM — F98.8 ATTENTION DEFICIT DISORDER: Status: RESOLVED | Noted: 2020-07-01 | Resolved: 2021-11-18

## 2021-11-18 PROBLEM — F41.9 ANXIETY: Status: RESOLVED | Noted: 2021-05-03 | Resolved: 2021-11-18

## 2021-11-18 PROBLEM — F32.A DEPRESSION: Status: RESOLVED | Noted: 2019-11-12 | Resolved: 2021-11-18

## 2021-11-18 PROCEDURE — 90833 PR PSYCHOTHERAPY W/PATIENT W/E&M, 30 MIN (ADD ON): ICD-10-PCS | Mod: 95,,, | Performed by: PSYCHIATRY & NEUROLOGY

## 2021-11-18 PROCEDURE — 99214 PR OFFICE/OUTPT VISIT, EST, LEVL IV, 30-39 MIN: ICD-10-PCS | Mod: 95,,, | Performed by: PSYCHIATRY & NEUROLOGY

## 2021-11-18 PROCEDURE — 99214 OFFICE O/P EST MOD 30 MIN: CPT | Mod: 95,,, | Performed by: PSYCHIATRY & NEUROLOGY

## 2021-11-18 PROCEDURE — 90833 PSYTX W PT W E/M 30 MIN: CPT | Mod: 95,,, | Performed by: PSYCHIATRY & NEUROLOGY

## 2021-11-19 RX ORDER — ESCITALOPRAM OXALATE 20 MG/1
20 TABLET ORAL DAILY
Qty: 90 TABLET | Refills: 1 | Status: SHIPPED | OUTPATIENT
Start: 2021-11-19 | End: 2022-01-20 | Stop reason: SDUPTHER

## 2022-01-20 ENCOUNTER — PATIENT MESSAGE (OUTPATIENT)
Dept: PSYCHIATRY | Facility: CLINIC | Age: 42
End: 2022-01-20

## 2022-01-20 ENCOUNTER — OFFICE VISIT (OUTPATIENT)
Dept: PSYCHIATRY | Facility: CLINIC | Age: 42
End: 2022-01-20

## 2022-01-20 DIAGNOSIS — F33.41 RECURRENT MAJOR DEPRESSIVE DISORDER, IN PARTIAL REMISSION: Primary | ICD-10-CM

## 2022-01-20 PROCEDURE — 90833 PR PSYCHOTHERAPY W/PATIENT W/E&M, 30 MIN (ADD ON): ICD-10-PCS | Mod: 95,,, | Performed by: PSYCHIATRY & NEUROLOGY

## 2022-01-20 PROCEDURE — 99214 OFFICE O/P EST MOD 30 MIN: CPT | Mod: 95,,, | Performed by: PSYCHIATRY & NEUROLOGY

## 2022-01-20 PROCEDURE — 99214 PR OFFICE/OUTPT VISIT, EST, LEVL IV, 30-39 MIN: ICD-10-PCS | Mod: 95,,, | Performed by: PSYCHIATRY & NEUROLOGY

## 2022-01-20 PROCEDURE — 90833 PSYTX W PT W E/M 30 MIN: CPT | Mod: 95,,, | Performed by: PSYCHIATRY & NEUROLOGY

## 2022-01-20 RX ORDER — ESCITALOPRAM OXALATE 10 MG/1
TABLET ORAL
Qty: 90 TABLET | Refills: 3 | Status: SHIPPED | OUTPATIENT
Start: 2022-01-20 | End: 2022-05-12

## 2022-01-20 RX ORDER — ESCITALOPRAM OXALATE 20 MG/1
20 TABLET ORAL DAILY
Qty: 90 TABLET | Refills: 1 | Status: SHIPPED | OUTPATIENT
Start: 2022-01-20 | End: 2022-05-12

## 2022-01-20 NOTE — PROGRESS NOTES
"Outpatient Psychiatry Follow-Up Visit (MD/NP)    1/20/2022Phone visit. Patient tried to get into video system and was blocked due to wrong equipment at his location. (32", with 5" on meds and 27" for supportive counseling, and update in history and mental status)    Clinical Status of Patient:  Outpatient (Ambulatory)    Chief Complaint:  Wilbur Diop is a 41 y.o. male who presents today for follow-up of depression and anxiety.  Met with patient and no relatives present for interview.      Interval History and Content of Current Session:  Interim Events/Subjective Report/Content of Current Session: Patient has had covid and this began him in a downward spiral with his mood, and was depressed re the isolation. Patient is still seeing his therapist on a weekly basis. Patient has no signs of ranjan or psychosis. Patient has no thoughts of harming himself and is in good self control. Patient stressed his increase in anxiety as well during this interval. Patient is working on  Maintaining a positive attitude. Patient is exercising again to help with his resiliency and ability to cope with his stressors. Patient is working on "not beating myself up so much." We also discussed my FPC. Patient does enjoy watching sports and being with his family. Support was offered to patient re his concerns, and we worked on coping mechanisms.    Psychotherapy:  · Target symptoms: depression, anxiety   · Why chosen therapy is appropriate versus another modality: relevant to diagnosis, patient responds to this modality, evidence based practice  · Outcome monitoring methods: self-report  · Therapeutic intervention type: supportive psychotherapy  · Topics discussed/themes: depression and anxiety  · The patient's response to the intervention is accepting. The patient's progress toward treatment goals is fair.   · Duration of intervention: 32 minutes.    Review of Systems   · PSYCHIATRIC: Pertinant items are noted in the " narrative.    Past Medical, Family and Social History: The patient's past medical, family and social history have been reviewed and updated as appropriate within the electronic medical record - see encounter notes.    Compliance: yes    Side effects: some loss of sex drive    Risk Parameters:  Patient reports no suicidal ideation  Patient reports no homicidal ideation  Patient reports no self-injurious behavior  Patient reports no violent behavior    Exam (detailed: at least 9 elements; comprehensive: all 15 elements)   Constitutional  Vitals:  Most recent vital signs, dated less than 90 days prior to this appointment, were reviewed.   There were no vitals filed for this visit.Patient reports stable vital signs when last checked.     General:  unremarkable, age appropriate, could not assess appearance     Musculoskeletal  Muscle Strength/Tone:  patient reports adequate muscle tone and strength   Gait & Station:  non-ataxic     Psychiatric  Speech:  no latency; no press, spontaneous   Mood & Affect:  anxious, depressed  congruent and appropriate   Thought Process:  normal and logical   Associations:  intact   Thought Content:  normal, no suicidality, no homicidality, delusions, or paranoia   Insight:  has awareness of illness   Judgement: behavior is adequate to circumstances   Orientation:  grossly intact   Memory: intact for content of interview   Language: grossly intact   Attention Span & Concentration:  able to focus   Fund of Knowledge:  intact and appropriate to age and level of education     Assessment and Diagnosis   Status/Progress: Based on the examination today, the patient's problem(s) is/are inadequately controlled.  New problems have not been presented today.   worsening anxiety and depression are complicating management of the primary condition.  The working differential for this patient includes depression and anxiety.     General Impression: Patient has more trouble with his mood and anxiety in the  mornings. Patient remains in good self control despite his stresses at this time. Patient was very worried re his negative thinking and we worked on his changing focus to the things he does successfully. Patient is very motivated to improve his coping skills and agrees to continue with his therapist as well on a regular basis.      ICD-10-CM ICD-9-CM   1. Recurrent major depressive disorder, in partial remission  F33.41 296.35       Intervention/Counseling/Treatment Plan   · Medication Management: The risks and benefits of medication were discussed with the patient. Patient agrees to increase Lexapro to 20 mg q am, and 10mg q pm(30 mg daily).      Return to Clinic: 3 months

## 2022-02-11 ENCOUNTER — HOSPITAL ENCOUNTER (EMERGENCY)
Facility: HOSPITAL | Age: 42
Discharge: HOME | End: 2022-02-12
Attending: EMERGENCY MEDICINE
Payer: OTHER GOVERNMENT

## 2022-02-11 DIAGNOSIS — I47.10 SVT (SUPRAVENTRICULAR TACHYCARDIA): Primary | ICD-10-CM

## 2022-02-11 DIAGNOSIS — R00.2 PALPITATIONS: ICD-10-CM

## 2022-02-11 DIAGNOSIS — R07.9 CHEST PAIN: ICD-10-CM

## 2022-02-11 LAB
ALBUMIN SERPL BCP-MCNC: 4 G/DL (ref 3.5–5.2)
ALP SERPL-CCNC: 67 U/L (ref 55–135)
ALT SERPL W/O P-5'-P-CCNC: 45 U/L (ref 10–44)
ANION GAP SERPL CALC-SCNC: 15 MMOL/L (ref 8–16)
AST SERPL-CCNC: 33 U/L (ref 10–40)
BASOPHILS # BLD AUTO: 0.06 K/UL (ref 0–0.2)
BASOPHILS NFR BLD: 0.4 % (ref 0–1.9)
BILIRUB SERPL-MCNC: 0.6 MG/DL (ref 0.1–1)
BNP SERPL-MCNC: 87 PG/ML (ref 0–99)
BUN SERPL-MCNC: 16 MG/DL (ref 6–20)
CALCIUM SERPL-MCNC: 9.5 MG/DL (ref 8.7–10.5)
CHLORIDE SERPL-SCNC: 104 MMOL/L (ref 95–110)
CO2 SERPL-SCNC: 22 MMOL/L (ref 23–29)
CREAT SERPL-MCNC: 0.9 MG/DL (ref 0.5–1.4)
CTP QC/QA: YES
DIFFERENTIAL METHOD: ABNORMAL
EOSINOPHIL # BLD AUTO: 0.4 K/UL (ref 0–0.5)
EOSINOPHIL NFR BLD: 2.4 % (ref 0–8)
ERYTHROCYTE [DISTWIDTH] IN BLOOD BY AUTOMATED COUNT: 12.4 % (ref 11.5–14.5)
EST. GFR  (AFRICAN AMERICAN): >60 ML/MIN/1.73 M^2
EST. GFR  (NON AFRICAN AMERICAN): >60 ML/MIN/1.73 M^2
GLUCOSE SERPL-MCNC: 164 MG/DL (ref 70–110)
HCT VFR BLD AUTO: 47.7 % (ref 40–54)
HGB BLD-MCNC: 15.9 G/DL (ref 14–18)
IMM GRANULOCYTES # BLD AUTO: 0.05 K/UL (ref 0–0.04)
IMM GRANULOCYTES NFR BLD AUTO: 0.3 % (ref 0–0.5)
LYMPHOCYTES # BLD AUTO: 4.6 K/UL (ref 1–4.8)
LYMPHOCYTES NFR BLD: 31.2 % (ref 18–48)
MAGNESIUM SERPL-MCNC: 2 MG/DL (ref 1.6–2.6)
MCH RBC QN AUTO: 29.8 PG (ref 27–31)
MCHC RBC AUTO-ENTMCNC: 33.3 G/DL (ref 32–36)
MCV RBC AUTO: 89 FL (ref 82–98)
MONOCYTES # BLD AUTO: 1.5 K/UL (ref 0.3–1)
MONOCYTES NFR BLD: 10.4 % (ref 4–15)
NEUTROPHILS # BLD AUTO: 8.2 K/UL (ref 1.8–7.7)
NEUTROPHILS NFR BLD: 55.3 % (ref 38–73)
NRBC BLD-RTO: 0 /100 WBC
PLATELET # BLD AUTO: 336 K/UL (ref 150–450)
PMV BLD AUTO: 10 FL (ref 9.2–12.9)
POTASSIUM SERPL-SCNC: 3.5 MMOL/L (ref 3.5–5.1)
PROT SERPL-MCNC: 7.6 G/DL (ref 6–8.4)
RBC # BLD AUTO: 5.34 M/UL (ref 4.6–6.2)
SARS-COV-2 RDRP RESP QL NAA+PROBE: NEGATIVE
SODIUM SERPL-SCNC: 141 MMOL/L (ref 136–145)
TROPONIN I SERPL DL<=0.01 NG/ML-MCNC: 0.01 NG/ML (ref 0–0.03)
TSH SERPL DL<=0.005 MIU/L-ACNC: 1.82 UIU/ML (ref 0.4–4)
WBC # BLD AUTO: 14.8 K/UL (ref 3.9–12.7)

## 2022-02-11 PROCEDURE — 86803 HEPATITIS C AB TEST: CPT | Performed by: EMERGENCY MEDICINE

## 2022-02-11 PROCEDURE — 93010 ELECTROCARDIOGRAM REPORT: CPT | Mod: ,,, | Performed by: INTERNAL MEDICINE

## 2022-02-11 PROCEDURE — 93005 ELECTROCARDIOGRAM TRACING: CPT

## 2022-02-11 PROCEDURE — 85025 COMPLETE CBC W/AUTO DIFF WBC: CPT | Performed by: EMERGENCY MEDICINE

## 2022-02-11 PROCEDURE — 84443 ASSAY THYROID STIM HORMONE: CPT | Performed by: EMERGENCY MEDICINE

## 2022-02-11 PROCEDURE — 93010 EKG 12-LEAD: ICD-10-PCS | Mod: ,,, | Performed by: INTERNAL MEDICINE

## 2022-02-11 PROCEDURE — 63600175 PHARM REV CODE 636 W HCPCS

## 2022-02-11 PROCEDURE — 83880 ASSAY OF NATRIURETIC PEPTIDE: CPT | Performed by: EMERGENCY MEDICINE

## 2022-02-11 PROCEDURE — 99285 PR EMERGENCY DEPT VISIT,LEVEL V: ICD-10-PCS | Mod: 25,CS,, | Performed by: EMERGENCY MEDICINE

## 2022-02-11 PROCEDURE — 96375 TX/PRO/DX INJ NEW DRUG ADDON: CPT

## 2022-02-11 PROCEDURE — 96361 HYDRATE IV INFUSION ADD-ON: CPT

## 2022-02-11 PROCEDURE — 83735 ASSAY OF MAGNESIUM: CPT | Performed by: EMERGENCY MEDICINE

## 2022-02-11 PROCEDURE — 92960 CARDIOVERSION ELECTRIC EXT: CPT | Mod: ,,, | Performed by: EMERGENCY MEDICINE

## 2022-02-11 PROCEDURE — 84484 ASSAY OF TROPONIN QUANT: CPT | Performed by: EMERGENCY MEDICINE

## 2022-02-11 PROCEDURE — 96374 THER/PROPH/DIAG INJ IV PUSH: CPT

## 2022-02-11 PROCEDURE — 87389 HIV-1 AG W/HIV-1&-2 AB AG IA: CPT | Performed by: EMERGENCY MEDICINE

## 2022-02-11 PROCEDURE — 93010 ELECTROCARDIOGRAM REPORT: CPT | Mod: 76,,, | Performed by: INTERNAL MEDICINE

## 2022-02-11 PROCEDURE — 99285 EMERGENCY DEPT VISIT HI MDM: CPT | Mod: 25,CS,, | Performed by: EMERGENCY MEDICINE

## 2022-02-11 PROCEDURE — 92960 PR CARDIOVERSION, ELECTIVE;EXTERN: ICD-10-PCS | Mod: ,,, | Performed by: EMERGENCY MEDICINE

## 2022-02-11 PROCEDURE — 99285 EMERGENCY DEPT VISIT HI MDM: CPT | Mod: 25

## 2022-02-11 PROCEDURE — 80053 COMPREHEN METABOLIC PANEL: CPT | Performed by: EMERGENCY MEDICINE

## 2022-02-11 PROCEDURE — 25000003 PHARM REV CODE 250: Performed by: EMERGENCY MEDICINE

## 2022-02-11 PROCEDURE — 25000003 PHARM REV CODE 250

## 2022-02-11 PROCEDURE — U0002 COVID-19 LAB TEST NON-CDC: HCPCS | Performed by: EMERGENCY MEDICINE

## 2022-02-11 RX ORDER — ADENOSINE 3 MG/ML
12 INJECTION, SOLUTION INTRAVENOUS
Status: COMPLETED | OUTPATIENT
Start: 2022-02-11 | End: 2022-02-11

## 2022-02-11 RX ORDER — ADENOSINE 3 MG/ML
INJECTION, SOLUTION INTRAVENOUS
Status: COMPLETED
Start: 2022-02-11 | End: 2022-02-11

## 2022-02-11 RX ORDER — METOPROLOL TARTRATE 1 MG/ML
INJECTION, SOLUTION INTRAVENOUS
Status: COMPLETED
Start: 2022-02-11 | End: 2022-02-11

## 2022-02-11 RX ORDER — METOPROLOL TARTRATE 1 MG/ML
5 INJECTION, SOLUTION INTRAVENOUS
Status: COMPLETED | OUTPATIENT
Start: 2022-02-11 | End: 2022-02-11

## 2022-02-11 RX ADMIN — METOROPROLOL TARTRATE 5 MG: 5 INJECTION, SOLUTION INTRAVENOUS at 10:02

## 2022-02-11 RX ADMIN — ADENOSINE 12 MG: 3 INJECTION, SOLUTION INTRAVENOUS at 10:02

## 2022-02-11 RX ADMIN — METOPROLOL TARTRATE 5 MG: 1 INJECTION, SOLUTION INTRAVENOUS at 10:02

## 2022-02-11 RX ADMIN — SODIUM CHLORIDE 1000 ML: 0.9 INJECTION, SOLUTION INTRAVENOUS at 10:02

## 2022-02-12 VITALS
SYSTOLIC BLOOD PRESSURE: 106 MMHG | TEMPERATURE: 99 F | BODY MASS INDEX: 34.46 KG/M2 | WEIGHT: 230 LBS | OXYGEN SATURATION: 96 % | HEART RATE: 64 BPM | RESPIRATION RATE: 20 BRPM | DIASTOLIC BLOOD PRESSURE: 55 MMHG

## 2022-02-12 NOTE — ED TRIAGE NOTES
Pt arrived c/o chest pain and palpitations x3 days w/ associated abdominal pain and nausea. HR in 160s. Pt found to be in SVT.

## 2022-02-12 NOTE — ED PROVIDER NOTES
Encounter Date: 2/11/2022       History     Chief Complaint   Patient presents with    Chest Pain     Pt c/o palpitations for the past 3 days with associated abdominal pain. Pt  in triage.      HPI   41-year-old male past medical history ADHD, anxiety, depression, who comes in today for palpitations.  He said he has felt heart racing, palpitations, over the past 3 days off and on associated with anxiety.  He states he is on an SSRI, not currently taking methylphenidate.  He did take a THC/CBD gummy prior to arrival.  He denies headache vision changes, nausea,, shortness of breath, abdominal pain, change in urine habits, recent sick contacts or travel.  He denies alcohol use, other drug use, nonsmoker,  Review of patient's allergies indicates:  No Known Allergies  Past Medical History:   Diagnosis Date    ADHD (attention deficit hyperactivity disorder)     Allergy     Anxiety     Depression     History of psychiatric hospitalization     2002 for depression    Hx of psychiatric care     Psychiatric problem     Therapy      Past Surgical History:   Procedure Laterality Date    APPENDECTOMY      HERNIA REPAIR       Family History   Problem Relation Age of Onset    Depression Mother     Anxiety disorder Mother     No Known Problems Father     No Known Problems Sister     No Known Problems Brother     No Known Problems Maternal Aunt     No Known Problems Maternal Uncle     No Known Problems Paternal Aunt     No Known Problems Paternal Uncle     No Known Problems Maternal Grandmother     Cancer Maternal Grandfather     No Known Problems Paternal Grandmother     No Known Problems Paternal Grandfather     Amblyopia Neg Hx     Blindness Neg Hx     Cataracts Neg Hx     Diabetes Neg Hx     Glaucoma Neg Hx     Hypertension Neg Hx     Macular degeneration Neg Hx     Retinal detachment Neg Hx     Strabismus Neg Hx     Stroke Neg Hx     Thyroid disease Neg Hx      Social History     Tobacco  Use    Smoking status: Former Smoker     Quit date: 10/22/2004     Years since quittin.3    Smokeless tobacco: Never Used   Substance Use Topics    Alcohol use: Not Currently     Alcohol/week: 1.7 standard drinks     Types: 2 Standard drinks or equivalent per week     Comment: once a month; socially    Drug use: No     Review of Systems   Constitutional: Negative for fever.   HENT: Negative for sinus pressure, sinus pain and sore throat.    Eyes: Negative for photophobia, redness and visual disturbance.   Respiratory: Negative for shortness of breath, wheezing and stridor.    Cardiovascular: Positive for palpitations. Negative for chest pain and leg swelling.   Gastrointestinal: Negative for abdominal distention, abdominal pain, anal bleeding and nausea.   Endocrine: Negative for polydipsia, polyphagia and polyuria.   Genitourinary: Negative for dysuria.   Musculoskeletal: Negative for back pain, gait problem and joint swelling.   Skin: Negative for pallor and rash.   Neurological: Negative for syncope, facial asymmetry, speech difficulty, weakness and light-headedness.   Hematological: Does not bruise/bleed easily.       Physical Exam     Initial Vitals [22 2223]   BP Pulse Resp Temp SpO2   (!) 155/88 (!) 162 18 98.6 °F (37 °C) 98 %      MAP       --         Physical Exam    Constitutional:   Anxious appearing gentleman, blood pressure 155/88, tachycardic 162, afebrile, satting at 98% room air, no signs respiratory distress, respiratory rate 18   HENT:   Head: Normocephalic and atraumatic.   Eyes: EOM are normal. Pupils are equal, round, and reactive to light.   Neck: Neck supple.   Normal range of motion.  Cardiovascular: Regular rhythm. Exam reveals no gallop and no friction rub.    No murmur heard.  Tachycardic   Pulmonary/Chest: Breath sounds normal. No respiratory distress. He has no wheezes. He has no rhonchi. He has no rales. He exhibits no tenderness.   Abdominal: Abdomen is soft. Bowel  sounds are normal. He exhibits no distension and no mass. There is no abdominal tenderness. There is no rebound and no guarding.   Musculoskeletal:         General: No tenderness or edema. Normal range of motion.      Cervical back: Normal range of motion and neck supple.     Neurological: He is alert and oriented to person, place, and time. He displays normal reflexes. No cranial nerve deficit or sensory deficit. GCS score is 15. GCS eye subscore is 4. GCS verbal subscore is 5. GCS motor subscore is 6.   Skin: Skin is warm. Capillary refill takes less than 2 seconds. No rash and no abscess noted. No erythema. No pallor.         ED Course   Cardioversion    Date/Time: 2/11/2022 10:30 PM  Location procedure was performed: [facility] EMERGENCY DEPARTMENT  Performed by: Gillian Bunch MD  Authorized by: Gillian Bunch MD   Consent Done: Yes  Consent: Verbal consent obtained.  Risks and benefits: risks, benefits and alternatives were discussed  Consent given by: patient  Required items: required blood products, implants, devices, and special equipment available  Cardioversion basis: emergent  Pre-procedure rhythm: supraventricular tachycardia  Patient position: patient was placed in a supine position  Chest area: chest area exposed  Electrodes: pads  Electrodes placed: anterior-posterior  Number of attempts: 1  Attempt 1 outcome: conversion to normal sinus rhythm  Post-procedure rhythm: normal sinus rhythm  Complications: no complications  Patient tolerance: Patient tolerated the procedure well with no immediate complications  Technical procedures used: Chemical cardioversion with adenosine  Complications: No  Estimated blood loss (mL): 0  Specimens: No  Implants: No        Labs Reviewed   CBC W/ AUTO DIFFERENTIAL   COMPREHENSIVE METABOLIC PANEL   TROPONIN I   B-TYPE NATRIURETIC PEPTIDE   TSH   MAGNESIUM   HIV 1 / 2 ANTIBODY   HEPATITIS C ANTIBODY   SARS-COV-2 RDRP GENE          Imaging Results    None           Medications   sodium chloride 0.9% bolus 1,000 mL (has no administration in time range)   adenosine injection 12 mg (has no administration in time range)   metoprolol injection 5 mg (has no administration in time range)     Medical Decision Making:   Initial Assessment:   41-year-old male, history of ADHD, anxiety, depression who came in today with palpitations, EKG is consistent with SVT, tried modified vagal maneuvers, the CT did not break, given adenosine 12 mg, converted to sinus rhythm, then had intermittent ectopy with a CT, given 5 mg of metoprolol, hemoglobin stable, no electrolyte abnormalities, troponin negative, repeat EKG does show any ST elevations, depressions QRS within normal limits, no QT prolongation, creatinine at baseline, TSH within normal limits, Mag within normal limits, received 1 L of fluids.  Continued to be asymptomatic during hospital stay, given follow-up with electrophysiology, given strict return precautions, able did demonstrate understanding teacher, stable discharge.  Differential Diagnosis:   SVT, electrolyte abnormality, anemia, BHARGAV, hyperthyroid            Attending Attestation:   Physician Attestation Statement for Resident:  As the supervising MD   Physician Attestation Statement: I have personally seen and examined this patient.   I agree with the above history. -:   As the supervising MD I agree with the above PE.    As the supervising MD I agree with the above treatment, course, plan, and disposition.   -:   41-year-old presenting to ER with complaint of palpitations, found to have SVT.  Ed successfully converted with 12 mg of adenosine.  Remains stable, well-appearing.  Labs unremarkable.  Plan follow-up in electrophysiology Clinic.  Discharged home in stable condition.  I was personally present during the entire procedure.  I have reviewed and agree with the residents interpretation of the following: lab data, x-rays and EKG.  I have reviewed the following: old records  at this facility.        Attending Critical Care:   Critical Care Times:   Direct Patient Care (initial evaluation, reassessments, and time considering the case)................................................................15 minutes.   Additional History from reviewing old medical records or taking additional history from the family, EMS, PCP, etc.......................3 minutes.   Ordering, Reviewing, and Interpreting Diagnostic Studies...............................................................................................................4 minutes.   Documentation..................................................................................................................................................................................3 minutes.   ==============================================================  · Total Critical Care Time - exclusive of procedural time: 25 minutes.  ==============================================================  Critical care was necessary to treat or prevent imminent or life-threatening deterioration of the following conditions: cardiac arrhythmia.   Critical care was time spent personally by me on the following activities: re-evaluation of patient's conition, interpretation of cardiac measurements, obtaining history from patient or relative, examination of patient, review of old charts, ordering lab, x-rays, and/or EKG, development of treatment plan with patient or relative, ordering and performing treatments and interventions and evaluation of patient's response to treatment.   Critical Care Condition: potentially life-threatening           ED Course as of 02/12/22 0003   Fri Feb 11, 2022   2301 EKG shows tachycardic, regular, narrow complex, consistent with SVT, no elevations, depressions, and no obvious STEMI, [JG]   2348 Troponin I: 0.007 [JG]   2348 WBC(!): 14.80 [JG]   2348 Hemoglobin: 15.9 [JG]   2348 BNP: 87 [JG]   Sat Feb 12, 2022   0000 Sodium: 141 [JG]    0000 Potassium: 3.5 [JG]   0000 Chloride: 104 [JG]   0000 CO2(!): 22 [JG]   0000 Creatinine: 0.9 [JG]   0001 TSH: 1.821 [JG]   0001 Magnesium: 2.0 [JG]      ED Course User Index  [JG] Morgan Ordonez MD             Clinical Impression:   Final diagnoses:  [R07.9] Chest pain  [I47.1] SVT (supraventricular tachycardia) (Primary)                 Morgan Ordonez MD  Resident  02/12/22 0005       Gillian Bunch MD  02/12/22 0009

## 2022-02-15 ENCOUNTER — TELEPHONE (OUTPATIENT)
Dept: ELECTROPHYSIOLOGY | Facility: CLINIC | Age: 42
End: 2022-02-15
Payer: OTHER GOVERNMENT

## 2022-02-15 DIAGNOSIS — I47.10 SVT (SUPRAVENTRICULAR TACHYCARDIA): Primary | ICD-10-CM

## 2022-02-15 LAB
HCV AB SERPL QL IA: NEGATIVE
HIV 1+2 AB+HIV1 P24 AG SERPL QL IA: NEGATIVE

## 2022-02-15 NOTE — TELEPHONE ENCOUNTER
Spoke w/ pt who said he has not seen any outside cardiologists and does not have a device implant. Pt confirmed appt w/ DM.

## 2022-02-16 ENCOUNTER — OFFICE VISIT (OUTPATIENT)
Dept: ELECTROPHYSIOLOGY | Facility: CLINIC | Age: 42
End: 2022-02-16

## 2022-02-16 ENCOUNTER — HOSPITAL ENCOUNTER (OUTPATIENT)
Dept: CARDIOLOGY | Facility: CLINIC | Age: 42
Discharge: HOME | End: 2022-02-16
Payer: OTHER GOVERNMENT

## 2022-02-16 VITALS
HEIGHT: 69 IN | SYSTOLIC BLOOD PRESSURE: 126 MMHG | HEART RATE: 63 BPM | WEIGHT: 233.69 LBS | BODY MASS INDEX: 34.61 KG/M2 | DIASTOLIC BLOOD PRESSURE: 82 MMHG

## 2022-02-16 DIAGNOSIS — I47.10 SVT (SUPRAVENTRICULAR TACHYCARDIA): ICD-10-CM

## 2022-02-16 DIAGNOSIS — I48.3 TYPICAL ATRIAL FLUTTER: ICD-10-CM

## 2022-02-16 DIAGNOSIS — I47.10 PSVT (PAROXYSMAL SUPRAVENTRICULAR TACHYCARDIA): ICD-10-CM

## 2022-02-16 DIAGNOSIS — I47.10 PSVT (PAROXYSMAL SUPRAVENTRICULAR TACHYCARDIA): Primary | ICD-10-CM

## 2022-02-16 PROCEDURE — 93010 ELECTROCARDIOGRAM REPORT: CPT | Mod: S$PBB,,, | Performed by: INTERNAL MEDICINE

## 2022-02-16 PROCEDURE — 99245 PR OFFICE CONSULTATION,LEVEL V: ICD-10-PCS | Mod: S$PBB,,, | Performed by: INTERNAL MEDICINE

## 2022-02-16 PROCEDURE — 99999 PR PBB SHADOW E&M-EST. PATIENT-LVL III: ICD-10-PCS | Mod: PBBFAC,,, | Performed by: INTERNAL MEDICINE

## 2022-02-16 PROCEDURE — 99999 PR PBB SHADOW E&M-EST. PATIENT-LVL III: CPT | Mod: PBBFAC,,, | Performed by: INTERNAL MEDICINE

## 2022-02-16 PROCEDURE — 93005 ELECTROCARDIOGRAM TRACING: CPT | Mod: PBBFAC | Performed by: INTERNAL MEDICINE

## 2022-02-16 PROCEDURE — 99213 OFFICE O/P EST LOW 20 MIN: CPT | Mod: PBBFAC | Performed by: INTERNAL MEDICINE

## 2022-02-16 PROCEDURE — 99245 OFF/OP CONSLTJ NEW/EST HI 55: CPT | Mod: S$PBB,,, | Performed by: INTERNAL MEDICINE

## 2022-02-16 PROCEDURE — 93010 RHYTHM STRIP: ICD-10-PCS | Mod: S$PBB,,, | Performed by: INTERNAL MEDICINE

## 2022-02-16 RX ORDER — VERAPAMIL HYDROCHLORIDE 120 MG/1
120 CAPSULE, EXTENDED RELEASE ORAL DAILY
Qty: 120 CAPSULE | Refills: 3 | Status: SHIPPED | OUTPATIENT
Start: 2022-02-16 | End: 2022-02-24 | Stop reason: SDUPTHER

## 2022-02-16 NOTE — PROGRESS NOTES
Subjective:    Patient ID:  Wilbur Diop is a 42 y.o. male who presents for evaluation of SVT/AFL    HPI   42 y.o. M  anxiety, depression; on meds  AFL/PSVT    Had racing heart rate for >2 days. Went to ER 2/11/22; diagnosed with SVT but ECG appears c/w typical AFL with 2:1 conduction. Reportedly CSM had no effect, and arrhythmia terminated following 12 mg adenosine. No ECG of termination.  Felt well since.  Never happened before nor since.    My interpretation of today's ECG is NSR    Review of Systems   Constitutional: Negative. Negative for malaise/fatigue.   HENT: Negative.  Negative for ear pain and tinnitus.    Eyes: Negative for blurred vision.   Cardiovascular: Negative.  Negative for chest pain, dyspnea on exertion, near-syncope, palpitations and syncope.   Respiratory: Negative.  Negative for shortness of breath.    Endocrine: Negative.  Negative for polyuria.   Hematologic/Lymphatic: Does not bruise/bleed easily.   Skin: Negative.  Negative for rash.   Musculoskeletal: Negative.  Negative for joint pain and muscle weakness.   Gastrointestinal: Negative.  Negative for abdominal pain and change in bowel habit.   Genitourinary: Negative for frequency.   Neurological: Negative.  Negative for dizziness and weakness.   Psychiatric/Behavioral: Positive for depression. The patient is nervous/anxious.    Allergic/Immunologic: Negative for environmental allergies.        Objective:    Physical Exam  Vitals and nursing note reviewed.   Constitutional:       Appearance: He is well-developed.   HENT:      Head: Normocephalic and atraumatic.   Eyes:      General: Lids are normal. No scleral icterus.     Conjunctiva/sclera: Conjunctivae normal.   Neck:      Thyroid: No thyromegaly.      Vascular: No JVD.      Trachea: No tracheal deviation.   Cardiovascular:      Rate and Rhythm: Normal rate and regular rhythm.      Pulses:           Radial pulses are 2+ on the right side and 2+ on the left side.   Pulmonary:       "Effort: Pulmonary effort is normal. No tachypnea, accessory muscle usage or respiratory distress.      Breath sounds: Normal breath sounds. No wheezing.   Abdominal:      General: There is no distension.   Musculoskeletal:         General: Normal range of motion.      Cervical back: Normal range of motion.   Skin:     General: Skin is warm and dry.   Neurological:      Mental Status: He is alert and oriented to person, place, and time.      Motor: No abnormal muscle tone.      Deep Tendon Reflexes: Reflexes are normal and symmetric.   Psychiatric:         Behavior: Behavior normal.           Assessment:       1. SVT (supraventricular tachycardia)    2. Typical atrial flutter    3. PSVT (paroxysmal supraventricular tachycardia)         Plan:       Discussed AFL and its basic pathophysiology, including its health implications and treatment options (rate vs. rhythm control, meds vs. procedural/device treatment) as appropriate for the patient.  ECG is most c/w typical AFL, but "termination" following adenosine is not characteristic of AFL. Another possibility would be a low septal AT (e.g., at the CS os).  Discussed options: nothing vs. meds vs. ablation.    I spent about a half hour discussing the nature of EP study and ablation, including possible transseptal puncture. We discused risks and benefits at length. Our discussion included, but was not limited to the risk of death, infection, bleeding, stroke, MI, cardiac perforation, embolism, cardiac tamponade, skin burns, and other organic injury including the possibility for need for surgery or pacemaker implantation.  I discussed with patient risks, indications, benefits, and alternatives of the planned procedure. All questions were answered. Patient understands and wishes to proceed.    Plan EPS for attempted induction of PSVT (chiefly AT). If SVT inducible, will likely target it for ablation and then also ablate the CTI for typical flutter. If noninducible, then just " ablate flutter.    I discussed with patient risks, indications, benefits, and alternatives of the planned procedure. All questions were answered. Patient understands and wishes to proceed.  Verapamil until ablation; hold it for 3 days preablation.    Needs financial coordination (presently uninsured); CHIQUITA Woodruff RN and FRANKLIN Harvey to facilitate.

## 2022-02-17 ENCOUNTER — PATIENT MESSAGE (OUTPATIENT)
Dept: ELECTROPHYSIOLOGY | Facility: CLINIC | Age: 42
End: 2022-02-17
Payer: OTHER GOVERNMENT

## 2022-02-17 DIAGNOSIS — I48.3 TYPICAL ATRIAL FLUTTER: ICD-10-CM

## 2022-02-17 DIAGNOSIS — I47.10 PSVT (PAROXYSMAL SUPRAVENTRICULAR TACHYCARDIA): Primary | ICD-10-CM

## 2022-02-23 ENCOUNTER — HOSPITAL ENCOUNTER (EMERGENCY)
Facility: HOSPITAL | Age: 42
Discharge: HOME | End: 2022-02-23
Attending: EMERGENCY MEDICINE

## 2022-02-23 ENCOUNTER — NURSE TRIAGE (OUTPATIENT)
Dept: ADMINISTRATIVE | Facility: CLINIC | Age: 42
End: 2022-02-23
Payer: OTHER GOVERNMENT

## 2022-02-23 VITALS
OXYGEN SATURATION: 95 % | HEART RATE: 63 BPM | TEMPERATURE: 99 F | SYSTOLIC BLOOD PRESSURE: 121 MMHG | RESPIRATION RATE: 14 BRPM | DIASTOLIC BLOOD PRESSURE: 64 MMHG

## 2022-02-23 DIAGNOSIS — I48.92 ATRIAL FLUTTER WITH RAPID VENTRICULAR RESPONSE: Primary | ICD-10-CM

## 2022-02-23 DIAGNOSIS — R00.2 PALPITATIONS: ICD-10-CM

## 2022-02-23 DIAGNOSIS — R00.0 TACHYCARDIA: ICD-10-CM

## 2022-02-23 LAB
ALBUMIN SERPL BCP-MCNC: 4 G/DL (ref 3.5–5.2)
ALP SERPL-CCNC: 67 U/L (ref 55–135)
ALT SERPL W/O P-5'-P-CCNC: 30 U/L (ref 10–44)
ANION GAP SERPL CALC-SCNC: 13 MMOL/L (ref 8–16)
AST SERPL-CCNC: 23 U/L (ref 10–40)
BASOPHILS # BLD AUTO: 0.08 K/UL (ref 0–0.2)
BASOPHILS NFR BLD: 0.7 % (ref 0–1.9)
BILIRUB SERPL-MCNC: 0.4 MG/DL (ref 0.1–1)
BUN SERPL-MCNC: 21 MG/DL (ref 6–20)
CALCIUM SERPL-MCNC: 9.5 MG/DL (ref 8.7–10.5)
CHLORIDE SERPL-SCNC: 106 MMOL/L (ref 95–110)
CO2 SERPL-SCNC: 21 MMOL/L (ref 23–29)
CREAT SERPL-MCNC: 0.9 MG/DL (ref 0.5–1.4)
DIFFERENTIAL METHOD: ABNORMAL
EOSINOPHIL # BLD AUTO: 0.3 K/UL (ref 0–0.5)
EOSINOPHIL NFR BLD: 2.7 % (ref 0–8)
ERYTHROCYTE [DISTWIDTH] IN BLOOD BY AUTOMATED COUNT: 12.4 % (ref 11.5–14.5)
EST. GFR  (AFRICAN AMERICAN): >60 ML/MIN/1.73 M^2
EST. GFR  (NON AFRICAN AMERICAN): >60 ML/MIN/1.73 M^2
GLUCOSE SERPL-MCNC: 82 MG/DL (ref 70–110)
HCT VFR BLD AUTO: 48.1 % (ref 40–54)
HGB BLD-MCNC: 15.7 G/DL (ref 14–18)
IMM GRANULOCYTES # BLD AUTO: 0.03 K/UL (ref 0–0.04)
IMM GRANULOCYTES NFR BLD AUTO: 0.3 % (ref 0–0.5)
LYMPHOCYTES # BLD AUTO: 3.9 K/UL (ref 1–4.8)
LYMPHOCYTES NFR BLD: 34.2 % (ref 18–48)
MCH RBC QN AUTO: 29.8 PG (ref 27–31)
MCHC RBC AUTO-ENTMCNC: 32.6 G/DL (ref 32–36)
MCV RBC AUTO: 91 FL (ref 82–98)
MONOCYTES # BLD AUTO: 1.4 K/UL (ref 0.3–1)
MONOCYTES NFR BLD: 12.2 % (ref 4–15)
NEUTROPHILS # BLD AUTO: 5.8 K/UL (ref 1.8–7.7)
NEUTROPHILS NFR BLD: 49.9 % (ref 38–73)
NRBC BLD-RTO: 0 /100 WBC
PLATELET # BLD AUTO: 302 K/UL (ref 150–450)
PMV BLD AUTO: 10.2 FL (ref 9.2–12.9)
POTASSIUM SERPL-SCNC: 3.8 MMOL/L (ref 3.5–5.1)
PROT SERPL-MCNC: 7.5 G/DL (ref 6–8.4)
RBC # BLD AUTO: 5.26 M/UL (ref 4.6–6.2)
SODIUM SERPL-SCNC: 140 MMOL/L (ref 136–145)
WBC # BLD AUTO: 11.53 K/UL (ref 3.9–12.7)

## 2022-02-23 PROCEDURE — 96361 HYDRATE IV INFUSION ADD-ON: CPT

## 2022-02-23 PROCEDURE — 99291 PR CRITICAL CARE, E/M 30-74 MINUTES: ICD-10-PCS | Mod: ,,, | Performed by: EMERGENCY MEDICINE

## 2022-02-23 PROCEDURE — 93010 EKG 12-LEAD: ICD-10-PCS | Mod: ,,, | Performed by: INTERNAL MEDICINE

## 2022-02-23 PROCEDURE — 25000003 PHARM REV CODE 250: Performed by: EMERGENCY MEDICINE

## 2022-02-23 PROCEDURE — 93010 ELECTROCARDIOGRAM REPORT: CPT | Mod: ,,, | Performed by: INTERNAL MEDICINE

## 2022-02-23 PROCEDURE — 93005 ELECTROCARDIOGRAM TRACING: CPT

## 2022-02-23 PROCEDURE — 85025 COMPLETE CBC W/AUTO DIFF WBC: CPT | Performed by: EMERGENCY MEDICINE

## 2022-02-23 PROCEDURE — 96374 THER/PROPH/DIAG INJ IV PUSH: CPT

## 2022-02-23 PROCEDURE — 80053 COMPREHEN METABOLIC PANEL: CPT | Performed by: EMERGENCY MEDICINE

## 2022-02-23 PROCEDURE — 99291 CRITICAL CARE FIRST HOUR: CPT | Mod: 25

## 2022-02-23 PROCEDURE — 99291 CRITICAL CARE FIRST HOUR: CPT | Mod: ,,, | Performed by: EMERGENCY MEDICINE

## 2022-02-23 RX ORDER — DILTIAZEM HYDROCHLORIDE 5 MG/ML
15 INJECTION INTRAVENOUS
Status: COMPLETED | OUTPATIENT
Start: 2022-02-23 | End: 2022-02-23

## 2022-02-23 RX ADMIN — DILTIAZEM HYDROCHLORIDE 15 MG: 5 INJECTION INTRAVENOUS at 08:02

## 2022-02-23 RX ADMIN — SODIUM CHLORIDE 1000 ML: 0.9 INJECTION, SOLUTION INTRAVENOUS at 08:02

## 2022-02-24 ENCOUNTER — TELEPHONE (OUTPATIENT)
Dept: ELECTROPHYSIOLOGY | Facility: CLINIC | Age: 42
End: 2022-02-24
Payer: OTHER GOVERNMENT

## 2022-02-24 DIAGNOSIS — I48.3 TYPICAL ATRIAL FLUTTER: Primary | ICD-10-CM

## 2022-02-24 NOTE — ED PROVIDER NOTES
Encounter Date: 2/23/2022       History     Chief Complaint   Patient presents with    Irregular Heart Beat     HR of 120-150s at home. Scheduled for ablation on 3/16. Pt felt HR shoot up around 1500.      Wilbur Diop is a 42 M hx of anxiety, depression, ADHD, recent SVT verses a flutter presenting today with palpitations.  Symptoms started around 1:00 p.m. while he was at lunch.  Said that he felt sweaty admitted started having palpitations.  That eventually improved but then returned shortly after.  It has been intermittent throughout the day until prior to arrival where he became constant.  He put on his wife's apple watch which showed a heart rate in the 150s.  He denies lightheadedness/dizziness.  Diaphoresis has improved.  No chest discomfort.  No recent infectious symptoms.    Patient was seen in the ED on 02/16 for similar complaint, follow-up with Dr. Daugherty and is scheduled for ablation on 03/16.  He has been compliant with for verapamil.         Review of patient's allergies indicates:  No Known Allergies  Past Medical History:   Diagnosis Date    ADHD (attention deficit hyperactivity disorder)     Allergy     Anxiety     Atrial flutter     Depression     History of psychiatric hospitalization     2002 for depression    Hx of psychiatric care     Psychiatric problem     Supraventricular tachycardia     Therapy      Past Surgical History:   Procedure Laterality Date    APPENDECTOMY      HERNIA REPAIR       Family History   Problem Relation Age of Onset    Depression Mother     Anxiety disorder Mother     No Known Problems Father     No Known Problems Sister     No Known Problems Brother     No Known Problems Maternal Aunt     No Known Problems Maternal Uncle     No Known Problems Paternal Aunt     No Known Problems Paternal Uncle     No Known Problems Maternal Grandmother     Cancer Maternal Grandfather     No Known Problems Paternal Grandmother     No Known Problems Paternal  Grandfather     Amblyopia Neg Hx     Blindness Neg Hx     Cataracts Neg Hx     Diabetes Neg Hx     Glaucoma Neg Hx     Hypertension Neg Hx     Macular degeneration Neg Hx     Retinal detachment Neg Hx     Strabismus Neg Hx     Stroke Neg Hx     Thyroid disease Neg Hx      Social History     Tobacco Use    Smoking status: Former Smoker     Quit date: 10/22/2004     Years since quittin.3    Smokeless tobacco: Never Used   Substance Use Topics    Alcohol use: Yes     Alcohol/week: 1.7 standard drinks     Types: 2 Standard drinks or equivalent per week     Comment: once a month; socially    Drug use: No     Review of Systems   Constitutional: Negative for chills and fever.   HENT: Negative for sore throat.    Respiratory: Positive for shortness of breath. Negative for cough.    Cardiovascular: Positive for chest pain and palpitations.   Gastrointestinal: Negative for abdominal pain, nausea and vomiting.   Genitourinary: Negative for dysuria.   Musculoskeletal: Negative for back pain.   Skin: Negative for rash.   Neurological: Negative for weakness.   Hematological: Does not bruise/bleed easily.       Physical Exam     Initial Vitals [22]   BP Pulse Resp Temp SpO2   118/71 (!) 160 14 98.9 °F (37.2 °C) 97 %      MAP       --         Physical Exam    Nursing note and vitals reviewed.  Constitutional: He appears well-developed and well-nourished. He is diaphoretic. He appears distressed.   HENT:   Mouth/Throat: Oropharynx is clear and moist.   Eyes: Conjunctivae are normal.   Neck: Neck supple.   Cardiovascular: Normal rate, regular rhythm and intact distal pulses.   Pulmonary/Chest: Breath sounds normal. He has no wheezes. He has no rales.   Abdominal: Abdomen is soft. He exhibits no distension. There is no abdominal tenderness. There is no rebound and no guarding.   Musculoskeletal:         General: No edema.      Cervical back: Neck supple.     Lymphadenopathy:     He has no cervical  adenopathy.   Neurological: He is alert and oriented to person, place, and time.   Skin: Skin is warm. Capillary refill takes less than 2 seconds. No rash noted.   Psychiatric: He has a normal mood and affect.         ED Course   Procedures  Labs Reviewed   CBC W/ AUTO DIFFERENTIAL   COMPREHENSIVE METABOLIC PANEL     EKG Readings: (Independently Interpreted)   EKG:  A flutter at 2:1 at a rate of 163. No ischemic changes        Imaging Results    None          Medications   sodium chloride 0.9% bolus 1,000 mL (1,000 mLs Intravenous New Bag 2/23/22 2003)   diltiaZEM injection 15 mg (15 mg Intravenous Given 2/23/22 2003)     Medical Decision Making:   History:   Old Medical Records: I decided to obtain old medical records.  Initial Assessment:   Emergent evaluation of a 42-year-old male presenting today with palpitations, diaphoresis.    EKG reviewed on arrival, a flutter with a 2-1 block.  Rate 163.    Patient moved to monitor bed, IV line established.  Cardiac monitor placed.            Differential Diagnosis:   A flutter, electrolyte derangement, SVT  Independently Interpreted Test(s):   I have ordered and independently interpreted EKG Reading(s) - see prior notes  Clinical Tests:   Lab Tests: Reviewed and Ordered  Medical Tests: Reviewed and Ordered  ED Management:  - labs  - EKG  - cardiac monitor  - cardizem 15 mg IV            Attending Attestation:         Attending Critical Care:   Critical Care Times:   ==============================================================  · Total Critical Care Time - exclusive of procedural time: 35 minutes.  ==============================================================  Critical care was necessary to treat or prevent imminent or life-threatening deterioration of the following conditions: cardiac arrhythmia.   Critical care was time spent personally by me on the following activities: examination of patient, review of old charts, ordering lab, x-rays, and/or EKG, discussion with  consultants, re-evaluation of patient's conition and evaluation of patient's response to treatment.   Critical Care Condition: potentially life-threatening           ED Course as of 02/23/22 2044 Wed Feb 23, 2022 2041 Repeat EKG after Cardizem 15 mg IV showed conversion to normal sinus rhythm.  Rate of 63. No ischemic changes.    BP remained stable.    CBC unremarkable.  Pending CMP   [GM]   2043 CO2(!): 21 [GM]   2043 BUN(!): 21 [GM]   2043 Creatinine: 0.9 [GM]   2044 CMP unremarkable except for bicarb of 21.    Discussed results with patient and wife at bedside.  Recommend follow up Dr. Daugherty.  She expresses understanding.  Return to ED for worsening symptoms.   [GM]      ED Course User Index  [GM] Gabriela Ramirez MD             Clinical Impression:   Final diagnoses:  [R00.0] Tachycardia  [I48.92] Atrial flutter with rapid ventricular response (Primary)  [R00.2] Palpitations                 Gabriela Ramirez MD  02/23/22 2045

## 2022-02-24 NOTE — FIRST PROVIDER EVALUATION
Emergency Department TeleTriage Encounter Note      CHIEF COMPLAINT    Chief Complaint   Patient presents with    Irregular Heart Beat     HR of 120-150s at home. Scheduled for ablation on 3/16. Pt felt HR shoot up around 1500.        VITAL SIGNS   Initial Vitals [02/23/22 1931]   BP Pulse Resp Temp SpO2   118/71 (!) 160 14 98.9 °F (37.2 °C) 97 %      MAP       --            ALLERGIES    Review of patient's allergies indicates:  No Known Allergies    PROVIDER TRIAGE NOTE  Otherwise healthy 42-year-old male to the ED with a rapid heartbeat.  Patient recently diagnosed with a flutter versus PSVT.  Scheduled for procedure with cardiology later this month.  Compliant with around the male.  Reports fluttering in the chest this afternoon.  Current heart rate 160    ORDERS  Labs Reviewed - No data to display    ED Orders (720h ago, onward)    Start Ordered     Status Ordering Provider    02/23/22 1949 02/23/22 1948  EKG 12-lead  Once         Completed by NEVAEH CORTÉS on 2/23/2022 at  7:49 PM TILA PETERSON            Virtual Visit Note: The provider triage portion of this emergency department evaluation and documentation was performed via [employer], a HIPAA-compliant telemedicine application, in concert with a tele-presenter in the room. A face to face patient evaluation with one of my colleagues will occur once the patient is placed in an emergency department room.      DISCLAIMER: This note was prepared with [employer] voice recognition transcription software. Garbled syntax, mangled pronouns, and other bizarre constructions may be attributed to that software system.

## 2022-02-24 NOTE — TELEPHONE ENCOUNTER
Spoke with patient, reports he went to er last night due to hr 160's and was told to notify Dr Daugherty. Will send message to Dr Daugherty for any new recommendations. Pt is scheduled for ablation on 3/18. Pt and his spouse want to discuss the details of the plan of care further with Dr Daugherty, will schedule a f/u to discuss further

## 2022-02-24 NOTE — TELEPHONE ENCOUNTER
----- Message from Carlos Daugherty MD sent at 2/24/2022  4:15 PM CST -----  Regarding: RE: Episode  yes; increase to 180 daily  thanks    ----- Message -----  From: Kate Woodruff RN  Sent: 2/24/2022   2:43 PM CST  To: Carlos Daugherty MD  Subject: FW: Episode                                      Pt was seen in clinic 2/16 started on verapamil 120mg daily (did not start until Saturday 2/19) went to ER last night 's was given IV diltiazem and told to notify you. Last b/p 123/73 hr 91. He is scheduled for SVT/Flutter ablation on 3/18/22. This is the patient that is uninsured and they are working on applying for medicaid/financial assistance.   Would you want to increase his verapamil? Or any other new recommendations?    Also, of note, the patient and his spouse have a lot of questions about the medication treatment and ablation details. I will schedule them for an appt to discuss their questions/concerns with you.   Thanks  ----- Message -----  From: Paula Justin  Sent: 2/24/2022   1:00 PM CST  To: Kate Woodruff RN  Subject: Episode                                          Pt [phone] says he's scheduled for a procedure, but he had another episode on last night and he went to [facility] ER. After observation he was informed to get with Dr. Daugherty.    Thanks

## 2022-02-24 NOTE — TELEPHONE ENCOUNTER
----- Message from Paula Anderson sent at 2/24/2022 12:56 PM CST -----  Regarding: Episode  Pt [phone] says he's scheduled for a procedure, but he had another episode on last night and he went to [facility] ER. After observation he was informed to get with Dr. Daugherty.    Thanks

## 2022-02-24 NOTE — TELEPHONE ENCOUNTER
Called pt to inform he of Dr Daugherty's recommendation, no answer left message with instructions for increasing the verapamil

## 2022-02-24 NOTE — ED TRIAGE NOTES
Wilbur Diop, a 42 y.o. male presents to the ED w/ complaint of irregular heart beat. Pt reports HR reaching a max of 165 starting around 1300. Pt recently seen for same condition and was d/c after adenosine and metoprolol. Pt followed up with [facility] for medication.     Triage note:  Chief Complaint   Patient presents with    Irregular Heart Beat     HR of 120-150s at home. Scheduled for ablation on 3/16. Pt felt HR shoot up around 1500.      Review of patient's allergies indicates:  No Known Allergies  Past Medical History:   Diagnosis Date    ADHD (attention deficit hyperactivity disorder)     Allergy     Anxiety     Atrial flutter     Depression     History of psychiatric hospitalization     2002 for depression    Hx of psychiatric care     Psychiatric problem     Supraventricular tachycardia     Therapy

## 2022-02-24 NOTE — TELEPHONE ENCOUNTER
Patient states he has been experiencing increasing in his heartbeat. It has been in the 150's. His said he has been having visible sweat on face but not present at the moment and he does occasional have a difficult time breathing. Per protocol advised patient to follow up with ED. Patient acknowledged.   Reason for Disposition   [1] Heart beating very rapidly (e.g., > 140 / minute) AND [2] present now  (Exception: during exercise)    Additional Information   Negative: Passed out (i.e., lost consciousness, collapsed and was not responding)   Negative: Shock suspected (e.g., cold/pale/clammy skin, too weak to stand, low BP, rapid pulse)   Negative: Difficult to awaken or acting confused (e.g., disoriented, slurred speech)   Negative: Visible sweat on face or sweat dripping down face   Negative: Unable to walk, or can only walk with assistance (e.g., requires support)   Negative: [1] Received SHOCK from implantable cardiac defibrillator AND [2] persisting symptoms (i.e., palpitations, lightheadedness)   Negative: [1] Dizziness, lightheadedness, or weakness AND [2] heart beating very rapidly (e.g., > 140 / minute)   Negative: [1] Dizziness, lightheadedness, or weakness AND [2] heart beating very slowly  (e.g., < 50 / minute)   Negative: Sounds like a life-threatening emergency to the triager   Negative: Chest pain   Negative: Implantable Cardiac Defibrillator (ICD) or a pacemaker symptoms or questions   Negative: Difficulty breathing   Negative: Dizziness, lightheadedness, or weakness    Protocols used: HEART RATE AND HEARTBEAT [id number]

## 2022-02-24 NOTE — DISCHARGE INSTRUCTIONS
Your heart rate is back to normal as well as a rhythm.  Please continue taking your rapid male.  Avoid anything that could be strenuous on the heart.  Follow-up with Dr. Daugherty.

## 2022-02-25 ENCOUNTER — TELEPHONE (OUTPATIENT)
Dept: ELECTROPHYSIOLOGY | Facility: CLINIC | Age: 42
End: 2022-02-25
Payer: OTHER GOVERNMENT

## 2022-02-25 RX ORDER — VERAPAMIL HYDROCHLORIDE 180 MG/1
180 CAPSULE, EXTENDED RELEASE ORAL DAILY
Qty: 90 CAPSULE | Refills: 3 | Status: SHIPPED | OUTPATIENT
Start: 2022-02-25 | End: 2022-03-18

## 2022-02-25 NOTE — TELEPHONE ENCOUNTER
Spoke with Mr. Diop to schedule a virtual appt  For him with Dr. Daugherty. The pt is scheudled 3/9/22. The pt verbalized understanding.    ----- Message from Kate Woodruff RN sent at 2/25/2022  8:42 AM CST -----  Pt and spouse want to discuss ablation procedure further with Dr Daugherty prior to his procedure that is scheduled on 3/18. Can you please get them an appt to discuss with Dr Daugehrty? You can see if a virtual visit is an option with the patient  Thanks

## 2022-03-06 ENCOUNTER — OFFICE VISIT (OUTPATIENT)
Dept: URGENT CARE | Facility: CLINIC | Age: 42
End: 2022-03-06

## 2022-03-06 VITALS
WEIGHT: 228 LBS | BODY MASS INDEX: 33.77 KG/M2 | DIASTOLIC BLOOD PRESSURE: 87 MMHG | SYSTOLIC BLOOD PRESSURE: 132 MMHG | HEART RATE: 61 BPM | OXYGEN SATURATION: 100 % | TEMPERATURE: 98 F | RESPIRATION RATE: 17 BRPM | HEIGHT: 69 IN

## 2022-03-06 DIAGNOSIS — R09.81 NASAL CONGESTION: ICD-10-CM

## 2022-03-06 DIAGNOSIS — B96.89 ACUTE BACTERIAL SINUSITIS: Primary | ICD-10-CM

## 2022-03-06 DIAGNOSIS — J01.90 ACUTE BACTERIAL SINUSITIS: Primary | ICD-10-CM

## 2022-03-06 LAB
CTP QC/QA: YES
FLUAV AG NPH QL: NEGATIVE
FLUBV AG NPH QL: NEGATIVE

## 2022-03-06 PROCEDURE — 87804 POCT INFLUENZA A/B: ICD-10-PCS | Mod: 59,QW,S$GLB, | Performed by: EMERGENCY MEDICINE

## 2022-03-06 PROCEDURE — 99214 PR OFFICE/OUTPT VISIT, EST, LEVL IV, 30-39 MIN: ICD-10-PCS | Mod: 25,S$GLB,, | Performed by: EMERGENCY MEDICINE

## 2022-03-06 PROCEDURE — 99214 OFFICE O/P EST MOD 30 MIN: CPT | Mod: 25,S$GLB,, | Performed by: EMERGENCY MEDICINE

## 2022-03-06 PROCEDURE — 87804 INFLUENZA ASSAY W/OPTIC: CPT | Mod: QW,S$GLB,, | Performed by: EMERGENCY MEDICINE

## 2022-03-06 RX ORDER — AMOXICILLIN 875 MG/1
875 TABLET, FILM COATED ORAL 2 TIMES DAILY
Qty: 20 TABLET | Refills: 0 | Status: SHIPPED | OUTPATIENT
Start: 2022-03-06 | End: 2022-03-06

## 2022-03-06 RX ORDER — AMOXICILLIN 875 MG/1
875 TABLET, FILM COATED ORAL 2 TIMES DAILY
Qty: 20 TABLET | Refills: 0 | Status: SHIPPED | OUTPATIENT
Start: 2022-03-06 | End: 2022-03-16

## 2022-03-06 NOTE — PATIENT INSTRUCTIONS
Please return here or go to the Emergency Department for any concerns or worsening of condition.    Patient treated with antibiotics for >7 days of sinus symptoms without relief    Flonase OTC as directed for the next 7 days    Salt Water Nasal Spray OTC 3x/day for the next 7 days    Zyrtec, Claritin, or Allegra OTC as directed for the next 7 days    Tylenol 500mg 2 tabs by mouth every 8 hours  Motrin 200mg 2 tabs by mouth every 8 hours  Alternate Tylenol and Motrin every 4hours    Robitussin OTC as directed for runny nose and congestion      Hot Liquids with natural honey for cough and congestion    Follow up with Primary Care or ENT if not improved in 7-10 Days:  [phone]          Acute Bacterial Rhinosinusitis (ABRS)  Acute bacterial rhinosinusitis (ABRS) is an infection of your nasal cavity and sinuses. Its caused by bacteria. Acute means that youve had symptoms for less than 12 weeks.  Understanding your sinuses  The nasal cavity is the large air-filled space behind your nose. The sinuses are a group of spaces formed by the bones of your face. They connect with your nasal cavity. ABRS causes the tissue lining these spaces to become inflamed. Mucus may not drain normally. This leads to facial pain and other symptoms.  What causes ABRS?  ABRS most often follows an upper respiratory infection caused by a virus. Bacteria then infect the lining of your nasal cavity and sinuses. But you can also get ABRS if you have:  Nasal allergies  Long-term nasal swelling and congestion not caused by allergies  Blockage in the nose  Symptoms of ABRS  The symptoms of ABRS may be different for each person, and can include:  Nasal congestion  Runny nose  Fluid draining from the nose down the throat (postnasal drip)  Headache  Cough  Pain in the sinuses  Thick, colored fluid from the nose (mucus)  Fever  Diagnosing ABRS  ABRS may be diagnosed if youve had an upper respiratory infection like a cold and cough for longer than 10 to  14 days. Your health care provider will ask about your symptoms and your medical history. The provider will check your vital signs, including your temperature. Youll have a physical exam. The health care provider will check your ears, nose, and throat. You likely wont need any tests. If ABRS comes back, you may have a culture or other tests.  Treatment for ABRS  Treatment may include:  Antibiotic medicine. This is for symptoms that last for at least 10 to 14 days.  Nasal corticosteroid medicine. Drops or spray used in the nose can lessen swelling and congestion.  Over-the-counter pain medicine. This is to lessen sinus pain and pressure.  Nasal decongestant medicine. Spray or drops may help to lessen congestion. Do not use them for more than a few days.  Salt wash (saline irrigation). This can help to loosen mucus.  Possible complications of ABRS  ABRS may come back or become long-term (chronic).  In rare cases, ABRS may cause complications such as:   Inflamed tissue around the brain and spinal cord (meningitis)  Inflamed tissue around the eyes (orbital cellulitis)  Inflamed bones around the sinuses (osteitis)  These problems may need to be treated in a hospital with intravenous (IV) antibiotic medicine or surgery.  When to call the health care provider  Call your health care provider if you have any of the following:  Symptoms that dont get better, or get worse  Symptoms that dont get better after 3 to 5 days on antibiotics  Trouble seeing  Swelling around your eyes  Confusion or trouble staying awake   Date Last Reviewed: 3/3/2015  © 6151-4722 [employer]. [address] [place] [address], [place] [address]. All rights reserved. This information is not intended as a substitute for professional medical care. Always follow your healthcare professional's instructions.      Understanding Nasal Anatomy: Inside View  A lot happens under the surface of the nose. The bone and cartilage under the skin give the nose  "most of its size and shape. Other structures inside and behind the nose help you breathe. Learning the anatomy of the nose can help you better understand how the nose works.       Bone supports the upper third (bridge) of the nose. The upper cartilage supports the side of the nose. The lower cartilage adds support, width, and height. It helps shape the nostrils and the tip of the nose. Skin also helps shape the nose.          The nasal cavity is a hollow space behind the nose that air flows through. The septum is a thin "wall" made of cartilage and bone. It divides the inside of the nose into two chambers. The mucous membrane is thin tissue that lines the nose, sinuses, and throat. It warms and moistens the air you breathe in. It also makes the sticky mucus that helps clean the air of dust and other small particles. The turbinates on each side of the nose are curved, bony ridges lined with mucous membrane. They warm and moisten the air you breathe in. The sinuses are hollow, air-filled chambers in the bone around your nose. Mucus from the sinuses drains into the nasal cavity.  Date Last Reviewed: 11/1/2016  © 7541-1597 [employer]. [address] [place] [address], [place] [address]. All rights reserved. This information is not intended as a substitute for professional medical care. Always follow your healthcare professional's instructions.      "

## 2022-03-06 NOTE — PROGRESS NOTES
"Subjective:       Patient ID: Wilbur Diop is a 42 y.o. male.    Vitals:  height is 5' 8.5" (1.74 m) and weight is 103.4 kg (228 lb). His oral temperature is 98.3 °F (36.8 °C). His blood pressure is 132/87 and his pulse is 61. His respiration is 17 and oxygen saturation is 100%.     Chief Complaint: Sinus Problem    Patient complains of pressure on his ears, cough, green mucus, and sore throat. Patient has only first dose of Covid 19, patient tested positive for Covid 19 on December 18, 2021.  Patient states unknown exposure to Covid 19.     Sinus Problem  This is a new problem. The current episode started in the past 7 days. The problem has been gradually worsening since onset. There has been no fever. Associated symptoms include congestion, sinus pressure and a sore throat. Pertinent negatives include no chills, coughing, ear pain, headaches or shortness of breath. Treatments tried: OTC Robitussin DM and Mucinex  The treatment provided mild relief.       Constitution: Negative for chills, fatigue and fever.   HENT: Positive for congestion, sinus pressure and sore throat. Negative for ear pain.    Respiratory: Negative for cough, shortness of breath and asthma.    Gastrointestinal: Negative for abdominal pain, nausea, vomiting and diarrhea.   Genitourinary: Negative for dysuria and frequency.   Skin: Negative for rash, wound and erythema.   Allergic/Immunologic: Negative for asthma.   Neurological: Negative for headaches.       Objective:      Physical Exam   Constitutional: He is oriented to person, place, and time. He appears well-developed. He is cooperative.  Non-toxic appearance. He does not appear ill. No distress.   HENT:   Head: Normocephalic and atraumatic.   Ears:   Right Ear: Hearing, tympanic membrane, external ear and ear canal normal.   Left Ear: Hearing, tympanic membrane, external ear and ear canal normal.   Nose: Mucosal edema and rhinorrhea present. No nasal deformity. No epistaxis. Right sinus " exhibits frontal sinus tenderness. Right sinus exhibits no maxillary sinus tenderness. Left sinus exhibits frontal sinus tenderness. Left sinus exhibits no maxillary sinus tenderness.   Mouth/Throat: Uvula is midline, oropharynx is clear and moist and mucous membranes are normal. No trismus in the jaw. Normal dentition. No uvula swelling. No oropharyngeal exudate, posterior oropharyngeal edema or posterior oropharyngeal erythema.   Eyes: Conjunctivae and lids are normal. No scleral icterus.   Neck: Trachea normal and phonation normal. Neck supple. No edema present. No erythema present. No neck rigidity present.   Cardiovascular: Normal rate, regular rhythm, normal heart sounds and normal pulses.   Pulmonary/Chest: Effort normal and breath sounds normal. No respiratory distress. He has no decreased breath sounds. He has no rhonchi.   Abdominal: Normal appearance.   Musculoskeletal: Normal range of motion.         General: No deformity. Normal range of motion.   Neurological: He is alert and oriented to person, place, and time. He exhibits normal muscle tone. Coordination normal.   Skin: Skin is warm, dry, intact, not diaphoretic and not pale. No erythema   Psychiatric: His speech is normal and behavior is normal. Judgment and thought content normal.   Nursing note and vitals reviewed.        Assessment:       1. Acute bacterial sinusitis    2. Nasal congestion          Plan:         Acute bacterial sinusitis    Nasal congestion  -     POCT Influenza A/B    Other orders  -     Discontinue: amoxicillin (AMOXIL) 875 MG tablet; Take 1 tablet (875 mg total) by mouth 2 (two) times daily. for 10 days  Dispense: 20 tablet; Refill: 0  -     amoxicillin (AMOXIL) 875 MG tablet; Take 1 tablet (875 mg total) by mouth 2 (two) times daily. for 10 days  Dispense: 20 tablet; Refill: 0                 Patient Instructions     Please return here or go to the Emergency Department for any concerns or worsening of condition.    Patient  treated with antibiotics for >7 days of sinus symptoms without relief    Flonase OTC as directed for the next 7 days    Salt Water Nasal Spray OTC 3x/day for the next 7 days    Zyrtec, Claritin, or Allegra OTC as directed for the next 7 days    Tylenol 500mg 2 tabs by mouth every 8 hours  Motrin 200mg 2 tabs by mouth every 8 hours  Alternate Tylenol and Motrin every 4hours    Robitussin OTC as directed for runny nose and congestion      Hot Liquids with natural honey for cough and congestion    Follow up with Primary Care or ENT if not improved in 7-10 Days:  [phone]          Acute Bacterial Rhinosinusitis (ABRS)  Acute bacterial rhinosinusitis (ABRS) is an infection of your nasal cavity and sinuses. Its caused by bacteria. Acute means that youve had symptoms for less than 12 weeks.  Understanding your sinuses  The nasal cavity is the large air-filled space behind your nose. The sinuses are a group of spaces formed by the bones of your face. They connect with your nasal cavity. ABRS causes the tissue lining these spaces to become inflamed. Mucus may not drain normally. This leads to facial pain and other symptoms.  What causes ABRS?  ABRS most often follows an upper respiratory infection caused by a virus. Bacteria then infect the lining of your nasal cavity and sinuses. But you can also get ABRS if you have:  · Nasal allergies  · Long-term nasal swelling and congestion not caused by allergies  · Blockage in the nose  Symptoms of ABRS  The symptoms of ABRS may be different for each person, and can include:  · Nasal congestion  · Runny nose  · Fluid draining from the nose down the throat (postnasal drip)  · Headache  · Cough  · Pain in the sinuses  · Thick, colored fluid from the nose (mucus)  · Fever  Diagnosing ABRS  ABRS may be diagnosed if youve had an upper respiratory infection like a cold and cough for longer than 10 to 14 days. Your health care provider will ask about your symptoms and your medical  history. The provider will check your vital signs, including your temperature. Youll have a physical exam. The health care provider will check your ears, nose, and throat. You likely wont need any tests. If ABRS comes back, you may have a culture or other tests.  Treatment for ABRS  Treatment may include:  · Antibiotic medicine. This is for symptoms that last for at least 10 to 14 days.  · Nasal corticosteroid medicine. Drops or spray used in the nose can lessen swelling and congestion.  · Over-the-counter pain medicine. This is to lessen sinus pain and pressure.  · Nasal decongestant medicine. Spray or drops may help to lessen congestion. Do not use them for more than a few days.  · Salt wash (saline irrigation). This can help to loosen mucus.  Possible complications of ABRS  ABRS may come back or become long-term (chronic).  In rare cases, ABRS may cause complications such as:   · Inflamed tissue around the brain and spinal cord (meningitis)  · Inflamed tissue around the eyes (orbital cellulitis)  · Inflamed bones around the sinuses (osteitis)  These problems may need to be treated in a hospital with intravenous (IV) antibiotic medicine or surgery.  When to call the health care provider  Call your health care provider if you have any of the following:  · Symptoms that dont get better, or get worse  · Symptoms that dont get better after 3 to 5 days on antibiotics  · Trouble seeing  · Swelling around your eyes  · Confusion or trouble staying awake   Date Last Reviewed: 3/3/2015  © 9417-2282 [employer]. [address] [place] [address], [place] [address]. All rights reserved. This information is not intended as a substitute for professional medical care. Always follow your healthcare professional's instructions.      Understanding Nasal Anatomy: Inside View  A lot happens under the surface of the nose. The bone and cartilage under the skin give the nose most of its size and shape. Other structures inside and  "behind the nose help you breathe. Learning the anatomy of the nose can help you better understand how the nose works.       Bone supports the upper third (bridge) of the nose. The upper cartilage supports the side of the nose. The lower cartilage adds support, width, and height. It helps shape the nostrils and the tip of the nose. Skin also helps shape the nose.          The nasal cavity is a hollow space behind the nose that air flows through. The septum is a thin "wall" made of cartilage and bone. It divides the inside of the nose into two chambers. The mucous membrane is thin tissue that lines the nose, sinuses, and throat. It warms and moistens the air you breathe in. It also makes the sticky mucus that helps clean the air of dust and other small particles. The turbinates on each side of the nose are curved, bony ridges lined with mucous membrane. They warm and moisten the air you breathe in. The sinuses are hollow, air-filled chambers in the bone around your nose. Mucus from the sinuses drains into the nasal cavity.  Date Last Reviewed: 11/1/2016  © 1050-1514 [employer]. [address] [place] [address]. All rights reserved. This information is not intended as a substitute for professional medical care. Always follow your healthcare professional's instructions.          "

## 2022-03-07 ENCOUNTER — HOSPITAL ENCOUNTER (OUTPATIENT)
Dept: CARDIOLOGY | Facility: CLINIC | Age: 42
Discharge: HOME | End: 2022-03-07

## 2022-03-07 ENCOUNTER — HOSPITAL ENCOUNTER (OUTPATIENT)
Dept: CARDIOLOGY | Facility: HOSPITAL | Age: 42
Discharge: HOME | End: 2022-03-07
Attending: INTERNAL MEDICINE

## 2022-03-07 ENCOUNTER — PATIENT MESSAGE (OUTPATIENT)
Dept: ELECTROPHYSIOLOGY | Facility: CLINIC | Age: 42
End: 2022-03-07
Payer: OTHER GOVERNMENT

## 2022-03-07 VITALS
HEART RATE: 64 BPM | BODY MASS INDEX: 34.51 KG/M2 | WEIGHT: 233 LBS | HEIGHT: 69 IN | SYSTOLIC BLOOD PRESSURE: 130 MMHG | DIASTOLIC BLOOD PRESSURE: 82 MMHG

## 2022-03-07 DIAGNOSIS — I47.10 SVT (SUPRAVENTRICULAR TACHYCARDIA): ICD-10-CM

## 2022-03-07 LAB
ASCENDING AORTA: 3.16 CM
AV INDEX (PROSTH): 0.83
AV MEAN GRADIENT: 5 MMHG
AV PEAK GRADIENT: 9 MMHG
AV VALVE AREA: 3.16 CM2
AV VELOCITY RATIO: 0.87
BSA FOR ECHO PROCEDURE: 2.26 M2
CV ECHO LV RWT: 0.41 CM
DOP CALC AO PEAK VEL: 1.52 M/S
DOP CALC AO VTI: 27.95 CM
DOP CALC LVOT AREA: 3.8 CM2
DOP CALC LVOT DIAMETER: 2.2 CM
DOP CALC LVOT PEAK VEL: 1.32 M/S
DOP CALC LVOT STROKE VOLUME: 88.18 CM3
DOP CALCLVOT PEAK VEL VTI: 23.21 CM
E WAVE DECELERATION TIME: 264.16 MSEC
E/A RATIO: 0.94
E/E' RATIO: 7.3 M/S
ECHO LV POSTERIOR WALL: 1.03 CM (ref 0.6–1.1)
EJECTION FRACTION: 60 %
FRACTIONAL SHORTENING: 38 % (ref 28–44)
INTERVENTRICULAR SEPTUM: 1 CM (ref 0.6–1.1)
LA MAJOR: 5.19 CM
LA MINOR: 5.07 CM
LA WIDTH: 3.92 CM
LEFT ATRIUM SIZE: 3.46 CM
LEFT ATRIUM VOLUME INDEX MOD: 28.1 ML/M2
LEFT ATRIUM VOLUME INDEX: 27 ML/M2
LEFT ATRIUM VOLUME MOD: 61.5 CM3
LEFT ATRIUM VOLUME: 59.13 CM3
LEFT INTERNAL DIMENSION IN SYSTOLE: 3.1 CM (ref 2.1–4)
LEFT VENTRICLE DIASTOLIC VOLUME INDEX: 54.53 ML/M2
LEFT VENTRICLE DIASTOLIC VOLUME: 119.41 ML
LEFT VENTRICLE MASS INDEX: 85 G/M2
LEFT VENTRICLE SYSTOLIC VOLUME INDEX: 17.3 ML/M2
LEFT VENTRICLE SYSTOLIC VOLUME: 37.88 ML
LEFT VENTRICULAR INTERNAL DIMENSION IN DIASTOLE: 5.02 CM (ref 3.5–6)
LEFT VENTRICULAR MASS: 186.88 G
LV LATERAL E/E' RATIO: 5.6 M/S
LV SEPTAL E/E' RATIO: 10.5 M/S
MV PEAK A VEL: 0.89 M/S
MV PEAK E VEL: 0.84 M/S
MV STENOSIS PRESSURE HALF TIME: 76.61 MS
MV VALVE AREA P 1/2 METHOD: 2.87 CM2
RA MAJOR: 4.95 CM
RA PRESSURE: 3 MMHG
RA WIDTH: 3.8 CM
RIGHT VENTRICULAR END-DIASTOLIC DIMENSION: 3.61 CM
RV TISSUE DOPPLER FREE WALL SYSTOLIC VELOCITY 1 (APICAL 4 CHAMBER VIEW): 9.98 CM/S
SINUS: 3.02 CM
STJ: 2.57 CM
TDI LATERAL: 0.15 M/S
TDI SEPTAL: 0.08 M/S
TDI: 0.12 M/S
TRICUSPID ANNULAR PLANE SYSTOLIC EXCURSION: 1.55 CM

## 2022-03-07 PROCEDURE — 93306 TTE W/DOPPLER COMPLETE: CPT | Mod: 26,,, | Performed by: INTERNAL MEDICINE

## 2022-03-07 PROCEDURE — 93005 ELECTROCARDIOGRAM TRACING: CPT | Mod: PBBFAC | Performed by: INTERNAL MEDICINE

## 2022-03-07 PROCEDURE — 93010 ELECTROCARDIOGRAM REPORT: CPT | Mod: S$PBB,,, | Performed by: INTERNAL MEDICINE

## 2022-03-07 PROCEDURE — 93306 ECHO (CUPID ONLY): ICD-10-PCS | Mod: 26,,, | Performed by: INTERNAL MEDICINE

## 2022-03-07 PROCEDURE — 93010 RHYTHM STRIP: ICD-10-PCS | Mod: S$PBB,,, | Performed by: INTERNAL MEDICINE

## 2022-03-07 PROCEDURE — 93306 TTE W/DOPPLER COMPLETE: CPT

## 2022-03-08 ENCOUNTER — PATIENT MESSAGE (OUTPATIENT)
Dept: PSYCHIATRY | Facility: CLINIC | Age: 42
End: 2022-03-08
Payer: OTHER GOVERNMENT

## 2022-03-11 ENCOUNTER — OFFICE VISIT (OUTPATIENT)
Dept: ELECTROPHYSIOLOGY | Facility: CLINIC | Age: 42
End: 2022-03-11

## 2022-03-11 VITALS — WEIGHT: 231.5 LBS | HEIGHT: 68 IN | HEART RATE: 58 BPM | BODY MASS INDEX: 35.09 KG/M2

## 2022-03-11 DIAGNOSIS — I48.3 TYPICAL ATRIAL FLUTTER: Primary | ICD-10-CM

## 2022-03-11 PROCEDURE — 99215 PR OFFICE/OUTPT VISIT, EST, LEVL V, 40-54 MIN: ICD-10-PCS | Mod: 95,,, | Performed by: INTERNAL MEDICINE

## 2022-03-11 PROCEDURE — 99215 OFFICE O/P EST HI 40 MIN: CPT | Mod: 95,,, | Performed by: INTERNAL MEDICINE

## 2022-03-11 NOTE — PROGRESS NOTES
Subjective:    Patient ID:  Wilbur Diop is a 42 y.o. male who presents for evaluation of SVT/AFL    The patient location is: home  The chief complaint leading to consultation is: AFL  Visit type: audiovisual  Total time spent with patient: 30 min  Each patient to whom he or she provides medical services by telemedicine is:  (1) informed of the relationship between the physician and patient and the respective role of any other health care provider with respect to management of the patient; and (2) notified that he or she may decline to receive medical services by telemedicine and may withdraw from such care at any time.    HPI   42 y.o. M  anxiety, depression; on meds  AFL/PSVT    Had racing heart rate for >2 days. Went to ER 2/11/22; diagnosed with SVT but ECG appears c/w typical AFL with 2:1 conduction. Reportedly CSM had no effect, and arrhythmia terminated following 12 mg adenosine. No ECG of termination.  Felt well since.    Went to ER recently. Again c/w typical AFL. Rx or AFL with RVR was diltiazem; repeat ECG showed NSR.    My interpretation of 3/7/22 ECG is NSR    Review of Systems   Constitutional: Negative. Negative for malaise/fatigue.   HENT: Negative.  Negative for ear pain and tinnitus.    Eyes: Negative for blurred vision.   Cardiovascular: Positive for palpitations. Negative for chest pain, dyspnea on exertion, near-syncope and syncope.   Respiratory: Negative.  Negative for shortness of breath.    Endocrine: Negative.  Negative for polyuria.   Hematologic/Lymphatic: Does not bruise/bleed easily.   Skin: Negative.  Negative for rash.   Musculoskeletal: Negative.  Negative for joint pain and muscle weakness.   Gastrointestinal: Negative.  Negative for abdominal pain and change in bowel habit.   Genitourinary: Negative for frequency.   Neurological: Negative.  Negative for dizziness and weakness.   Psychiatric/Behavioral: Positive for depression. The patient is nervous/anxious.   "  Allergic/Immunologic: Negative for environmental allergies.        Objective:      Well developed, well nourished.   No distress.  Speaks in full sentences.    Assessment:       AFL  SVT?     Plan:       Discussed AFL and its basic pathophysiology, including its health implications and treatment options (rate vs. rhythm control, meds vs. procedural/device treatment) as appropriate for the patient.  ECG is most c/w typical AFL, but "termination" following adenosine is not characteristic of AFL. Another possibility would be a low septal AT (e.g., at the CS os).  Discussed options: nothing vs. meds vs. ablation.    At our last visit, I spent about a half hour discussing the nature of EP study and ablation, including possible transseptal puncture. We discused risks and benefits at length. Our discussion included, but was not limited to the risk of death, infection, bleeding, stroke, MI, cardiac perforation, embolism, cardiac tamponade, skin burns, and other organic injury including the possibility for need for surgery or pacemaker implantation.  Today I answered more questions posed by patient and his wife re: the procedure and the arrhythmia.  I discussed with patient risks, indications, benefits, and alternatives of the planned procedure. All questions were answered. Patient understands and wishes to proceed.    Following CTI ablation, plan EPS for attempted induction of PSVT (chiefly AT). If SVT inducible, will likely target it for ablation in addition to ablating the CTI for typical flutter. If noninducible, then just ablate flutter.    I discussed with patient risks, indications, benefits, and alternatives of the planned procedure. All questions were answered. Patient understands and wishes to proceed.  Verapamil until ablation; hold it for 3 days preablation. Will likely wean it to off afterward.                  "

## 2022-03-15 ENCOUNTER — LAB VISIT (OUTPATIENT)
Dept: PRIMARY CARE CLINIC | Facility: CLINIC | Age: 42
End: 2022-03-15
Payer: OTHER GOVERNMENT

## 2022-03-15 DIAGNOSIS — I48.3 TYPICAL ATRIAL FLUTTER: ICD-10-CM

## 2022-03-15 DIAGNOSIS — I47.10 PSVT (PAROXYSMAL SUPRAVENTRICULAR TACHYCARDIA): ICD-10-CM

## 2022-03-15 LAB
SARS-COV-2 RNA RESP QL NAA+PROBE: NOT DETECTED
SARS-COV-2- CYCLE NUMBER: NORMAL

## 2022-03-15 PROCEDURE — U0005 INFEC AGEN DETEC AMPLI PROBE: HCPCS | Performed by: INTERNAL MEDICINE

## 2022-03-15 PROCEDURE — U0003 INFECTIOUS AGENT DETECTION BY NUCLEIC ACID (DNA OR RNA); SEVERE ACUTE RESPIRATORY SYNDROME CORONAVIRUS 2 (SARS-COV-2) (CORONAVIRUS DISEASE [COVID-19]), AMPLIFIED PROBE TECHNIQUE, MAKING USE OF HIGH THROUGHPUT TECHNOLOGIES AS DESCRIBED BY CMS-2020-01-R: HCPCS | Performed by: INTERNAL MEDICINE

## 2022-03-17 ENCOUNTER — TELEPHONE (OUTPATIENT)
Dept: ELECTROPHYSIOLOGY | Facility: CLINIC | Age: 42
End: 2022-03-17
Payer: OTHER GOVERNMENT

## 2022-03-17 NOTE — H&P
Electrophysiology Pre Procedure H&P    HPI:   Wilbur Diop is a 42 y.o. male who presents for evaluation of SVT/AFL     The patient location is: home  The chief complaint leading to consultation is: AFL  Visit type: audiovisual  Total time spent with patient: 30 min  Each patient to whom he or she provides medical services by telemedicine is:  (1) informed of the relationship between the physician and patient and the respective role of any other health care provider with respect to management of the patient; and (2) notified that he or she may decline to receive medical services by telemedicine and may withdraw from such care at any time.     HPI   42 y.o. M  anxiety, depression; on meds  AFL/PSVT     Had racing heart rate for >2 days. Went to ER 22; diagnosed with SVT but ECG appears c/w typical AFL with 2:1 conduction. Reportedly CSM had no effect, and arrhythmia terminated following 12 mg adenosine. No ECG of termination.  Felt well since.     Went to ER recently. Again c/w typical AFL. Rx or AFL with RVR was diltiazem; repeat ECG showed NSR.     My interpretation of 3/7/22 ECG is NSR    Interval History:  Patient denies chest pain, fevers chills.   Past Medical History:   Diagnosis Date    ADHD (attention deficit hyperactivity disorder)     Allergy     Anxiety     Atrial flutter     Depression     History of psychiatric hospitalization      for depression    Hx of psychiatric care     Psychiatric problem     Supraventricular tachycardia     Therapy         Social History     Socioeconomic History    Marital status:     Number of children: 2   Tobacco Use    Smoking status: Former Smoker     Quit date: 10/22/2004     Years since quittin.4    Smokeless tobacco: Never Used   Substance and Sexual Activity    Alcohol use: Yes     Alcohol/week: 1.7 standard drinks     Types: 2 Standard drinks or equivalent per week     Comment: once a month; socially    Drug use: No    Sexual  activity: Yes     Partners: Female   Other Topics Concern    Patient feels they ought to cut down on drinking/drug use No    Patient annoyed by others criticizing their drinking/drug use No    Patient has felt bad or guilty about drinking/drug use No    Patient has had a drink/used drugs as an eye opener in the AM No        Family History   Problem Relation Age of Onset    Depression Mother     Anxiety disorder Mother     No Known Problems Father     No Known Problems Sister     No Known Problems Brother     No Known Problems Maternal Aunt     No Known Problems Maternal Uncle     No Known Problems Paternal Aunt     No Known Problems Paternal Uncle     No Known Problems Maternal Grandmother     Cancer Maternal Grandfather     No Known Problems Paternal Grandmother     No Known Problems Paternal Grandfather     Amblyopia Neg Hx     Blindness Neg Hx     Cataracts Neg Hx     Diabetes Neg Hx     Glaucoma Neg Hx     Hypertension Neg Hx     Macular degeneration Neg Hx     Retinal detachment Neg Hx     Strabismus Neg Hx     Stroke Neg Hx     Thyroid disease Neg Hx         Current Facility-Administered Medications   Medication Dose Route Frequency Provider Last Rate Last Admin    sodium chloride 0.9% bolus 1,000 mL  1,000 mL Intravenous Once Brenda Graves NP          ROS:  Constitutional: (-)fevers, (-)chills, (-)night sweats  HEENT: (-) headaches (-) lightheadedness (-) blurry vision  Cardiovascular: (-)chest pain (-)paroxysmal nocturnal dyspnea (-)orthopnea  Respiratory: (-)shortness of breath, (-)dyspnea on exertion (-)hemoptysis  Gastrointestinal: (-)abdominal pain (-)nausea (-)vomiting (-)tarry stools  Musculoskeletal: (-)arthralgias (-)limited range of motion  Neurologic: (-)parasthesias (-)mood disorder (-)anxiety  Endo: (-)polyuria (-)polydipsia (-)heat/cold intolerance  Skin: (-)rash     Vitals:    03/18/22 1204 03/18/22 1205   BP: 124/78 138/80   BP Location: Left arm Right arm  "  Patient Position: Lying Lying   Pulse: (!) 58    Resp: 18    Temp: 98.2 °F (36.8 °C)    TempSrc: Temporal    SpO2: 96%    Weight: 104.3 kg (230 lb)    Height: 5' 9" (1.753 m)      Physical Exam:   Gen: no acute distress, pleasant patient answering questions appropriately  HEENT: extra-ocular muscles intact, normocephalic-atraumatic  Neck: no jugular venous distension, no lymphadenopathy, no thyromegaly, no carotid bruits  CVS: regular rate and rhythm, S1S2 normal, no murmurs/rubs/gallops  CHEST: clear to auscultation bilaterally, no wheezes/rales/ronchi  ABD: Soft, non-tender, nondistended, bowel sounds present  EXT: No Edema, 2+ pulses bilaterally (radial, femoral, dorsales pedis, posterior tibial)  NEURO: awake, alert, and oriented   Skin: No rash     Review of Labs:   WNL    Most recent ECG  Sinus Bradycardia 58bpm     Assessment/Plan:  Wilbur Diop is a 42 y.o. male with AFL/SVT  -EPS for attempted induction of SVT, if inducible will evaluate AT vs AFL and ablate accordingly, if non inducible then CTI    Quique Jauregui MD PGY 7    "

## 2022-03-17 NOTE — TELEPHONE ENCOUNTER
See previous phone message, dr saleem was notified that pt did not hold his verapamil 3 days, ok to proceed with procedure, notified pt

## 2022-03-17 NOTE — TELEPHONE ENCOUNTER
----- Message from Paula Anderson sent at 3/17/2022 12:50 PM CDT -----  Regarding: Procedure  Pt would like a call [phone] in ref to his procedure.    Thanks

## 2022-03-17 NOTE — TELEPHONE ENCOUNTER
Spoke to patient    CONFIRMED procedure arrival time of 10 a.m.    Reiterated instructions including:  -Directions to check in desk  -NPO after midnight night prior to procedure  -High importance of HOLDING verapamil 3 days, pt reports he took the verapamil every day except today, will notify Dr Daugherty   -Pre-procedure LABS reviewed no alerts noted  -COVID test negative  -Confirmed no fever, cough, or shortness of breath in the past 30 days  -Confirmed no redness, rash, irritation, or yeast infection to groin area.   -Reviewed current visitor policy    Patient verbalizes understanding of above and appreciates call.      Notified Dr Daugherty of pt not holding his verapamil 3 days, only held today, ok to proceed

## 2022-03-18 ENCOUNTER — HOSPITAL ENCOUNTER (OUTPATIENT)
Facility: HOSPITAL | Age: 42
Discharge: HOME | End: 2022-03-18
Attending: INTERNAL MEDICINE | Admitting: INTERNAL MEDICINE

## 2022-03-18 ENCOUNTER — ANESTHESIA (OUTPATIENT)
Dept: MEDSURG UNIT | Facility: HOSPITAL | Age: 42
End: 2022-03-18

## 2022-03-18 ENCOUNTER — ANESTHESIA EVENT (OUTPATIENT)
Dept: MEDSURG UNIT | Facility: HOSPITAL | Age: 42
End: 2022-03-18

## 2022-03-18 VITALS
RESPIRATION RATE: 18 BRPM | BODY MASS INDEX: 34.07 KG/M2 | OXYGEN SATURATION: 95 % | TEMPERATURE: 98 F | HEART RATE: 58 BPM | HEIGHT: 69 IN | DIASTOLIC BLOOD PRESSURE: 65 MMHG | WEIGHT: 230 LBS | SYSTOLIC BLOOD PRESSURE: 118 MMHG

## 2022-03-18 DIAGNOSIS — I48.3 TYPICAL ATRIAL FLUTTER: ICD-10-CM

## 2022-03-18 DIAGNOSIS — Z86.79 STATUS POST ABLATION OPERATION FOR ARRHYTHMIA: ICD-10-CM

## 2022-03-18 DIAGNOSIS — Z98.890 STATUS POST ABLATION OPERATION FOR ARRHYTHMIA: ICD-10-CM

## 2022-03-18 DIAGNOSIS — I49.9 ARRHYTHMIA: ICD-10-CM

## 2022-03-18 DIAGNOSIS — I47.10 PSVT (PAROXYSMAL SUPRAVENTRICULAR TACHYCARDIA): ICD-10-CM

## 2022-03-18 DIAGNOSIS — I48.92 ATRIAL FLUTTER: ICD-10-CM

## 2022-03-18 LAB
CTP QC/QA: YES
POCT GLUCOSE: 111 MG/DL (ref 70–110)
SARS-COV-2 AG RESP QL IA.RAPID: NEGATIVE

## 2022-03-18 PROCEDURE — 37000009 HC ANESTHESIA EA ADD 15 MINS: Performed by: INTERNAL MEDICINE

## 2022-03-18 PROCEDURE — 94761 N-INVAS EAR/PLS OXIMETRY MLT: CPT

## 2022-03-18 PROCEDURE — 93653 COMPRE EP EVAL TX SVT: CPT | Mod: ,,, | Performed by: INTERNAL MEDICINE

## 2022-03-18 PROCEDURE — 93010 ELECTROCARDIOGRAM REPORT: CPT | Mod: ,,, | Performed by: INTERNAL MEDICINE

## 2022-03-18 PROCEDURE — 82962 GLUCOSE BLOOD TEST: CPT | Performed by: INTERNAL MEDICINE

## 2022-03-18 PROCEDURE — 93005 ELECTROCARDIOGRAM TRACING: CPT

## 2022-03-18 PROCEDURE — 63600175 PHARM REV CODE 636 W HCPCS: Performed by: NURSE ANESTHETIST, CERTIFIED REGISTERED

## 2022-03-18 PROCEDURE — D9220A PRA ANESTHESIA: ICD-10-PCS | Mod: ,,, | Performed by: ANESTHESIOLOGY

## 2022-03-18 PROCEDURE — 93010 EKG 12-LEAD: ICD-10-PCS | Mod: 76,,, | Performed by: INTERNAL MEDICINE

## 2022-03-18 PROCEDURE — 37000008 HC ANESTHESIA 1ST 15 MINUTES: Performed by: INTERNAL MEDICINE

## 2022-03-18 PROCEDURE — C1733 CATH, EP, OTHR THAN COOL-TIP: HCPCS | Performed by: INTERNAL MEDICINE

## 2022-03-18 PROCEDURE — 27201423 OPTIME MED/SURG SUP & DEVICES STERILE SUPPLY: Performed by: INTERNAL MEDICINE

## 2022-03-18 PROCEDURE — 25000003 PHARM REV CODE 250: Performed by: INTERNAL MEDICINE

## 2022-03-18 PROCEDURE — C1894 INTRO/SHEATH, NON-LASER: HCPCS | Performed by: INTERNAL MEDICINE

## 2022-03-18 PROCEDURE — 27000221 HC OXYGEN, UP TO 24 HOURS

## 2022-03-18 PROCEDURE — D9220A PRA ANESTHESIA: Mod: ,,, | Performed by: ANESTHESIOLOGY

## 2022-03-18 PROCEDURE — 93010 ELECTROCARDIOGRAM REPORT: CPT | Mod: 76,,, | Performed by: INTERNAL MEDICINE

## 2022-03-18 PROCEDURE — C1893 INTRO/SHEATH, FIXED,NON-PEEL: HCPCS | Performed by: INTERNAL MEDICINE

## 2022-03-18 PROCEDURE — 93653 COMPRE EP EVAL TX SVT: CPT | Performed by: INTERNAL MEDICINE

## 2022-03-18 PROCEDURE — 27201037 HC PRESSURE MONITORING SET UP

## 2022-03-18 PROCEDURE — 93653 PR ELECTROPHYS EVAL, COMPREHEN, W/SUPRAVENT TACHYCARD TRMT: ICD-10-PCS | Mod: ,,, | Performed by: INTERNAL MEDICINE

## 2022-03-18 PROCEDURE — 99499 NO LOS: ICD-10-PCS | Mod: ,,, | Performed by: INTERNAL MEDICINE

## 2022-03-18 PROCEDURE — 99499 UNLISTED E&M SERVICE: CPT | Mod: ,,, | Performed by: INTERNAL MEDICINE

## 2022-03-18 PROCEDURE — C1730 CATH, EP, 19 OR FEW ELECT: HCPCS | Performed by: INTERNAL MEDICINE

## 2022-03-18 PROCEDURE — 25000003 PHARM REV CODE 250: Performed by: NURSE PRACTITIONER

## 2022-03-18 RX ORDER — PROPOFOL 10 MG/ML
VIAL (ML) INTRAVENOUS CONTINUOUS PRN
Status: DISCONTINUED | OUTPATIENT
Start: 2022-03-18 | End: 2022-03-18

## 2022-03-18 RX ORDER — FENTANYL CITRATE 50 UG/ML
25 INJECTION, SOLUTION INTRAMUSCULAR; INTRAVENOUS EVERY 5 MIN PRN
Status: DISCONTINUED | OUTPATIENT
Start: 2022-03-18 | End: 2022-03-18 | Stop reason: HOSPADM

## 2022-03-18 RX ORDER — SODIUM CHLORIDE 0.9 % (FLUSH) 0.9 %
3 SYRINGE (ML) INJECTION
Status: DISCONTINUED | OUTPATIENT
Start: 2022-03-18 | End: 2022-03-18 | Stop reason: HOSPADM

## 2022-03-18 RX ORDER — MIDAZOLAM HYDROCHLORIDE 1 MG/ML
INJECTION, SOLUTION INTRAMUSCULAR; INTRAVENOUS
Status: DISCONTINUED | OUTPATIENT
Start: 2022-03-18 | End: 2022-03-18

## 2022-03-18 RX ORDER — PROPOFOL 10 MG/ML
VIAL (ML) INTRAVENOUS
Status: DISCONTINUED | OUTPATIENT
Start: 2022-03-18 | End: 2022-03-18

## 2022-03-18 RX ORDER — DEXAMETHASONE SODIUM PHOSPHATE 4 MG/ML
INJECTION, SOLUTION INTRA-ARTICULAR; INTRALESIONAL; INTRAMUSCULAR; INTRAVENOUS; SOFT TISSUE
Status: DISCONTINUED | OUTPATIENT
Start: 2022-03-18 | End: 2022-03-18

## 2022-03-18 RX ORDER — MIDAZOLAM HYDROCHLORIDE 5 MG/ML
INJECTION INTRAMUSCULAR; INTRAVENOUS
Status: DISCONTINUED | OUTPATIENT
Start: 2022-03-18 | End: 2022-03-18

## 2022-03-18 RX ORDER — DIPHENHYDRAMINE HYDROCHLORIDE 50 MG/ML
25 INJECTION INTRAMUSCULAR; INTRAVENOUS EVERY 6 HOURS PRN
Status: DISCONTINUED | OUTPATIENT
Start: 2022-03-18 | End: 2022-03-18 | Stop reason: HOSPADM

## 2022-03-18 RX ORDER — ACETAMINOPHEN 325 MG/1
650 TABLET ORAL EVERY 4 HOURS PRN
Status: DISCONTINUED | OUTPATIENT
Start: 2022-03-18 | End: 2022-03-18 | Stop reason: HOSPADM

## 2022-03-18 RX ORDER — PROCHLORPERAZINE EDISYLATE 5 MG/ML
5 INJECTION INTRAMUSCULAR; INTRAVENOUS EVERY 30 MIN PRN
Status: DISCONTINUED | OUTPATIENT
Start: 2022-03-18 | End: 2022-03-18 | Stop reason: HOSPADM

## 2022-03-18 RX ORDER — ONDANSETRON 2 MG/ML
INJECTION INTRAMUSCULAR; INTRAVENOUS
Status: DISCONTINUED | OUTPATIENT
Start: 2022-03-18 | End: 2022-03-18

## 2022-03-18 RX ORDER — LIDOCAINE HYDROCHLORIDE 20 MG/ML
INJECTION, SOLUTION INFILTRATION; PERINEURAL
Status: DISCONTINUED | OUTPATIENT
Start: 2022-03-18 | End: 2022-03-18 | Stop reason: HOSPADM

## 2022-03-18 RX ORDER — NAPROXEN SODIUM 220 MG/1
81 TABLET, FILM COATED ORAL DAILY
Qty: 30 TABLET | Refills: 0 | OUTPATIENT
Start: 2022-03-18 | End: 2022-09-27

## 2022-03-18 RX ORDER — HEPARIN SOD,PORCINE/0.9 % NACL 1000/500ML
INTRAVENOUS SOLUTION INTRAVENOUS
Status: DISCONTINUED | OUTPATIENT
Start: 2022-03-18 | End: 2022-03-18 | Stop reason: HOSPADM

## 2022-03-18 RX ORDER — HYDROMORPHONE HYDROCHLORIDE 1 MG/ML
0.2 INJECTION, SOLUTION INTRAMUSCULAR; INTRAVENOUS; SUBCUTANEOUS EVERY 5 MIN PRN
Status: DISCONTINUED | OUTPATIENT
Start: 2022-03-18 | End: 2022-03-18 | Stop reason: HOSPADM

## 2022-03-18 RX ADMIN — ONDANSETRON HYDROCHLORIDE 4 MG: 2 INJECTION INTRAMUSCULAR; INTRAVENOUS at 03:03

## 2022-03-18 RX ADMIN — MIDAZOLAM HYDROCHLORIDE 2 MG: 1 INJECTION, SOLUTION INTRAMUSCULAR; INTRAVENOUS at 03:03

## 2022-03-18 RX ADMIN — PROPOFOL 50 MCG/KG/MIN: 10 INJECTION, EMULSION INTRAVENOUS at 03:03

## 2022-03-18 RX ADMIN — DEXAMETHASONE SODIUM PHOSPHATE 4 MG: 4 INJECTION, SOLUTION INTRAMUSCULAR; INTRAVENOUS at 03:03

## 2022-03-18 RX ADMIN — Medication 80 MG: at 03:03

## 2022-03-18 RX ADMIN — SODIUM CHLORIDE: 0.9 INJECTION, SOLUTION INTRAVENOUS at 02:03

## 2022-03-18 NOTE — Clinical Note
The groin was prepped. The site was prepped with ChloraPrep. The site was clipped. The patient was draped. The patient was positioned supine. The patient was secured using safety straps, with a wedge under the patient and to an armboard.

## 2022-03-18 NOTE — DISCHARGE SUMMARY
[facility]  Cardiology  Discharge Summary      Patient Name: Wilbur Diop  MRN: [id number]  Admission Date: 3/18/2022  Hospital Length of Stay: 0 days  Discharge Date and Time:  03/18/2022 5:37 PM  Attending Physician: Carlos Daugherty MD  Discharging Provider: Quique Jauregui MD  Primary Care Physician: Ge Naranjo MD    HPI: HPI:   Wilbur Diop is a 42 y.o. male who presents for evaluation of SVT/AFL     The patient location is: home  The chief complaint leading to consultation is: AFL  Visit type: audiovisual  Total time spent with patient: 30 min  Each patient to whom he or she provides medical services by telemedicine is:  (1) informed of the relationship between the physician and patient and the respective role of any other health care provider with respect to management of the patient; and (2) notified that he or she may decline to receive medical services by telemedicine and may withdraw from such care at any time.     HPI   42 y.o. M  anxiety, depression; on meds  AFL/PSVT     Had racing heart rate for >2 days. Went to ER 2/11/22; diagnosed with SVT but ECG appears c/w typical AFL with 2:1 conduction. Reportedly CSM had no effect, and arrhythmia terminated following 12 mg adenosine. No ECG of termination.  Felt well since.     Went to ER recently. Again c/w typical AFL. Rx or AFL with RVR was diltiazem; repeat ECG showed NSR.     My interpretation of 3/7/22 ECG is NSR    Procedure(s) (LRB):  Ablation, Atrial Flutter, Typical (N/A)  ABLATION, SVT, ACCESSORY PATHWAY (N/A)     Indwelling Lines/Drains at time of discharge:  Lines/Drains/Airways     None                 Hospital Course (synopsis of major diagnoses, care, treatment, and services provided during the course of the hospital stay): Patient is a 43 yo M presented fasting to EP lab for AFL vs SVT EPS+ RFA. Found to have typical atrial flutter. RFA to CTI with biderectional block. Bed rest for 4 hours and then ambulated and was  discharged.         Pending Diagnostic Studies:     Procedure Component Value Units Date/Time    EKG 12-lead [[id number]]     Order Status: Sent Lab Status: No result     Transesophageal echo (RAFAEL) [[id number]]     Order Status: Sent Lab Status: No result           There are no hospital problems to display for this patient.      Discharged Condition: good    Follow Up:   Follow-up Information     Carlos Daugherty MD Follow up in 3 month(s).    Specialties: Electrophysiology, Cardiology  Contact information:  [address] [place] [address] [id number] [phone]                       Patient Instructions:   No discharge procedures on file.  Medications:  Reconciled Home Medications:      Medication List      START taking these medications    aspirin 81 MG Chew  Take 1 tablet (81 mg total) by mouth once daily.        CONTINUE taking these medications    cetirizine 5 MG tablet  Commonly known as: ZYRTEC  Take 5 mg by mouth once daily.     * EScitalopram oxalate 20 MG tablet  Commonly known as: LEXAPRO  Take 1 tablet (20 mg total) by mouth once daily.     * EScitalopram oxalate 10 MG tablet  Commonly known as: LEXAPRO  Take 1 10 mg tab each pm, in addition to 1 20 mg tab each am. (total of 30 mg daily)     MAGNESIUM ORAL  Take by mouth.     multivitamin capsule  Take 1 capsule by mouth once daily.     VITAMIN D ORAL  Take by mouth.     zinc 50 mg Tab  Take by mouth.         * This list has 2 medication(s) that are the same as other medications prescribed for you. Read the directions carefully, and ask your doctor or other care provider to review them with you.            STOP taking these medications    verapamiL 180 MG C24p  Commonly known as: VERELAN            Time spent on the discharge of patient: 30 minutes    Quique Jauregui MD  Cardiology  [facility] - Cardiology

## 2022-03-18 NOTE — BRIEF OP NOTE
: Dr. Carlos Daugherty    Post-operative Diagnosis: AFlutter    Procedure Performed: Radiofrequency ablation of the CTI.     Description of Procedure: The patient was brought to the EP lab in the fasting state. Prepped and draped in sterile fashion. Safety timeout was performed. Sedation administered by anesthesia staff. Ultrasound guided venous access of the right femoral vein was performed x2 L femoralx3. HALO, his and RV via L femoral access. CS, and ablation catheters placed via right. RFA to the CTI with confirmation of bidirectional block.     EBL: <10 mL    Specimens Removed: None  Complications: no immediate    A/P  Bed rest 4 hours  Asa 81mg      Quique Jauregui MD PGY 7

## 2022-03-18 NOTE — TRANSFER OF CARE
"Anesthesia Transfer of Care Note    Patient: Wilbur Diop    Procedure(s) Performed: Procedure(s) (LRB):  Ablation, Atrial Flutter, Typical (N/A)  ABLATION, SVT, ACCESSORY PATHWAY (N/A)    Patient location: PACU    Anesthesia Type: MAC    Transport from OR: Transported from OR on 6-10 L/min O2 by face mask with adequate spontaneous ventilation    Post pain: adequate analgesia    Post assessment: no apparent anesthetic complications    Post vital signs: stable    Level of consciousness: awake and alert    Nausea/Vomiting: no nausea/vomiting    Complications: none    Transfer of care protocol was followed      Last vitals:   Visit Vitals  /72 (BP Location: Right arm, Patient Position: Lying)   Pulse (!) 55   Temp 36.2 °C (97.2 °F) (Temporal)   Resp 16   Ht 5' 9" (1.753 m)   Wt 104.3 kg (230 lb)   SpO2 99%   BMI 33.97 kg/m²     "

## 2022-03-18 NOTE — ANESTHESIA PREPROCEDURE EVALUATION
2022  Pre-operative evaluation for Procedure(s) (LRB):  Ablation, Atrial Flutter, Typical (N/A)  ABLATION, SVT, ACCESSORY PATHWAY (N/A)  Transesophageal echo (RAFAEL) intra-procedure log documentation (N/A)    Wilbur Diop is a 42 y.o. male here for aflutter ablation    Patient Active Problem List   Diagnosis    Recurrent major depressive disorder, in partial remission    RACHEL (generalized anxiety disorder)    Typical atrial flutter    PSVT (paroxysmal supraventricular tachycardia)       Review of patient's allergies indicates:  No Known Allergies    No current facility-administered medications on file prior to encounter.     Current Outpatient Medications on File Prior to Encounter   Medication Sig Dispense Refill    cetirizine (ZYRTEC) 5 MG tablet Take 5 mg by mouth once daily.      ergocalciferol, vitamin D2, (VITAMIN D ORAL) Take by mouth.      EScitalopram oxalate (LEXAPRO) 10 MG tablet Take 1 10 mg tab each pm, in addition to 1 20 mg tab each am. (total of 30 mg daily) (Patient not taking: No sig reported) 90 tablet 3    EScitalopram oxalate (LEXAPRO) 20 MG tablet Take 1 tablet (20 mg total) by mouth once daily. 90 tablet 1    MAGNESIUM ORAL Take by mouth.      multivitamin capsule Take 1 capsule by mouth once daily.      zinc 50 mg Tab Take by mouth.         Past Surgical History:   Procedure Laterality Date    APPENDECTOMY      HERNIA REPAIR         Social History     Socioeconomic History    Marital status:     Number of children: 2   Tobacco Use    Smoking status: Former Smoker     Quit date: 10/22/2004     Years since quittin.4    Smokeless tobacco: Never Used   Substance and Sexual Activity    Alcohol use: Yes     Alcohol/week: 1.7 standard drinks     Types: 2 Standard drinks or equivalent per week     Comment: once a month; socially    Drug use: No    Sexual  activity: Yes     Partners: Female   Other Topics Concern    Patient feels they ought to cut down on drinking/drug use No    Patient annoyed by others criticizing their drinking/drug use No    Patient has felt bad or guilty about drinking/drug use No    Patient has had a drink/used drugs as an eye opener in the AM No               Pre-op Assessment    I have reviewed the Patient Summary Reports.     I have reviewed the Nursing Notes. I have reviewed the NPO Status.      Review of Systems  Anesthesia Hx:  No problems with previous Anesthesia    Cardiovascular:   Dysrhythmias    Pulmonary:  Pulmonary Normal    Hepatic/GI:  Hepatic/GI Normal        Physical Exam  General: Well nourished    Airway:  Mallampati: II   Mouth Opening: Normal  Tongue: Normal    Chest/Lungs:  Normal Respiratory Rate    Heart:  Rate: Normal        Anesthesia Plan  Type of Anesthesia, risks & benefits discussed:    Anesthesia Type: Gen Natural Airway  Intra-op Monitoring Plan: Standard ASA Monitors  Post Op Pain Control Plan: multimodal analgesia  Informed Consent: Informed consent signed with the Patient and all parties understand the risks and agree with anesthesia plan.  All questions answered.   ASA Score: 3    Ready For Surgery From Anesthesia Perspective.     .

## 2022-03-18 NOTE — Clinical Note
The groin was prepped. The site was prepped with ChloraPrep. The site was clipped. The patient was draped. The patient was positioned supine. The patient was secured using safety straps and to an armboard.

## 2022-03-18 NOTE — Clinical Note
dry, intact, no bleeding and no hematoma. Manual pressure to groins for 10 mins, hemostasis achieved, guaze and tegaderm dressing applied

## 2022-03-19 NOTE — PROGRESS NOTES
Patient discharged at this time. Patient wheeled out by RN. Patients wife at round about for pickup.

## 2022-03-19 NOTE — PROGRESS NOTES
Patient off bedrest at this time. Patient ambulated around unit with standby assistance. Groin sites checked before and after ambulation; no changes. Dressing CDI. VSS. All discharge instructions discussed. No further questions at this time. Will move forward with discharge.

## 2022-03-21 ENCOUNTER — TELEPHONE (OUTPATIENT)
Dept: ELECTROPHYSIOLOGY | Facility: CLINIC | Age: 42
End: 2022-03-21
Payer: OTHER GOVERNMENT

## 2022-03-21 NOTE — TELEPHONE ENCOUNTER
Pt reports constipation since ablation procedure on Friday, passing gas, reports had a small BM on Saturday but today lots of pressure in anal area, spoke with Dr. Daugherty instructed pt to try taking dulcolax OTC today

## 2022-03-21 NOTE — TELEPHONE ENCOUNTER
----- Message from Tay Alvarez MA sent at 3/21/2022 11:45 AM CDT -----  Regarding: FW: post procedure question    ----- Message -----  From: Courtney Orozco  Sent: 3/21/2022  11:33 AM CDT  To: [facility] Staff  Subject: post procedure question                          The pt is calling having a post procedure question about his BM. Please call him back @ [phone]. ThanksCourtney   he feels like he is constipated and something is coming out but he has a lot of pressure

## 2022-03-22 ENCOUNTER — PATIENT MESSAGE (OUTPATIENT)
Dept: PSYCHIATRY | Facility: CLINIC | Age: 42
End: 2022-03-22
Payer: OTHER GOVERNMENT

## 2022-03-22 NOTE — PROGRESS NOTES
"03/22/2022: Patient had a cardiac ablation for atrial flutter and that is now better. He feels that has been successful. Patient still feels his mood is fluctuating and he is having good days alternating with bad days. At times he feels like he does not want to do anything. He feels he "shuts down" at various intervals, which he feels is becoming more often and finds this now occurring weekly rather than monthly. Patient now feels the Lexapro is not helping. Patient plans to start exercising again after this week. Patient took Wellbutrin in the past and he is not sure how much that helped in the past. We also discussed the use of Prozac in the past, or restarting Buspar with the Lexapro, or adding Wellbutrin or a mood stabilizer like Lamictal or increasing Lexapro to 40 mg which he took in the past with benefit. I advised that it would be safer to change meds or a dose when we are sure his heart rate is stable after exercise. Patient agrees to call again early next week re a med change.    "

## 2022-03-23 NOTE — ANESTHESIA RELEASE NOTE
"Anesthesia Release from PACU Note    Patient: Wilbur Diop    Procedure(s) Performed: Procedure(s) (LRB):  Ablation, Atrial Flutter, Typical (N/A)  ABLATION, SVT, ACCESSORY PATHWAY (N/A)    Anesthesia type: MAC    Post pain: Adequate analgesia    Post assessment: no apparent anesthetic complications and tolerated procedure well    Last Vitals:   Visit Vitals  /65 (BP Location: Right arm, Patient Position: Lying)   Pulse (!) 58   Temp 36.5 °C (97.7 °F) (Temporal)   Resp 18   Ht 5' 9" (1.753 m)   Wt 104.3 kg (230 lb)   SpO2 95%   BMI 33.97 kg/m²       Post vital signs: stable    Level of consciousness: awake and alert     Nausea/Vomiting: no nausea/no vomiting    Complications: none    Airway Patency: patent    Respiratory: unassisted, spontaneous ventilation, room air    Cardiovascular: stable and blood pressure at baseline    Hydration: euvolemic  "

## 2022-03-23 NOTE — ANESTHESIA POSTPROCEDURE EVALUATION
Anesthesia Post Evaluation    Patient: Wilbur Diop    Procedure(s) Performed: Procedure(s) (LRB):  Ablation, Atrial Flutter, Typical (N/A)  ABLATION, SVT, ACCESSORY PATHWAY (N/A)    Final Anesthesia Type: MAC      Patient location during evaluation: PACU  Patient participation: Yes- Able to Participate  Level of consciousness: awake and alert  Post-procedure vital signs: reviewed and stable  Pain management: adequate  Airway patency: patent    PONV status at discharge: No PONV  Anesthetic complications: no      Cardiovascular status: hemodynamically stable  Respiratory status: unassisted, spontaneous ventilation and room air  Hydration status: euvolemic  Follow-up not needed.          Vitals Value Taken Time   /65 03/18/22 2130   Temp 36.5 °C (97.7 °F) 03/18/22 2000   Pulse 58 03/18/22 2130   Resp 18 03/18/22 2130   SpO2 95 % 03/18/22 2130         Event Time   Out of Recovery 18:15:00         Pain/Austin Score: No data recorded

## 2022-03-25 ENCOUNTER — TELEPHONE (OUTPATIENT)
Dept: ELECTROPHYSIOLOGY | Facility: CLINIC | Age: 42
End: 2022-03-25
Payer: OTHER GOVERNMENT

## 2022-03-25 NOTE — TELEPHONE ENCOUNTER
Spoke to: Wilbur Diop    First couple of days IV site was sore.  This has resolved. Constipation was the worst.  He called and spoke with the nurse.  Took stool softener for several days and this has helped.   Denies SOB or CP.   Feels heart rate regular.      Does the patient have any concerns, questions about post op instructions? No     Does the patient have any concerns, questions about wound site?No  Denies swelling and redness. No lumps. Mild bruising.     Has the patient resumed medications and/or picked up new prescriptions ordered at discharge?yes    Does the patient have any other questions regarding their procedure? No       Patient verbalized understanding of his instructions and appreciated the call.

## 2022-04-11 ENCOUNTER — TELEPHONE (OUTPATIENT)
Dept: ELECTROPHYSIOLOGY | Facility: CLINIC | Age: 42
End: 2022-04-11

## 2022-04-11 NOTE — TELEPHONE ENCOUNTER
----- Message from Tay Alvarez MA sent at 4/11/2022  2:59 PM CDT -----  Regarding: FW: Post Op    ----- Message -----  From: Manfred Luna  Sent: 4/11/2022   2:45 PM CDT  To: [facility] Staff  Subject: Post Op                                          PT would like to discuss Post-Op  instructions.  PT can be reached @ [phone]

## 2022-04-11 NOTE — TELEPHONE ENCOUNTER
"Pt reports that he started to return to exercise yesterday since his CTI RFA on 3/18, exercised on the elliptical bike for about 10 mins, then was active in yard, started to have a "sharp shooting pain" to left chest, denies palpitations, pressure, SOB, or any other symptoms, reports it only lasted a couple of seconds each time, occurred a few times yesterday and a few times today, did not exercise today, clinic f/u 6/20/22, pt does not wear any type of heart monitoring device, reports he has felt his rhythm has been regular since his procedure, sent message to Dr Winter (Dr Daugherty out of office)    "

## 2022-04-12 ENCOUNTER — TELEPHONE (OUTPATIENT)
Dept: ELECTROPHYSIOLOGY | Facility: CLINIC | Age: 42
End: 2022-04-12

## 2022-04-12 NOTE — TELEPHONE ENCOUNTER
See previous phone message from yesterday, informed pt of Dr Castillo response, pt reports he has not had it again since he called, instructed to avoid strenuous exercise for next couple of days and see his PCP if it returns

## 2022-04-12 NOTE — TELEPHONE ENCOUNTER
"----- Message from Javon Winter MD sent at 4/11/2022  5:38 PM CDT -----  This would be unusual to be related to the ablation, nearly 4 weeks ago. More likely pleuritic (lung lining ) pain or a muscle pull in his chest wall.   ----- Message -----  From: Kate Woodruff RN  Sent: 4/11/2022   3:30 PM CDT  To: MD Dr. Dat Samano is out on vacation--    Pt is s/p RFA to CTI on 3/18/22. Started back exercising yesterday (10 mins on elliptical bike) then was active in his yard all day, started to have this "sharp shooting pain" to left chest area, each time only lasting a couple of seconds, happened a few times yesterday and a few times today (did not exercise today), denies palpitations, pressure, pain, reports heart rhythm feels regular since procedure, denies any other symptoms. Clinic follow up is not until 6/20. Is it normal to have this that far out?  Any recommendations or cause for concern?    Thanks,   Kate             "

## 2022-04-20 ENCOUNTER — OFFICE VISIT (OUTPATIENT)
Dept: PSYCHIATRY | Facility: CLINIC | Age: 42
End: 2022-04-20

## 2022-04-20 DIAGNOSIS — F33.41 RECURRENT MAJOR DEPRESSIVE DISORDER, IN PARTIAL REMISSION: Primary | ICD-10-CM

## 2022-04-20 PROCEDURE — 90833 PSYTX W PT W E/M 30 MIN: CPT | Mod: 95,,, | Performed by: PSYCHIATRY & NEUROLOGY

## 2022-04-20 PROCEDURE — 99214 OFFICE O/P EST MOD 30 MIN: CPT | Mod: 95,,, | Performed by: PSYCHIATRY & NEUROLOGY

## 2022-04-20 PROCEDURE — 99214 PR OFFICE/OUTPT VISIT, EST, LEVL IV, 30-39 MIN: ICD-10-PCS | Mod: 95,,, | Performed by: PSYCHIATRY & NEUROLOGY

## 2022-04-20 PROCEDURE — 90833 PR PSYCHOTHERAPY W/PATIENT W/E&M, 30 MIN (ADD ON): ICD-10-PCS | Mod: 95,,, | Performed by: PSYCHIATRY & NEUROLOGY

## 2022-04-20 RX ORDER — FLUOXETINE 10 MG/1
20 CAPSULE ORAL DAILY
Qty: 60 CAPSULE | Refills: 5 | Status: SHIPPED | OUTPATIENT
Start: 2022-04-20 | End: 2022-05-12

## 2022-04-20 NOTE — PROGRESS NOTES
"Outpatient Psychiatry Follow-Up Visit (MD/NP)    4/20/2022Patient tried to get into virtual system but did not have wifi to connect him to the call so we had to do visit by phone.(32" with 12" on meds and 20" for supportive counseling and update of history and mental status)    Clinical Status of Patient:  Outpatient (Ambulatory)    Chief Complaint:  Wilbur Diop is a 42 y.o. male who presents today for follow-up of depression.  Met with patient and no relatives present for interview.      Interval History and Content of Current Session:  Interim Events/Subjective Report/Content of Current Session: Patient is no longer having atrial flutter and he feels his heart is stable. Patient continues to be depressed. Patient is anxious, bitter, easily angered. Patient denies plans of harming himself nor signs of ranjan or psychosis. Patient is easily upset. Patient finds that the Lexapro is no longer working for him. Patient feels very frustrated. Patient is having obsessive negative thinking each day. Patient has an uncle that is responding to Prozac. Patient also describes excess worrying and worsening sense of insecurity. Patient feels like he is in a "dark hole"as well. Patient is following a healthy diet. Patient does sleep well. Patient has a limited sense of enjoyment. We again discussed my USP. We agreed to taper patient off of Lexapro and gradually start Prozac. Patient was given a schedule to make this transition.    Psychotherapy:  · Target symptoms: depression  · Why chosen therapy is appropriate versus another modality: relevant to diagnosis, patient responds to this modality, evidence based practice  · Outcome monitoring methods: self-report  · Therapeutic intervention type: supportive psychotherapy  · Topics discussed/themes: worsening depression  · The patient's response to the intervention is accepting. The patient's progress toward treatment goals is not progressing.   · Duration of intervention: 32 " minutes.    Review of Systems   · PSYCHIATRIC: Pertinant items are noted in the narrative.    Past Medical, Family and Social History: The patient's past medical, family and social history have been reviewed and updated as appropriate within the electronic medical record - see encounter notes.    Compliance: yes    Side effects: decreased sex drive    Risk Parameters:  Patient reports no suicidal ideation  Patient reports no homicidal ideation  Patient reports no self-injurious behavior  Patient reports no violent behavior    Exam (detailed: at least 9 elements; comprehensive: all 15 elements)   Constitutional  Vitals:  Most recent vital signs, dated less than 90 days prior to this appointment, were reviewed.   There were no vitals filed for this visit.Patient reports stable vital signs     General:  could not assess appearance     Musculoskeletal  Muscle Strength/Tone:  patient reports adequate muscle tone and strength   Gait & Station:  non-ataxic     Psychiatric  Speech:  no latency; no press, spontaneous   Mood & Affect:  anxious, depressed, irritable  congruent and appropriate   Thought Process:  normal and logical   Associations:  intact   Thought Content:  normal, no suicidality, no homicidality, delusions, or paranoia   Insight:  has awareness of illness   Judgement: behavior is adequate to circumstances   Orientation:  grossly intact   Memory: intact for content of interview   Language: grossly intact   Attention Span & Concentration:  able to focus   Fund of Knowledge:  not tested     Assessment and Diagnosis   Status/Progress: Based on the examination today, the patient's problem(s) is/are inadequately controlled.  New problems have not been presented today.   worsening depression are complicating management of the primary condition.  The working differential for this patient includes recurrent depression.     General Impression: Patient remains in a significant depression with the symptoms described  above. Patient is not an active danger to self or others at this time despite the depression.      ICD-10-CM ICD-9-CM   1. Recurrent major depressive disorder, in partial remission  F33.41 296.35       Intervention/Counseling/Treatment Plan   Medication Management: The risks and benefits of medication were discussed with the patient. Patient agrees to taper off of the Lexapro and transition to 20 mg daily of Prozac and was given a schedule to do so. Patient also agreed to call next week on his status.    Return to Clinic: 3 months

## 2022-04-28 ENCOUNTER — PATIENT MESSAGE (OUTPATIENT)
Dept: PSYCHIATRY | Facility: CLINIC | Age: 42
End: 2022-04-28

## 2022-04-28 NOTE — PROGRESS NOTES
04/28/2022: Patient is better focused now and his mood is more hopeful. Patient is working on his reduction of negative thoughts at this time. Patient has no thoughts of harming himself and is more positive. Patient is completing tapering off of the Lexapro and onto Prozac. Patient agrees to call again in a week re his status and progress.

## 2022-05-11 ENCOUNTER — PATIENT MESSAGE (OUTPATIENT)
Dept: PSYCHIATRY | Facility: CLINIC | Age: 42
End: 2022-05-11

## 2022-05-11 NOTE — PROGRESS NOTES
"05/11/2022: Patient called to say he is not doing well. He is off of the Lexapro and now taking 20 mg of Prozac daily. Patient reports no side effects but he is sad and tearful, not sleeping well, sweating more, and feels "lost". Patient has no active thoughts of harming himself. Patient agreed to increase the Prozac to 30 mg daily, and to call me again this coming Monday 05/16 for an update on his status or earlier if necessary. I offered encouragement due to fact that the Prozac was just started recently and may take more time to be effective. Patient understood and agreed with the current plan.    "

## 2022-05-12 RX ORDER — SERTRALINE HYDROCHLORIDE 50 MG/1
50 TABLET, FILM COATED ORAL DAILY
Qty: 30 TABLET | Refills: 5 | Status: SHIPPED | OUTPATIENT
Start: 2022-05-12 | End: 2022-05-23 | Stop reason: DRUGHIGH

## 2022-05-12 NOTE — PROGRESS NOTES
05/12/22: Patient called with desire to stop Prozac, but found that his uncle does well on the Zoloft and patient agreed to start this medicine tomorrow am at dose of 50 mg q am. I reviewed the side effects of this medicine and patient agreed to call me his initial response to this medication.

## 2022-05-23 ENCOUNTER — PATIENT MESSAGE (OUTPATIENT)
Dept: PSYCHIATRY | Facility: CLINIC | Age: 42
End: 2022-05-23

## 2022-05-23 RX ORDER — SERTRALINE HYDROCHLORIDE 100 MG/1
100 TABLET, FILM COATED ORAL DAILY
Qty: 30 TABLET | Refills: 5 | Status: SHIPPED | OUTPATIENT
Start: 2022-05-23 | End: 2022-06-06 | Stop reason: DRUGHIGH

## 2022-05-23 NOTE — PROGRESS NOTES
05/23/2022: Patient states he has having crying episodes in the am. Patient also reports some trouble sleeping as well. Patient notices also a reduction in appetite. Patient also had a meeting with his therapist this past Saturday. Patient is still ruminating re his problems and patient is easily overwhelmed re his concerns and also financial problems. Patient finds his thinking cloudy as well. Patient is more able to get himself up and going in the am's. Patient has no active intent to harm himself and agrees to increase the Zoloft to 100 mg q am, and call next week for an update on his status.

## 2022-05-25 ENCOUNTER — OFFICE VISIT (OUTPATIENT)
Dept: INTERNAL MEDICINE | Facility: CLINIC | Age: 42
End: 2022-05-25

## 2022-05-25 VITALS
HEART RATE: 60 BPM | WEIGHT: 224 LBS | OXYGEN SATURATION: 98 % | HEIGHT: 69 IN | DIASTOLIC BLOOD PRESSURE: 70 MMHG | SYSTOLIC BLOOD PRESSURE: 110 MMHG | BODY MASS INDEX: 33.18 KG/M2

## 2022-05-25 DIAGNOSIS — Z00.00 ANNUAL PHYSICAL EXAM: ICD-10-CM

## 2022-05-25 DIAGNOSIS — F33.41 RECURRENT MAJOR DEPRESSIVE DISORDER, IN PARTIAL REMISSION: ICD-10-CM

## 2022-05-25 DIAGNOSIS — J06.9 URTI (ACUTE UPPER RESPIRATORY INFECTION): ICD-10-CM

## 2022-05-25 DIAGNOSIS — F41.1 GAD (GENERALIZED ANXIETY DISORDER): ICD-10-CM

## 2022-05-25 DIAGNOSIS — R73.01 IMPAIRED FASTING GLUCOSE: ICD-10-CM

## 2022-05-25 DIAGNOSIS — I48.3 TYPICAL ATRIAL FLUTTER: Primary | ICD-10-CM

## 2022-05-25 PROCEDURE — 99214 OFFICE O/P EST MOD 30 MIN: CPT | Mod: S$PBB,,, | Performed by: INTERNAL MEDICINE

## 2022-05-25 PROCEDURE — 99999 PR PBB SHADOW E&M-EST. PATIENT-LVL IV: ICD-10-PCS | Mod: PBBFAC,,, | Performed by: INTERNAL MEDICINE

## 2022-05-25 PROCEDURE — 99999 PR PBB SHADOW E&M-EST. PATIENT-LVL IV: CPT | Mod: PBBFAC,,, | Performed by: INTERNAL MEDICINE

## 2022-05-25 PROCEDURE — 99214 OFFICE O/P EST MOD 30 MIN: CPT | Mod: PBBFAC | Performed by: INTERNAL MEDICINE

## 2022-05-25 PROCEDURE — 99214 PR OFFICE/OUTPT VISIT, EST, LEVL IV, 30-39 MIN: ICD-10-PCS | Mod: S$PBB,,, | Performed by: INTERNAL MEDICINE

## 2022-05-25 RX ORDER — AMOXICILLIN AND CLAVULANATE POTASSIUM 875; 125 MG/1; MG/1
1 TABLET, FILM COATED ORAL EVERY 12 HOURS
Qty: 20 TABLET | Refills: 0 | OUTPATIENT
Start: 2022-05-25 | End: 2022-09-27

## 2022-05-25 RX ORDER — METHYLPREDNISOLONE 4 MG/1
TABLET ORAL
Qty: 21 EACH | Refills: 0 | Status: SHIPPED | OUTPATIENT
Start: 2022-05-25 | End: 2022-06-15

## 2022-05-25 NOTE — PROGRESS NOTES
[facility] Note    Chief Complaint      Chief Complaint   Patient presents with    Follow-up     ER visit in March    Headache    Sore Throat       History of Present Illness      Wilbur Diop is a 42 y.o. male with chronic conditions of SVT, atrial flutter, anxiety, ADD  who presents today for: ER follow up.  Presented to hospital on 2/11/2022 for racing heart rate for 2 days, diagnosed with SVT.  Review of EKG at subsequent visit with EP interpreted as atrial flutter with 2:1 conduction.  Had RFA on 3/18/2022.  EP follow up scheduled 6/20/2022.  Complains of pain, headache, sinus congestion  Anxiety, depression:  Sees Dr. Malin.  Controlled on sertraline and buspirone.  ADD:  Sees Dr. Malin  Controlled on Adderall XR, started back yesterday after being off for a while.  Diet:  Prepares own food daily.  Limiting fatty foods and carbs.  Drinks plenty water.  Exercise:  Stays active. Needs to get back into routine workouts.  Discussed jogging and home workouts.  Denies drinking and driving, drinking more than 4 drinks on occasion, drug use.     Flu shot declines.  Tdap 2016.  Shingrix due at age 48.  Pneumonia vaccine due at age 63.  C scope due at age 43.  PSA due at age 43.    Past Medical History:  Past Medical History:   Diagnosis Date    ADHD (attention deficit hyperactivity disorder)     Allergy     Anxiety     Atrial flutter     Depression     History of psychiatric hospitalization     2002 for depression    Hx of psychiatric care     Psychiatric problem     Supraventricular tachycardia     Therapy        Past Surgical History:   has a past surgical history that includes Appendectomy and Hernia repair.    Family History:  family history includes Anxiety disorder in his mother; Cancer in his maternal grandfather; Depression in his mother; No Known Problems in his brother, father, maternal aunt, maternal grandmother, maternal uncle, paternal aunt, paternal grandfather,  paternal grandmother, paternal uncle, and sister.     Social History:  Social History     Tobacco Use    Smoking status: Former Smoker     Quit date: 10/22/2004     Years since quittin.6    Smokeless tobacco: Never Used   Substance Use Topics    Alcohol use: Yes     Alcohol/week: 1.7 standard drinks     Types: 2 Standard drinks or equivalent per week     Comment: once a month; socially    Drug use: No       I personally reviewed all past medical, surgical, social and family history.    Review of Systems   Constitutional: Negative for chills, fever and malaise/fatigue.   Respiratory: Negative for shortness of breath.    Cardiovascular: Negative for chest pain.   Gastrointestinal: Negative for constipation, diarrhea, nausea and vomiting.   Skin: Negative for rash.   Neurological: Negative for weakness.   All other systems reviewed and are negative.       Medications:  Outpatient Encounter Medications as of 2022   Medication Sig Dispense Refill    amoxicillin-clavulanate 875-125mg (AUGMENTIN) 875-125 mg per tablet Take 1 tablet by mouth every 12 (twelve) hours. 20 tablet 0    aspirin 81 MG Chew Take 1 tablet (81 mg total) by mouth once daily. 30 tablet 0    cetirizine (ZYRTEC) 5 MG tablet Take 5 mg by mouth once daily.      ergocalciferol, vitamin D2, (VITAMIN D ORAL) Take by mouth.      MAGNESIUM ORAL Take by mouth.      methylPREDNISolone (MEDROL DOSEPACK) 4 mg tablet use as directed 21 each 0    multivitamin capsule Take 1 capsule by mouth once daily.      sertraline (ZOLOFT) 100 MG tablet Take 1 tablet (100 mg total) by mouth once daily. 30 tablet 5    zinc 50 mg Tab Take by mouth.      [DISCONTINUED] verapamiL (VERELAN) 180 MG C24P Take 1 capsule (180 mg total) by mouth once daily. 90 capsule 3     No facility-administered encounter medications on file as of 2022.       Allergies:  Review of patient's allergies indicates:  No Known Allergies    Health Maintenance:  Immunization History  "  Administered Date(s) Administered    Influenza - Intradermal - Quadrivalent - PF 10/22/2014    Influenza - Intradermal - Trivalent - PF 10/22/2014    Influenza - Quadrivalent 10/19/2015    Influenza - Quadrivalent - PF *Preferred* (6 months and older) 01/30/2019    Tdap 11/07/2016      Health Maintenance   Topic Date Due    Lipid Panel  01/30/2024    TETANUS VACCINE  11/07/2026    Hepatitis C Screening  Completed        Physical Exam      Vital Signs  Pulse: 60  SpO2: 98 %  BP: 110/70  BP Location: Left arm  Patient Position: Sitting  Pain Score: 0-No pain  Height and Weight  Height: 5' 9" (175.3 cm)  Weight: 101.6 kg (223 lb 15.8 oz)  BSA (Calculated - sq m): 2.22 sq meters  BMI (Calculated): 33.1  Weight in (lb) to have BMI = 25: 168.9]    Physical Exam  Vitals reviewed.   Constitutional:       Appearance: He is well-developed.   HENT:      Head: Normocephalic and atraumatic.      Right Ear: External ear normal.      Left Ear: External ear normal.   Cardiovascular:      Rate and Rhythm: Normal rate and regular rhythm.      Heart sounds: Normal heart sounds. No murmur heard.  Pulmonary:      Effort: Pulmonary effort is normal.      Breath sounds: Normal breath sounds. No wheezing or rales.   Abdominal:      General: Bowel sounds are normal.      Palpations: Abdomen is soft.          Laboratory:  CBC:  Recent Labs   Lab 02/11/22 2248 02/23/22 2001 03/07/22  1413   WBC 14.80 H 11.53 10.12   RBC 5.34 5.26 5.24   Hemoglobin 15.9 15.7 15.7   Hematocrit 47.7 48.1 45.9   Platelets 336 302 296   MCV 89 91 88   MCH 29.8 29.8 30.0   MCHC 33.3 32.6 34.2     CMP:  Recent Labs   Lab 11/12/19  1525 02/11/22 2248 02/23/22 2001 03/07/22  1413   Glucose 92 164 H 82 112 H   Calcium 10.4 9.5 9.5 9.7   Albumin 4.7 4.0 4.0  --    Total Protein 8.4 7.6 7.5  --    Sodium 140 141 140 140   Potassium 4.5 3.5 3.8 3.7   CO2 26 22 L 21 L 24   Chloride 101 104 106 104   BUN 18 16 21 H 17   Alkaline Phosphatase 62 67 67  --    ALT " 37 45 H 30  --    AST 24 33 23  --    Total Bilirubin 0.7 0.6 0.4  --      URINALYSIS:  Recent Labs   Lab 11/12/19  1526   Color, UA Yellow   Specific Gravity, UA 1.020   pH, UA 7.0   Protein, UA Negative   Nitrite, UA Negative   Leukocytes, UA Negative   Urobilinogen, UA Negative      LIPIDS:  Recent Labs   Lab 11/12/19  1525 02/11/22  2248   TSH 0.917 1.821     TSH:  Recent Labs   Lab 11/12/19  1525 02/11/22  2248   TSH 0.917 1.821     A1C:  Recent Labs   Lab 11/13/19  0639   Hemoglobin A1C 5.5       Assessment/Plan     Wilbur Diop is a 42 y.o.male with:    1. Typical atrial flutter  Continue current meds.  F/U with cardiology  2. Recurrent major depressive disorder, in partial remission  3. RACHEL (generalized anxiety disorder)  Continue current meds.  F/U with psych  4. URTI (acute upper respiratory infection)  - methylPREDNISolone (MEDROL DOSEPACK) 4 mg tablet; use as directed  Dispense: 21 each; Refill: 0  - amoxicillin-clavulanate 875-125mg (AUGMENTIN) 875-125 mg per tablet; Take 1 tablet by mouth every 12 (twelve) hours.  Dispense: 20 tablet; Refill: 0  Recommend to take home COVID swab.  If negative, can start steroid and augmentin.  Cont OTC>  5. Annual physical exam  - Comprehensive Metabolic Panel; Future  - Hemoglobin A1C; Future  - Lipid Panel; Future    6. Impaired fasting glucose  - Hemoglobin A1C; Future       Chronic conditions status updated as per HPI.  Other than changes above, cont current medications and maintain follow up with specialists.  Follow up in about 1 year (around 5/25/2023) for Annual preventative visit.    Future Appointments   Date Time Provider Department Center   5/27/2022 10:15 AM LAB, [facility]   6/20/2022  8:30 AM EKG, APPT [facility] EKG Danish girish   6/20/2022  9:00 AM Mago Carreno NP [facility] AR[facility]HYTH [facility]   7/5/2022  8:00 AM Morgan Malin Jr., MD [facility] PSYCH [place] [facility] Naranjo MD  [facility]

## 2022-06-01 NOTE — PROGRESS NOTES
06/01/2022: Patient called to say he is still tearful and easily overwhelmed. He reported no side effects from the 100 mg dose of Zoloft started 9 days ago, and agreed to increase dose to 150 mg q am and call again on 06/08/2022. Patient reports no thoughts of self harm as well.

## 2022-06-06 ENCOUNTER — PATIENT MESSAGE (OUTPATIENT)
Dept: PSYCHIATRY | Facility: CLINIC | Age: 42
End: 2022-06-06

## 2022-06-06 RX ORDER — SERTRALINE HYDROCHLORIDE 100 MG/1
150 TABLET, FILM COATED ORAL DAILY
Qty: 45 TABLET | Refills: 5 | OUTPATIENT
Start: 2022-06-06 | End: 2022-09-27

## 2022-06-06 RX ORDER — BUSPIRONE HYDROCHLORIDE 10 MG/1
10 TABLET ORAL 2 TIMES DAILY
Qty: 60 TABLET | Refills: 5 | Status: SHIPPED | OUTPATIENT
Start: 2022-06-06 | End: 2022-07-05 | Stop reason: DRUGHIGH

## 2022-06-06 NOTE — PROGRESS NOTES
06/06/2022: Patient is still having crying spells since we increased his Zoloft to 150 mg daily. Patient is still very anxious during the day and often when he awakens in the am and is trying to start his day. Patient agreed to start Buspar 10 mg bid to help with his anxiety and possibly enhance his response to the Zoloft. We also disucussed his mood swings and the consideration of adding Lamictal for his depression and possible mood swings as well. Patient took depakote years ago and could not remember how he responded. He had some concerns he could be bipolar due to his mood shifts from feeling on top of the world to very down and depressed. Patient has no thoughts of harming himself at this time, and agreed to call again in 3 days re his status.

## 2022-06-14 NOTE — PROGRESS NOTES
06/14/2022: Patient states he has begun to have a few good days. His appetite is still reduced during the daytime. Patient sounds more hopeful and upbeat at this time. Patient has no thoughts of harming himself and is in good self control at this time. Patient denies side effects of any kind at this time. Patient agrees to continue Zoloft 150 mg daily and Buspar 10 mg bid and to call again in a week for an update on his status.

## 2022-06-17 PROBLEM — Z98.890 STATUS POST CATHETER ABLATION OF ATRIAL FLUTTER: Status: ACTIVE | Noted: 2022-06-17

## 2022-06-22 ENCOUNTER — PATIENT MESSAGE (OUTPATIENT)
Dept: ELECTROPHYSIOLOGY | Facility: CLINIC | Age: 42
End: 2022-06-22

## 2022-06-22 ENCOUNTER — PATIENT MESSAGE (OUTPATIENT)
Dept: PSYCHIATRY | Facility: CLINIC | Age: 42
End: 2022-06-22

## 2022-06-22 NOTE — PROGRESS NOTES
06/22/2022: Patient has had a few bad days but continues with some improvement. His sweating is also improving. Patient still is worrying excessively. I advised that he continue the Zoloft 150 mg daily and increase the Buspar to 10mg tid and call again next week re his progress. Patient agreed to this plan.

## 2022-07-05 ENCOUNTER — OFFICE VISIT (OUTPATIENT)
Dept: PSYCHIATRY | Facility: CLINIC | Age: 42
End: 2022-07-05

## 2022-07-05 DIAGNOSIS — F41.1 GAD (GENERALIZED ANXIETY DISORDER): ICD-10-CM

## 2022-07-05 DIAGNOSIS — F33.41 RECURRENT MAJOR DEPRESSIVE DISORDER, IN PARTIAL REMISSION: Primary | ICD-10-CM

## 2022-07-05 PROCEDURE — 99213 PR OFFICE/OUTPT VISIT, EST, LEVL III, 20-29 MIN: ICD-10-PCS | Mod: 95,,, | Performed by: PSYCHIATRY & NEUROLOGY

## 2022-07-05 PROCEDURE — 90833 PSYTX W PT W E/M 30 MIN: CPT | Mod: 95,,, | Performed by: PSYCHIATRY & NEUROLOGY

## 2022-07-05 PROCEDURE — 99213 OFFICE O/P EST LOW 20 MIN: CPT | Mod: 95,,, | Performed by: PSYCHIATRY & NEUROLOGY

## 2022-07-05 PROCEDURE — 90833 PR PSYCHOTHERAPY W/PATIENT W/E&M, 30 MIN (ADD ON): ICD-10-PCS | Mod: 95,,, | Performed by: PSYCHIATRY & NEUROLOGY

## 2022-07-05 RX ORDER — BUSPIRONE HYDROCHLORIDE 10 MG/1
TABLET ORAL
Qty: 120 TABLET | Refills: 5 | OUTPATIENT
Start: 2022-07-05 | End: 2022-09-27

## 2022-09-13 ENCOUNTER — PATIENT MESSAGE (OUTPATIENT)
Dept: ELECTROPHYSIOLOGY | Facility: CLINIC | Age: 42
End: 2022-09-13
Payer: MEDICAID

## 2022-09-20 ENCOUNTER — TELEPHONE (OUTPATIENT)
Dept: ELECTROPHYSIOLOGY | Facility: CLINIC | Age: 42
End: 2022-09-20
Payer: MEDICAID

## 2022-09-20 DIAGNOSIS — I48.3 TYPICAL ATRIAL FLUTTER: Primary | ICD-10-CM

## 2022-09-20 DIAGNOSIS — I47.10 PSVT (PAROXYSMAL SUPRAVENTRICULAR TACHYCARDIA): ICD-10-CM

## 2022-09-20 NOTE — TELEPHONE ENCOUNTER
Spoke with pt, concerned that holter is not scheduled until 10/12, wants to see if it can be moved up, rescheduled for 9/26, pt informed

## 2022-09-20 NOTE — TELEPHONE ENCOUNTER
----- Message from Carlos Daugherty MD sent at 9/19/2022  6:08 PM CDT -----  Regarding: RE: advice  sure    ----- Message -----  From: Kate Woodruff RN  Sent: 9/19/2022   4:45 PM CDT  To: Carlos Daugherty MD  Subject: FW: advice                                       Pt had AFL ablation 3/18/22, has not been seen in clinic since  Recently asked about starting on Focalin for his ADHD. Took for 2 days, had episodes of heart rates going in the 150's, he stopped the medication on Friday. On Saturday, after having a 5 hour energy, had heart rates in 160s, Sunday heart rates 116max, today heart rates mostly , but did have another episode of rapid heart rates for brief time, heart rates 135-149 with some LH, now is back down in 70s, he is unable to give a b/p reading, he is unsure if rhythm is irregular    He is not going to take the focalin any longer and advised against the 5 hour energy, since he is overdue for f/u, would you recommend 48 hour holter and f/u in clinic?  Thanks,   Kate       ----- Message -----  From: Latoya Casas  Sent: 9/19/2022   4:18 PM CDT  To: Kate Woodruff RN  Subject: advice                                           Pls call pt at [phone].  He states that his hr has been going up 147-167 the past couple of days and feels a little anxious and doesn't know if this could be the cause or not.    Thank you

## 2022-09-20 NOTE — TELEPHONE ENCOUNTER
Informed pt of Dr Daugherty's recommendations for 48 hour holter and clinic follow up, will get scheduled

## 2022-09-26 ENCOUNTER — HOSPITAL ENCOUNTER (OUTPATIENT)
Dept: CARDIOLOGY | Facility: HOSPITAL | Age: 42
Discharge: HOME | End: 2022-09-26
Attending: INTERNAL MEDICINE
Payer: MEDICAID

## 2022-09-26 ENCOUNTER — PATIENT MESSAGE (OUTPATIENT)
Dept: ELECTROPHYSIOLOGY | Facility: CLINIC | Age: 42
End: 2022-09-26
Payer: MEDICAID

## 2022-09-26 DIAGNOSIS — I47.10 PSVT (PAROXYSMAL SUPRAVENTRICULAR TACHYCARDIA): ICD-10-CM

## 2022-09-26 DIAGNOSIS — I48.3 TYPICAL ATRIAL FLUTTER: ICD-10-CM

## 2022-09-26 PROCEDURE — 93227 HOLTER MONITOR - 48 HOUR (CUPID ONLY): ICD-10-PCS | Mod: ,,, | Performed by: INTERNAL MEDICINE

## 2022-09-26 PROCEDURE — 93227 XTRNL ECG REC<48 HR R&I: CPT | Mod: ,,, | Performed by: INTERNAL MEDICINE

## 2022-09-26 PROCEDURE — 93226 XTRNL ECG REC<48 HR SCAN A/R: CPT

## 2022-09-26 NOTE — TELEPHONE ENCOUNTER
Pt was asking about getting on ekg today since he got his holter, will get ekg as previously scheduled on 10/12, has holter on now

## 2022-09-27 ENCOUNTER — PATIENT MESSAGE (OUTPATIENT)
Dept: ELECTROPHYSIOLOGY | Facility: CLINIC | Age: 42
End: 2022-09-27
Payer: MEDICAID

## 2022-09-27 ENCOUNTER — HOSPITAL ENCOUNTER (EMERGENCY)
Facility: HOSPITAL | Age: 42
Discharge: HOME | End: 2022-09-27
Attending: EMERGENCY MEDICINE
Payer: MEDICAID

## 2022-09-27 VITALS
RESPIRATION RATE: 15 BRPM | HEART RATE: 63 BPM | DIASTOLIC BLOOD PRESSURE: 92 MMHG | SYSTOLIC BLOOD PRESSURE: 130 MMHG | HEIGHT: 69 IN | BODY MASS INDEX: 34.07 KG/M2 | WEIGHT: 230 LBS | TEMPERATURE: 98 F | OXYGEN SATURATION: 98 %

## 2022-09-27 DIAGNOSIS — I49.5 SSS (SICK SINUS SYNDROME): Primary | ICD-10-CM

## 2022-09-27 DIAGNOSIS — R00.0 TACHYCARDIA: ICD-10-CM

## 2022-09-27 LAB
ALBUMIN SERPL BCP-MCNC: 4.2 G/DL (ref 3.5–5.2)
ALP SERPL-CCNC: 75 U/L (ref 55–135)
ALT SERPL W/O P-5'-P-CCNC: 27 U/L (ref 10–44)
ANION GAP SERPL CALC-SCNC: 10 MMOL/L (ref 8–16)
AST SERPL-CCNC: 17 U/L (ref 10–40)
BASOPHILS # BLD AUTO: 0.04 K/UL (ref 0–0.2)
BASOPHILS NFR BLD: 0.4 % (ref 0–1.9)
BILIRUB SERPL-MCNC: 0.4 MG/DL (ref 0.1–1)
BUN SERPL-MCNC: 14 MG/DL (ref 6–20)
CALCIUM SERPL-MCNC: 9.6 MG/DL (ref 8.7–10.5)
CHLORIDE SERPL-SCNC: 101 MMOL/L (ref 95–110)
CO2 SERPL-SCNC: 25 MMOL/L (ref 23–29)
CREAT SERPL-MCNC: 0.8 MG/DL (ref 0.5–1.4)
DIFFERENTIAL METHOD: ABNORMAL
EOSINOPHIL # BLD AUTO: 0.2 K/UL (ref 0–0.5)
EOSINOPHIL NFR BLD: 2.2 % (ref 0–8)
ERYTHROCYTE [DISTWIDTH] IN BLOOD BY AUTOMATED COUNT: 12.3 % (ref 11.5–14.5)
EST. GFR  (NO RACE VARIABLE): >60 ML/MIN/1.73 M^2
GLUCOSE SERPL-MCNC: 94 MG/DL (ref 70–110)
HCT VFR BLD AUTO: 46.7 % (ref 40–54)
HCV AB SERPL QL IA: NORMAL
HGB BLD-MCNC: 16 G/DL (ref 14–18)
HIV 1+2 AB+HIV1 P24 AG SERPL QL IA: NORMAL
IMM GRANULOCYTES # BLD AUTO: 0.06 K/UL (ref 0–0.04)
IMM GRANULOCYTES NFR BLD AUTO: 0.6 % (ref 0–0.5)
LYMPHOCYTES # BLD AUTO: 2.1 K/UL (ref 1–4.8)
LYMPHOCYTES NFR BLD: 22.2 % (ref 18–48)
MAGNESIUM SERPL-MCNC: 2.2 MG/DL (ref 1.6–2.6)
MCH RBC QN AUTO: 30.1 PG (ref 27–31)
MCHC RBC AUTO-ENTMCNC: 34.3 G/DL (ref 32–36)
MCV RBC AUTO: 88 FL (ref 82–98)
MONOCYTES # BLD AUTO: 0.9 K/UL (ref 0.3–1)
MONOCYTES NFR BLD: 9.6 % (ref 4–15)
NEUTROPHILS # BLD AUTO: 6.2 K/UL (ref 1.8–7.7)
NEUTROPHILS NFR BLD: 65 % (ref 38–73)
NRBC BLD-RTO: 0 /100 WBC
PLATELET # BLD AUTO: 314 K/UL (ref 150–450)
PMV BLD AUTO: 9.9 FL (ref 9.2–12.9)
POTASSIUM SERPL-SCNC: 4.1 MMOL/L (ref 3.5–5.1)
PROT SERPL-MCNC: 7.8 G/DL (ref 6–8.4)
RBC # BLD AUTO: 5.31 M/UL (ref 4.6–6.2)
SODIUM SERPL-SCNC: 136 MMOL/L (ref 136–145)
TROPONIN I SERPL DL<=0.01 NG/ML-MCNC: <0.006 NG/ML (ref 0–0.03)
TSH SERPL DL<=0.005 MIU/L-ACNC: 1.15 UIU/ML (ref 0.4–4)
WBC # BLD AUTO: 9.52 K/UL (ref 3.9–12.7)

## 2022-09-27 PROCEDURE — 85025 COMPLETE CBC W/AUTO DIFF WBC: CPT | Performed by: EMERGENCY MEDICINE

## 2022-09-27 PROCEDURE — 83735 ASSAY OF MAGNESIUM: CPT | Performed by: EMERGENCY MEDICINE

## 2022-09-27 PROCEDURE — 99284 EMERGENCY DEPT VISIT MOD MDM: CPT | Mod: ,,, | Performed by: EMERGENCY MEDICINE

## 2022-09-27 PROCEDURE — 93010 EKG 12-LEAD: ICD-10-PCS | Mod: ,,, | Performed by: INTERNAL MEDICINE

## 2022-09-27 PROCEDURE — 99284 PR EMERGENCY DEPT VISIT,LEVEL IV: ICD-10-PCS | Mod: ,,, | Performed by: INTERNAL MEDICINE

## 2022-09-27 PROCEDURE — 84443 ASSAY THYROID STIM HORMONE: CPT | Performed by: EMERGENCY MEDICINE

## 2022-09-27 PROCEDURE — 99284 PR EMERGENCY DEPT VISIT,LEVEL IV: ICD-10-PCS | Mod: ,,, | Performed by: EMERGENCY MEDICINE

## 2022-09-27 PROCEDURE — 99284 EMERGENCY DEPT VISIT MOD MDM: CPT | Mod: ,,, | Performed by: INTERNAL MEDICINE

## 2022-09-27 PROCEDURE — 93010 ELECTROCARDIOGRAM REPORT: CPT | Mod: ,,, | Performed by: INTERNAL MEDICINE

## 2022-09-27 PROCEDURE — 86803 HEPATITIS C AB TEST: CPT | Performed by: PHYSICIAN ASSISTANT

## 2022-09-27 PROCEDURE — 80053 COMPREHEN METABOLIC PANEL: CPT | Performed by: EMERGENCY MEDICINE

## 2022-09-27 PROCEDURE — 93005 ELECTROCARDIOGRAM TRACING: CPT

## 2022-09-27 PROCEDURE — 99284 EMERGENCY DEPT VISIT MOD MDM: CPT | Mod: 25

## 2022-09-27 PROCEDURE — 84484 ASSAY OF TROPONIN QUANT: CPT | Performed by: EMERGENCY MEDICINE

## 2022-09-27 PROCEDURE — 87389 HIV-1 AG W/HIV-1&-2 AB AG IA: CPT | Performed by: PHYSICIAN ASSISTANT

## 2022-09-27 RX ORDER — VERAPAMIL HYDROCHLORIDE 120 MG/1
120 TABLET, FILM COATED, EXTENDED RELEASE ORAL NIGHTLY
Qty: 30 TABLET | Refills: 11 | Status: SHIPPED | OUTPATIENT
Start: 2022-09-27 | End: 2023-04-25 | Stop reason: SDUPTHER

## 2022-09-27 RX ORDER — VERAPAMIL HYDROCHLORIDE 120 MG/1
120 TABLET, FILM COATED, EXTENDED RELEASE ORAL NIGHTLY
Qty: 30 TABLET | Refills: 11 | Status: SHIPPED | OUTPATIENT
Start: 2022-09-27 | End: 2022-09-27 | Stop reason: SDUPTHER

## 2022-09-27 NOTE — HPI
42/M with anxiety, depression on meds. 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 Presents to the hospital with complaints of rapid heart rates at 140-150 bpm followed by bradycardia at heart rates of mid 40s bpm. Associated dizziness, lightheadedness, diaphoresis. Lasts for 1-2 minutes until both heart rate normalizes and symptoms resolve. These have been occurring daily, mainly early in the morning, for the last three weeks. Exacerbated by ADHD medications and energy drinks. Currently on Propranolol, Effexor and Vraylar. He was given a 48hr Holter monitor which he is wearing now. He comes to the ER after experiencing 3 of these episodes. No syncope, angina, palpitation, heart failure symptoms.  Labs are unremarkable. ECG shows a NSR with QRS 84ms.

## 2022-09-27 NOTE — ASSESSMENT & PLAN NOTE
42/M with anxiety, depression on meds. 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 Complains of rapid heart rates at 140-150 bpm followed by bradycardia at heart rates of mid 40s bpm. Associated dizziness, lightheadedness, diaphoresis. Lasts for 1-2 minutes until both heart rate normalizes, and symptoms resolve. These have been occurring daily, mainly early in the morning. Has a 48hr Holter monitor which he is wearing now. He comes to the ER after experiencing 3 of these episodes. No syncope, angina, palpitation, heart failure symptoms.  Labs are unremarkable. ECG shows a NSR with QRS 84ms    Plan:  Start Verapamil 120 mg QD starting today  Stop Propranolol  Complete Hotler monitor to increase yield of picking up the rhythm in question  Will call him with results once he has submitted the Holter on 9/28/2022  Can discharge home and continue with his established EP appointment

## 2022-09-27 NOTE — DISCHARGE INSTRUCTIONS
Diagnosis:   1. SSS (sick sinus syndrome)    2. Tachycardia        Tests you had showed:   Labs Reviewed   CBC W/ AUTO DIFFERENTIAL - Abnormal; Notable for the following components:       Result Value    Immature Granulocytes 0.6 (*)     Immature Grans (Abs) 0.06 (*)     All other components within normal limits    Narrative:     Release to patient->Immediate   HIV 1 / 2 ANTIBODY    Narrative:     Release to patient->Immediate   HEPATITIS C ANTIBODY    Narrative:     Release to patient->Immediate   COMPREHENSIVE METABOLIC PANEL    Narrative:     Release to patient->Immediate   TSH    Narrative:     Release to patient->Immediate   TROPONIN I    Narrative:     Release to patient->Immediate   MAGNESIUM    Narrative:     Release to patient->Immediate      No orders to display       Treatments you received were:   Medications - No data to display    Home Care Instructions:  - Medications: Continue taking your home medications as prescribed  -please stop your propanolol and start verapamil    Below is the plan by Cardiology:  Plan:  Start Verapamil 120 mg QD starting today  Stop Propranolol  Complete Hotler monitor to increase yield of picking up the rhythm in question  Will call him with results once he has submitted the Holter on 9/28/2022  Can discharge home and continue with his established EP appointment     Follow-Up Plan:  - Follow-up with: Primary care doctor within 1-2  days  -please follow-up with cardiology  - Additional testing and/or evaluation will be directed by your primary doctor    Return to the Emergency Department for symptoms including but not limited to: worsening symptoms, severe back pain, shortness of breath or chest pain, vomiting with inability to hold down fluids, blood in vomit or poop, fevers greater than 100.4°F, passing out/fainting/unconsciousness, or other concerning symptoms.  If you start feeling weaker and passed out you need to come immediately back to the hospital.    Future  Appointments   Date Time Provider Department Center   10/12/2022  2:30 PM EKG, APPT [facility] EKG Danish girish   10/28/2022  3:40 PM Carlos Daugherty MD [facility] ARRHYTH [place] girish

## 2022-09-27 NOTE — TELEPHONE ENCOUNTER
Spoke with pt, reports he had 2 episodes this morning occurred within few minutes of each other, after bending over to get some ice out of the freezer, felt his hr go up, was 139 per his watch, LH, after sitting rate went down in 50s then back up, 2nd time heart rate went up to 137 then down to 42, was sweating, LH and pale, passed after a minute or 2, reports now heart rate is in 70s and feels fine, reports his psychiatrist started him on a new med vraylar 1.5mg QOD first dose taken today, remains on propanolol and effexor, is still wearing holter monitor, is to turn it in tomorrow, reports he did have another episode yesterday, feels the episodes are happening more frequently, informed pt if episode does not pass after sitting, hr remains elevated for sustained time or if he ever feels he is going to pass out he should go to ER, turn in holter tomorrow so that Dr Daugherty can review rhythms

## 2022-09-27 NOTE — PROVIDER PROGRESS NOTES - EMERGENCY DEPT.
Encounter Date: 9/27/2022    ED Physician Progress Notes         EKG - STEMI Decision  Initial Reading: No STEMI present.  Response: No Action Needed.

## 2022-09-27 NOTE — CONSULTS
[facility] - Emergency Dept  Cardiology  Consult Note    Patient Name: Wilbur Diop  MRN: [id number]  Admission Date: 9/27/2022  Hospital Length of Stay: 0 days  Code Status: Prior   Attending Provider: Nathaniel Uribe MD   Consulting Provider: Doc Orta MD  Primary Care Physician: Ge Naranjo MD  Principal Problem:<principal problem not specified>    Patient information was obtained from patient and ER records.     Inpatient consult to Electrophysiology  Consult performed by: Doc Orta MD  Consult ordered by: Doc Orta MD        Subjective:     Chief Complaint: Tachycardia     HPI:   42/M with anxiety, depression on meds. 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 Presents to the hospital with complaints of rapid heart rates at 140-150 bpm followed by bradycardia at heart rates of mid 40s bpm. Associated dizziness, lightheadedness, diaphoresis. Lasts for 1-2 minutes until both heart rate normalizes and symptoms resolve. These have been occurring daily, mainly early in the morning, for the last three weeks. Exacerbated by ADHD medications and energy drinks. Currently on Propranolol, Effexor and Vraylar. He was given a 48hr Holter monitor which he is wearing now. He comes to the ER after experiencing 3 of these episodes. No syncope, angina, palpitation, heart failure symptoms.  Labs are unremarkable. ECG shows a NSR with QRS 84ms.       Past Medical History:   Diagnosis Date    ADHD (attention deficit hyperactivity disorder)     Allergy     Anxiety     Atrial flutter     Depression     History of psychiatric hospitalization     2002 for depression    Hx of psychiatric care     Psychiatric problem     Supraventricular tachycardia     Therapy        Past Surgical History:   Procedure Laterality Date    APPENDECTOMY      HERNIA REPAIR         Review of patient's allergies indicates:  No Known Allergies    No current facility-administered medications on file prior to encounter.     Current Outpatient  Medications on File Prior to Encounter   Medication Sig    cetirizine (ZYRTEC) 5 MG tablet Take 5 mg by mouth once daily.    ergocalciferol, vitamin D2, (VITAMIN D ORAL) Take by mouth.    MAGNESIUM ORAL Take by mouth.    multivitamin capsule Take 1 capsule by mouth once daily.    zinc 50 mg Tab Take by mouth.    [DISCONTINUED] amoxicillin-clavulanate 875-125mg (AUGMENTIN) 875-125 mg per tablet Take 1 tablet by mouth every 12 (twelve) hours.    [DISCONTINUED] aspirin 81 MG Chew Take 1 tablet (81 mg total) by mouth once daily.    [DISCONTINUED] busPIRone (BUSPAR) 10 MG tablet Take 2 tabs bid.    [DISCONTINUED] sertraline (ZOLOFT) 100 MG tablet Take 1.5 tablets (150 mg total) by mouth once daily.     Family History       Problem Relation (Age of Onset)    Anxiety disorder Mother    Cancer Maternal Grandfather    Depression Mother    No Known Problems Father, Sister, Brother, Maternal Aunt, Maternal Uncle, Paternal Aunt, Paternal Uncle, Maternal Grandmother, Paternal Grandmother, Paternal Grandfather          Tobacco Use    Smoking status: Former     Types: Cigarettes     Quit date: 10/22/2004     Years since quittin.9    Smokeless tobacco: Never   Substance and Sexual Activity    Alcohol use: Yes     Alcohol/week: 1.7 standard drinks     Types: 2 Standard drinks or equivalent per week     Comment: once a month; socially    Drug use: No    Sexual activity: Yes     Partners: Female     Review of Systems   Constitutional: Negative for chills, diaphoresis, fever, malaise/fatigue and night sweats.   HENT:  Negative for congestion, odynophagia, sore throat and stridor.    Eyes:  Negative for blurred vision and double vision.   Cardiovascular:  Negative for chest pain, claudication, irregular heartbeat, leg swelling and orthopnea.   Respiratory:  Negative for cough.    Endocrine: Negative for cold intolerance and heat intolerance.   Hematologic/Lymphatic: Negative for adenopathy.   Skin:  Negative for nail  changes.   Musculoskeletal:  Negative for arthritis, back pain, falls and joint pain.   Gastrointestinal:  Negative for bloating, abdominal pain, change in bowel habit, dysphagia, hematemesis, hematochezia and melena.   Genitourinary:  Negative for bladder incontinence and dysuria.   Neurological:  Negative for dizziness, focal weakness and loss of balance.   Psychiatric/Behavioral:  Negative for altered mental status.    Objective:     Vital Signs (Most Recent):  Temp: 98.2 °F (36.8 °C) (09/27/22 1129)  Pulse: 63 (09/27/22 1239)  Resp: 15 (09/27/22 1239)  BP: (!) 130/92 (09/27/22 1239)  SpO2: 98 % (09/27/22 1239)   Vital Signs (24h Range):  Temp:  [98.2 °F (36.8 °C)] 98.2 °F (36.8 °C)  Pulse:  [63-65] 63  Resp:  [15-18] 15  SpO2:  [98 %] 98 %  BP: (130-153)/(92-95) 130/92     Weight: 104.3 kg (230 lb)  Body mass index is 33.97 kg/m².    SpO2: 98 %       No intake or output data in the 24 hours ending 09/27/22 1527    Lines/Drains/Airways       Peripheral Intravenous Line  Duration                  Peripheral IV - Single Lumen 09/27/22 1203 20 G Right Antecubital <1 day                    Physical Exam  Constitutional:       General: He is not in acute distress.     Appearance: He is not ill-appearing.   Cardiovascular:      Rate and Rhythm: Normal rate and regular rhythm.      Pulses: Normal pulses.      Heart sounds: No murmur heard.  Pulmonary:      Effort: Pulmonary effort is normal. No respiratory distress.      Breath sounds: No wheezing, rhonchi or rales.   Abdominal:      General: Abdomen is flat. Bowel sounds are normal. There is no distension.      Palpations: Abdomen is soft.   Musculoskeletal:         General: No swelling.      Cervical back: Normal range of motion and neck supple.   Skin:     General: Skin is warm and dry.      Capillary Refill: Capillary refill takes less than 2 seconds.      Coloration: Skin is not jaundiced.   Neurological:      General: No focal deficit present.      Mental Status:  He is alert.   Psychiatric:         Mood and Affect: Mood normal.       Assessment and Plan:     PSVT (paroxysmal supraventricular tachycardia)  42/M with anxiety, depression on meds. 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 Complains of rapid heart rates at 140-150 bpm followed by bradycardia at heart rates of mid 40s bpm. Associated dizziness, lightheadedness, diaphoresis. Lasts for 1-2 minutes until both heart rate normalizes, and symptoms resolve. These have been occurring daily, mainly early in the morning. Has a 48hr Holter monitor which he is wearing now. He comes to the ER after experiencing 3 of these episodes. No syncope, angina, palpitation, heart failure symptoms.  Labs are unremarkable. ECG shows a NSR with QRS 84ms    Plan:  Start Verapamil 120 mg QD starting today  Stop Propranolol  Complete Hotler monitor to increase yield of picking up the rhythm in question  Will call him with results once he has submitted the Holter on 9/28/2022  Can discharge home and continue with his established EP appointment             VTE Risk Mitigation (From admission, onward)    None          Thank you for your consult. I will sign off. Please contact us if you have any additional questions.    Doc Orta MD  Cardiology   [facility]

## 2022-09-27 NOTE — SUBJECTIVE & OBJECTIVE
Past Medical History:   Diagnosis Date    ADHD (attention deficit hyperactivity disorder)     Allergy     Anxiety     Atrial flutter     Depression     History of psychiatric hospitalization      for depression    Hx of psychiatric care     Psychiatric problem     Supraventricular tachycardia     Therapy        Past Surgical History:   Procedure Laterality Date    APPENDECTOMY      HERNIA REPAIR         Review of patient's allergies indicates:  No Known Allergies    No current facility-administered medications on file prior to encounter.     Current Outpatient Medications on File Prior to Encounter   Medication Sig    cetirizine (ZYRTEC) 5 MG tablet Take 5 mg by mouth once daily.    ergocalciferol, vitamin D2, (VITAMIN D ORAL) Take by mouth.    MAGNESIUM ORAL Take by mouth.    multivitamin capsule Take 1 capsule by mouth once daily.    zinc 50 mg Tab Take by mouth.    [DISCONTINUED] amoxicillin-clavulanate 875-125mg (AUGMENTIN) 875-125 mg per tablet Take 1 tablet by mouth every 12 (twelve) hours.    [DISCONTINUED] aspirin 81 MG Chew Take 1 tablet (81 mg total) by mouth once daily.    [DISCONTINUED] busPIRone (BUSPAR) 10 MG tablet Take 2 tabs bid.    [DISCONTINUED] sertraline (ZOLOFT) 100 MG tablet Take 1.5 tablets (150 mg total) by mouth once daily.     Family History       Problem Relation (Age of Onset)    Anxiety disorder Mother    Cancer Maternal Grandfather    Depression Mother    No Known Problems Father, Sister, Brother, Maternal Aunt, Maternal Uncle, Paternal Aunt, Paternal Uncle, Maternal Grandmother, Paternal Grandmother, Paternal Grandfather          Tobacco Use    Smoking status: Former     Types: Cigarettes     Quit date: 10/22/2004     Years since quittin.9    Smokeless tobacco: Never   Substance and Sexual Activity    Alcohol use: Yes     Alcohol/week: 1.7 standard drinks     Types: 2 Standard drinks or equivalent per week     Comment: once a month; socially    Drug use: No    Sexual  activity: Yes     Partners: Female     Review of Systems   Constitutional: Negative for chills, diaphoresis, fever, malaise/fatigue and night sweats.   HENT:  Negative for congestion, odynophagia, sore throat and stridor.    Eyes:  Negative for blurred vision and double vision.   Cardiovascular:  Negative for chest pain, claudication, irregular heartbeat, leg swelling and orthopnea.   Respiratory:  Negative for cough.    Endocrine: Negative for cold intolerance and heat intolerance.   Hematologic/Lymphatic: Negative for adenopathy.   Skin:  Negative for nail changes.   Musculoskeletal:  Negative for arthritis, back pain, falls and joint pain.   Gastrointestinal:  Negative for bloating, abdominal pain, change in bowel habit, dysphagia, hematemesis, hematochezia and melena.   Genitourinary:  Negative for bladder incontinence and dysuria.   Neurological:  Negative for dizziness, focal weakness and loss of balance.   Psychiatric/Behavioral:  Negative for altered mental status.    Objective:     Vital Signs (Most Recent):  Temp: 98.2 °F (36.8 °C) (09/27/22 1129)  Pulse: 63 (09/27/22 1239)  Resp: 15 (09/27/22 1239)  BP: (!) 130/92 (09/27/22 1239)  SpO2: 98 % (09/27/22 1239)   Vital Signs (24h Range):  Temp:  [98.2 °F (36.8 °C)] 98.2 °F (36.8 °C)  Pulse:  [63-65] 63  Resp:  [15-18] 15  SpO2:  [98 %] 98 %  BP: (130-153)/(92-95) 130/92     Weight: 104.3 kg (230 lb)  Body mass index is 33.97 kg/m².    SpO2: 98 %       No intake or output data in the 24 hours ending 09/27/22 1527    Lines/Drains/Airways       Peripheral Intravenous Line  Duration                  Peripheral IV - Single Lumen 09/27/22 1203 20 G Right Antecubital <1 day                    Physical Exam  Constitutional:       General: He is not in acute distress.     Appearance: He is not ill-appearing.   Cardiovascular:      Rate and Rhythm: Normal rate and regular rhythm.      Pulses: Normal pulses.      Heart sounds: No murmur heard.  Pulmonary:      Effort:  Pulmonary effort is normal. No respiratory distress.      Breath sounds: No wheezing, rhonchi or rales.   Abdominal:      General: Abdomen is flat. Bowel sounds are normal. There is no distension.      Palpations: Abdomen is soft.   Musculoskeletal:         General: No swelling.      Cervical back: Normal range of motion and neck supple.   Skin:     General: Skin is warm and dry.      Capillary Refill: Capillary refill takes less than 2 seconds.      Coloration: Skin is not jaundiced.   Neurological:      General: No focal deficit present.      Mental Status: He is alert.   Psychiatric:         Mood and Affect: Mood normal.

## 2022-09-27 NOTE — ED PROVIDER NOTES
Signout Note  I received signout from the previous providers.     Chief complaint:  Tachycardia (Has holter monitor on, hr , had sweats/)    Pertinent History, ED course, Disposition:    Per sign out and chart review: Wilbur Diop is a 42 y.o. male with pertinent PMH of depression, history of AFib and SVT status post ablation in the past presents for lightheadedness episodes.     Pt signed out to me pending:  Cardiology/electrophysiology evaluation and recommendations.    Update/ Disposition:  Patient was seen by Cardiology.  They state that the patient is safe to go home.  Their plan is listed below.  Patient was discharged with a script of a rapid male.  He is instructed to stop propanolol.  Plan:  Start Verapamil 120 mg QD starting today  Stop Propranolol  Complete Hotler monitor to increase yield of picking up the rhythm in question  Will call him with results once he has submitted the Holter on 9/28/2022  Can discharge home and continue with his established EP appointment     I assessed the patient prior to discharge.  His vitals were stable.  He has no complaints time.  His gait is stable.  Strict return precautions were discussed.  I instructed him to follow-up with his PCP in the next 1-2 days.  Ambulatory referral for Cardiology was placed after Cardiology Team evaluated and discussed the case with us in the ED prior to disposition.  He was discharged with a paper script for verapamil.  Patient deemed stable for discharge.    Patient,caregiver and/or family understands the plan and verbalized agreement. All questions answered.     Diagnostic Impression:    1. SSS (sick sinus syndrome)    2. Tachycardia         ED Disposition Condition    Discharge Stable          ED Prescriptions       Medication Sig Dispense Start Date End Date Auth. Provider    verapamiL (CALAN-SR) 120 MG CR tablet  (Status: Discontinued) Take 1 tablet (120 mg total) by mouth every evening. 30 tablet 9/27/2022 9/27/2022 Doc  MD You    verapamiL (CALAN-SR) 120 MG CR tablet Take 1 tablet (120 mg total) by mouth every evening. 30 tablet 9/27/2022 9/27/2023 Joceline Frey MD          Follow-up Information       Follow up With Specialties Details Why Contact Info Additional Information    Ge Naranjo MD Internal Medicine In 2 days  [id number]  [place] [id number]  [phone]       [facility] - Emergency Dept Emergency Medicine  If symptoms worsen [address] [facility] [id number] [phone]     [facility] - Cardiology - [facility] Cardiology Schedule an appointment as soon as possible for a visit in 1 day  [address] [facility] [id number] [phone] Cardiology Services Clinics - 3rd floor                       Vitals:    09/27/22 1239   BP: (!) 130/92   Pulse: 63   Resp: 15   Temp:        Labs Reviewed   CBC W/ AUTO DIFFERENTIAL - Abnormal; Notable for the following components:       Result Value    Immature Granulocytes 0.6 (*)     Immature Grans (Abs) 0.06 (*)     All other components within normal limits    Narrative:     Release to patient->Immediate   HIV 1 / 2 ANTIBODY    Narrative:     Release to patient->Immediate   HEPATITIS C ANTIBODY    Narrative:     Release to patient->Immediate   COMPREHENSIVE METABOLIC PANEL    Narrative:     Release to patient->Immediate   TSH    Narrative:     Release to patient->Immediate   TROPONIN I    Narrative:     Release to patient->Immediate   MAGNESIUM    Narrative:     Release to patient->Immediate       Imaging Studies:    No orders to display       Medications Given:  Medications - No data to display             Please note that this dictation was completed with computer voice recognition software.  Quite often unanticipated grammatical, syntax, homophones, and other interpretive errors are inadvertently transcribed by the computer software.  Please disregard these errors.  Please excuse any errors that have escaped final proofreading.          Joceline Frey MD  Resident    STAFF ATTENDING PHYSICIAN NOTE:  I have individually/jointly evaluated Wilbur Diop and discussed their ED management with the resident physician. I have also reviewed their notes, assessments, and procedures documented.  I was present during all critical portions of any procedure(s) performed on Wilbur Diop.   ____________________  Timoteo Robert MD, [facility]  Emergency Medicine Staff    09/28/22 0050       David Robert MD  09/30/22 [id number]

## 2022-09-27 NOTE — FIRST PROVIDER EVALUATION
"Medical screening examination initiated.  I have conducted a focused provider triage encounter, findings are as follows:    Brief history of present illness:    42y M, history of ablation. Currently wearing a holter monitor and noted heart rate fluctuating between 139 and dropping to 42. When it drops he feels sweaty and lightheaded, dizzy.       Vitals:    09/27/22 1129   BP: (!) 153/95   Pulse: 65   Resp: 18   Temp: 98.2 °F (36.8 °C)   TempSrc: Oral   SpO2: 98%   Weight: 104.3 kg (230 lb)   Height: 5' 9" (1.753 m)       Pertinent physical exam:    HR 64, not diaphoretic     Brief workup plan:    Labs ordered     Preliminary workup initiated; this workup will be continued and followed by the physician or advanced practice provider that is assigned to the patient when roomed.  "

## 2022-09-27 NOTE — ED PROVIDER NOTES
Encounter Date: 9/27/2022       History     Chief Complaint   Patient presents with    Tachycardia     Has holter monitor on, hr , had sweats       42 y.o. male with depression, history of AFib and SVT status post ablation in the past presents for lightheadedness episodes.  Patient has had these episodes which have been worsening for the past 2 days.  Patient episodes of high heart rate on his monitor as high as the 140s yesterday as well as episodes where his heart rate drops into the 40s.  Patient is significantly symptomatic with these episodes including lightheadedness.  Patient has also had associated diaphoresis.  Patient denies any associated chest pain, loss of consciousness.  Patient does report some shortness of breath with deep inspiration during these episodes, however he is chest pain free and not short of breath on arrival      The history is provided by the patient and medical records.   Review of patient's allergies indicates:  No Known Allergies  Past Medical History:   Diagnosis Date    ADHD (attention deficit hyperactivity disorder)     Allergy     Anxiety     Atrial flutter     Depression     History of psychiatric hospitalization     2002 for depression    Hx of psychiatric care     Psychiatric problem     Supraventricular tachycardia     Therapy      Past Surgical History:   Procedure Laterality Date    APPENDECTOMY      HERNIA REPAIR       Family History   Problem Relation Age of Onset    Depression Mother     Anxiety disorder Mother     No Known Problems Father     No Known Problems Sister     No Known Problems Brother     No Known Problems Maternal Aunt     No Known Problems Maternal Uncle     No Known Problems Paternal Aunt     No Known Problems Paternal Uncle     No Known Problems Maternal Grandmother     Cancer Maternal Grandfather     No Known Problems Paternal Grandmother     No Known Problems Paternal Grandfather     Amblyopia Neg Hx     Blindness Neg Hx     Cataracts Neg Hx      Diabetes Neg Hx     Glaucoma Neg Hx     Hypertension Neg Hx     Macular degeneration Neg Hx     Retinal detachment Neg Hx     Strabismus Neg Hx     Stroke Neg Hx     Thyroid disease Neg Hx      Social History     Tobacco Use    Smoking status: Former     Types: Cigarettes     Quit date: 10/22/2004     Years since quittin.9    Smokeless tobacco: Never   Substance Use Topics    Alcohol use: Yes     Alcohol/week: 1.7 standard drinks     Types: 2 Standard drinks or equivalent per week     Comment: once a month; socially    Drug use: No     Review of Systems   Constitutional:  Negative for fatigue and fever.   HENT:  Negative for rhinorrhea and sore throat.    Eyes:  Negative for discharge and redness.   Respiratory:  Positive for shortness of breath. Negative for cough.    Cardiovascular:  Positive for palpitations. Negative for chest pain.   Gastrointestinal:  Negative for diarrhea, nausea and vomiting.   Endocrine: Negative for cold intolerance and heat intolerance.   Genitourinary:  Negative for dysuria and frequency.   Musculoskeletal:  Negative for myalgias and neck stiffness.   Skin:  Negative for pallor and rash.   Neurological:  Positive for light-headedness. Negative for dizziness and headaches.   Psychiatric/Behavioral:  Negative for agitation and confusion.      Physical Exam     Initial Vitals [22 1129]   BP Pulse Resp Temp SpO2   (!) 153/95 65 18 98.2 °F (36.8 °C) 98 %      MAP       --         Physical Exam    Nursing note and vitals reviewed.  Constitutional:   Alert, normal work of breathing, speaking full sentences   HENT:   Head: Normocephalic and atraumatic.   Mouth/Throat: Oropharynx is clear and moist.   Eyes: Conjunctivae are normal. No scleral icterus.   Cardiovascular:  Normal rate, regular rhythm and normal heart sounds.           Pulmonary/Chest: Breath sounds normal. No stridor. No respiratory distress.   Abdominal: Abdomen is soft. He exhibits no distension. There is no abdominal  tenderness.   Musculoskeletal:         General: No tenderness or edema.     Neurological: He is alert and oriented to person, place, and time.   Skin: Skin is warm and dry. No rash noted.   Psychiatric: He has a normal mood and affect. Thought content normal.       ED Course   Procedures  Labs Reviewed   CBC W/ AUTO DIFFERENTIAL - Abnormal; Notable for the following components:       Result Value    Immature Granulocytes 0.6 (*)     Immature Grans (Abs) 0.06 (*)     All other components within normal limits    Narrative:     Release to patient->Immediate   HIV 1 / 2 ANTIBODY    Narrative:     Release to patient->Immediate   HEPATITIS C ANTIBODY    Narrative:     Release to patient->Immediate   COMPREHENSIVE METABOLIC PANEL    Narrative:     Release to patient->Immediate   TSH    Narrative:     Release to patient->Immediate   TROPONIN I    Narrative:     Release to patient->Immediate   MAGNESIUM    Narrative:     Release to patient->Immediate     EKG Readings: (Independently Interpreted)   Sinus rhythm, regular, narrow complex, rate of 62 normal intervals, no ST elevation, overall unchanged compared to previous   ECG Results              EKG 12-lead (Final result)  Result time 09/27/22 15:25:02      Final result by Interface, Lab In [facility] (09/27/22 15:25:02)                   Narrative:    Test Reason : R00.0,    Vent. Rate : 062 BPM     Atrial Rate : 062 BPM     P-R Int : 140 ms          QRS Dur : 084 ms      QT Int : 386 ms       P-R-T Axes : 052 014 019 degrees     QTc Int : 391 ms    Normal sinus rhythm  Normal ECG  When compared with ECG of 18-MAR-2022 18:22,  No significant change was found  Confirmed by Rizwan CORDOVA MD ([id number]) on 9/27/2022 3:24:50 PM    Referred By: LAVERN   SELF           Confirmed By:Rizwan CORDOVA MD                                  Imaging Results    None          Medications - No data to display  Medical Decision Making:   History:   Old Medical Records: I decided to obtain old medical  records.  Old Records Summarized: records from clinic visits and records from previous admission(s).  Initial Assessment:   42 y.o. male with depression, history of AFib and SVT status post ablation in the past presents for episodic lightheadedness and palpitation  Patient without hypoxia or respiratory distress  EKG shows sinus rhythm without evidence of arrhythmia or abnormal interval  Patient is stable based on blood pressure, mental status, clinical stability  Presentation most consistent with symptomatic atrial fibrillation  Differentials include a flutter, sick sinus syndrome, tachy-george syndrome, electrolyte derangement, ischemia, medication overdose  No evidence of cardiogenic shock or heart failure  Risk factors, quality of symptoms, clinical picture inconsistent with ACS/ischemia  Patient did start Vraylar recently, however his QT is not prolonged and his symptoms started before starting this medication  Patient does not have chest pain/tightness    Independently Interpreted Test(s):   I have ordered and independently interpreted EKG Reading(s) - see prior notes  Clinical Tests:   Lab Tests: Ordered and Reviewed  Radiological Study: Ordered and Reviewed  Medical Tests: Ordered and Reviewed          Attending Attestation:   Physician Attestation Statement for Resident:  As the supervising MD   Physician Attestation Statement: I have personally seen and examined this patient.   I agree with the above history.  -:   As the supervising MD I agree with the above PE.     As the supervising MD I agree with the above treatment, course, plan, and disposition.                               Clinical Impression:   Final diagnoses:  [R00.0] Tachycardia  [I49.5] SSS (sick sinus syndrome) (Primary)        ED Disposition Condition    Discharge Stable          ED Prescriptions       Medication Sig Dispense Start Date End Date Auth. Provider    verapamiL (CALAN-SR) 120 MG CR tablet  (Status: Discontinued) Take 1 tablet  (120 mg total) by mouth every evening. 30 tablet 9/27/2022 9/27/2022 Doc Orta MD    verapamiL (CALAN-SR) 120 MG CR tablet Take 1 tablet (120 mg total) by mouth every evening. 30 tablet 9/27/2022 9/27/2023 Joceline Frey MD          Follow-up Information       Follow up With Specialties Details Why Contact Info Additional Information    Ge Naranjo MD Internal Medicine In 2 days  [address] [facility] [id number]  [phone]       [facility] - Emergency Dept Emergency Medicine  If symptoms worsen [address] [facility] [address] [phone]     [facility] - Cardiology - [facility] Cardiology Schedule an appointment as soon as possible for a visit in 1 day  [address] [address] [phone] Cardiology Services Clinics - 3rd floor             Zia Sanabria MD  Resident  09/27/22 1557       Nathaniel Uribe MD  09/28/22 [id number]

## 2022-09-27 NOTE — ED NOTES
Wilbur Diop, a 42 y.o. male , PT states he has a Holter monitor that he was instructed to wear for 48 hours, return monitor tomorrow to evaluate SVT vs Afib. Pt reports episodes where his heart fluctuates between 30s to 130s. Endorses weakness, lightheadedness, and SOB during these episodes specifically when he george down. Cardiologist told him to come to ER. Hx of ablation in march 2022     Triage note:  Chief Complaint   Patient presents with    Tachycardia     Has holter monitor on, hr , had sweats       Review of patient's allergies indicates:  No Known Allergies  Past Medical History:   Diagnosis Date    ADHD (attention deficit hyperactivity disorder)     Allergy     Anxiety     Atrial flutter     Depression     History of psychiatric hospitalization     2002 for depression    Hx of psychiatric care     Psychiatric problem     Supraventricular tachycardia     Therapy

## 2022-09-29 ENCOUNTER — PATIENT MESSAGE (OUTPATIENT)
Dept: ELECTROPHYSIOLOGY | Facility: CLINIC | Age: 42
End: 2022-09-29
Payer: MEDICAID

## 2022-10-03 LAB
OHS CV EVENT MONITOR DAY: 0
OHS CV HOLTER LENGTH DECIMAL HOURS: 48
OHS CV HOLTER LENGTH HOURS: 48
OHS CV HOLTER LENGTH MINUTES: 0
OHS CV HOLTER SINUS AVERAGE HR: 70
OHS CV HOLTER SINUS MAX HR: 182
OHS CV HOLTER SINUS MIN HR: 39

## 2022-10-04 ENCOUNTER — TELEPHONE (OUTPATIENT)
Dept: ELECTROPHYSIOLOGY | Facility: CLINIC | Age: 42
End: 2022-10-04
Payer: MEDICAID

## 2022-10-04 ENCOUNTER — PATIENT MESSAGE (OUTPATIENT)
Dept: INTERNAL MEDICINE | Facility: CLINIC | Age: 42
End: 2022-10-04
Payer: MEDICAID

## 2022-10-04 DIAGNOSIS — R73.01 IMPAIRED FASTING GLUCOSE: ICD-10-CM

## 2022-10-04 DIAGNOSIS — R68.82 DECREASED LIBIDO: ICD-10-CM

## 2022-10-04 DIAGNOSIS — Z00.00 ANNUAL PHYSICAL EXAM: Primary | ICD-10-CM

## 2022-10-04 NOTE — TELEPHONE ENCOUNTER
Called patient to reschedule apts on 10/28 r/t MD book out. Patient agrees to apt on 10/21. Patient also requesting holter results and to clarify if EKG needed on 10/12. Told pt would discuss with MD and follow up.

## 2022-10-06 ENCOUNTER — TELEMEDICINE (OUTPATIENT)
Dept: ELECTROPHYSIOLOGY | Facility: CLINIC | Age: 42
End: 2022-10-06
Payer: MEDICAID

## 2022-10-06 RX ORDER — FLECAINIDE ACETATE 100 MG/1
100 TABLET ORAL EVERY 12 HOURS
Qty: 60 TABLET | Refills: 11 | Status: SHIPPED | OUTPATIENT
Start: 2022-10-06 | End: 2022-10-10 | Stop reason: SDUPTHER

## 2022-10-06 NOTE — PROGRESS NOTES
PAF 4% seen on Holter. Patient is symptomatic (palpitations).   Full sentences. No distress.    Will start flecainide.  f/u as scheduled.    Rx for flecainide sent to pharmacy.  f/u as scheduled.

## 2022-10-10 DIAGNOSIS — I48.0 PAF (PAROXYSMAL ATRIAL FIBRILLATION): Primary | ICD-10-CM

## 2022-10-10 RX ORDER — FLECAINIDE ACETATE 100 MG/1
100 TABLET ORAL EVERY 12 HOURS
Qty: 180 TABLET | Refills: 3 | Status: SHIPPED | OUTPATIENT
Start: 2022-10-10 | End: 2022-12-13 | Stop reason: SDUPTHER

## 2022-10-12 ENCOUNTER — PATIENT MESSAGE (OUTPATIENT)
Dept: ELECTROPHYSIOLOGY | Facility: CLINIC | Age: 42
End: 2022-10-12
Payer: MEDICAID

## 2022-10-12 DIAGNOSIS — I47.10 PSVT (PAROXYSMAL SUPRAVENTRICULAR TACHYCARDIA): ICD-10-CM

## 2022-10-12 DIAGNOSIS — I48.3 TYPICAL ATRIAL FLUTTER: Primary | ICD-10-CM

## 2022-10-12 DIAGNOSIS — I47.10 PSVT (PAROXYSMAL SUPRAVENTRICULAR TACHYCARDIA): Primary | ICD-10-CM

## 2022-10-12 DIAGNOSIS — I48.3 TYPICAL ATRIAL FLUTTER: ICD-10-CM

## 2022-10-13 NOTE — PROGRESS NOTES
"Outpatient Psychiatry Follow-Up Visit (MD/NP)    7/5/2022 The patient location is: home.  The chief complaint leading to consultation is: depression    Visit type: audiovisual    Face to Face time with patient: 29" (5" on meds and 24" for supportive counseling and update of history and mental status)  31 minutes of total time spent on the encounter, which includes face to face time and non-face to face time preparing to see the patient (eg, review of tests), Obtaining and/or reviewing separately obtained history, Documenting clinical information in the electronic or other health record, Independently interpreting results (not separately reported) and communicating results to the patient/family/caregiver, or Care coordination (not separately reported).         Each patient to whom he or she provides medical services by telemedicine is:  (1) informed of the relationship between the physician and patient and the respective role of any other health care provider with respect to management of the patient; and (2) notified that he or she may decline to receive medical services by telemedicine and may withdraw from such care at any time.    Notes: See below.    Clinical Status of Patient:  Outpatient (Ambulatory)    Chief Complaint:  Wilbur Diop is a 42 y.o. male who presents today for follow-up of depression and anxiety.  Met with patient and no relatives present.      Interval History and Content of Current Session:  Interim Events/Subjective Report/Content of Current Session: Patient is more positive now and feels "like I am doing better". Patient has a new job too with which he is pleased. Patient states his anxiety level is also better controlled. Patient also seems calmer overall and less agitated. Patient has no thoughts of harming himself and no signs of ranjan or psychosis. Patient describes himself as also feeling less fearful too. Patient is more confident. We worked on reducing his excess worrying too. " Group Topic: BH Group Psychotherapy    Date: 10/13/2022  Start Time: 1030  End Time: 1130  Facilitators: Anjana Lindo LPC    Focus:  Future Focus  Number in attendance: 10  Pts discussed the impact of emotions on our thoughts, actions and outlook for the future.   An artistic representation was completed.    Method: Group  Attendance: Present  Participation: Active  Patient Response: Appropriate feedback  Mood: Normal  Pt worked cooperatively to complete the activity       Support was offered to patient re his concern in this matter. Patient stated his wife also notices his progress and current increase in his mood and reduced overall anxieties.    Psychotherapy:  · Target symptoms: depression, anxiety   · Why chosen therapy is appropriate versus another modality: relevant to diagnosis, patient responds to this modality, evidence based practice  · Outcome monitoring methods: self-report  · Therapeutic intervention type: supportive psychotherapy  · Topics discussed/themes: mood and anxiety issues  · The patient's response to the intervention is accepting. The patient's progress toward treatment goals is good.   · Duration of intervention: 29 minutes.    Review of Systems   · PSYCHIATRIC: Pertinant items are noted in the narrative.    Past Medical, Family and Social History: The patient's past medical, family and social history have been reviewed and updated as appropriate within the electronic medical record - see encounter notes.    Compliance: yes    Side effects: possible decreased appetite    Risk Parameters:  Patient reports no suicidal ideation  Patient reports no homicidal ideation  Patient reports no self-injurious behavior  Patient reports no violent behavior    Exam (detailed: at least 9 elements; comprehensive: all 15 elements)   Constitutional  Vitals:  Most recent vital signs, dated less than 90 days prior to this appointment, were reviewed.   There were no vitals filed for this visit.     General:  unremarkable, age appropriate, casually dressed, neatly groomed     Musculoskeletal  Muscle Strength/Tone:  patient reports adequate muscle strength   Gait & Station:  non-ataxic     Psychiatric  Speech:  no latency; no press, spontaneous   Mood & Affect:  steady, anxious  congruent and appropriate   Thought Process:  normal and logical   Associations:  intact   Thought Content:  normal, no suicidality, no homicidality, delusions, or paranoia   Insight:  has awareness of  illness   Judgement: behavior is adequate to circumstances   Orientation:  grossly intact   Memory: intact for content of interview   Language: grossly intact   Attention Span & Concentration:  able to focus   Fund of Knowledge:  not tested     Assessment and Diagnosis   Status/Progress: Based on the examination today, the patient's problem(s) is/are improved.  New problems have not been presented today.   excess worrying are not complicating management of the primary condition.  The working differential for this patient includes depression and anxiety.     General Impression: Patient is more relaxed and more positive with his thinking and upbeat and hopeful at this time. Patient is more lighthearted as well and pleased with his current progress.      ICD-10-CM ICD-9-CM   1. Recurrent major depressive disorder, in partial remission  F33.41 296.35   2. RACHEL (generalized anxiety disorder)  F41.1 300.02       Intervention/Counseling/Treatment Plan   · Medication Management: The risks and benefits of medication were discussed with the patient. Patient agrees to increase Buspar to 20 mg bid, and continue Zoloft 150 mg daily.      Return to Clinic: Patient will see another doctor due to my correction

## 2022-10-17 ENCOUNTER — PATIENT MESSAGE (OUTPATIENT)
Dept: ELECTROPHYSIOLOGY | Facility: CLINIC | Age: 42
End: 2022-10-17
Payer: MEDICAID

## 2022-10-19 ENCOUNTER — HOSPITAL ENCOUNTER (OUTPATIENT)
Dept: CARDIOLOGY | Facility: CLINIC | Age: 42
Discharge: HOME | End: 2022-10-19
Payer: MEDICAID

## 2022-10-19 PROCEDURE — 93000 ELECTROCARDIOGRAM COMPLETE: CPT | Mod: S$GLB,,, | Performed by: INTERNAL MEDICINE

## 2022-10-19 PROCEDURE — 93000 EKG 12-LEAD: ICD-10-PCS | Mod: S$GLB,,, | Performed by: INTERNAL MEDICINE

## 2022-10-20 ENCOUNTER — TELEPHONE (OUTPATIENT)
Dept: ELECTROPHYSIOLOGY | Facility: CLINIC | Age: 42
End: 2022-10-20
Payer: MEDICAID

## 2022-10-21 ENCOUNTER — OFFICE VISIT (OUTPATIENT)
Dept: ELECTROPHYSIOLOGY | Facility: CLINIC | Age: 42
End: 2022-10-21
Payer: MEDICAID

## 2022-10-21 VITALS — HEART RATE: 70 BPM | SYSTOLIC BLOOD PRESSURE: 152 MMHG | DIASTOLIC BLOOD PRESSURE: 96 MMHG

## 2022-10-21 DIAGNOSIS — Z98.890 STATUS POST CATHETER ABLATION OF ATRIAL FLUTTER: ICD-10-CM

## 2022-10-21 DIAGNOSIS — F41.1 GAD (GENERALIZED ANXIETY DISORDER): ICD-10-CM

## 2022-10-21 DIAGNOSIS — I47.10 PSVT (PAROXYSMAL SUPRAVENTRICULAR TACHYCARDIA): Primary | ICD-10-CM

## 2022-10-21 DIAGNOSIS — I48.0 PAF (PAROXYSMAL ATRIAL FIBRILLATION): ICD-10-CM

## 2022-10-21 PROCEDURE — 1159F MED LIST DOCD IN RCRD: CPT | Mod: CPTII,95,, | Performed by: INTERNAL MEDICINE

## 2022-10-21 PROCEDURE — 1159F PR MEDICATION LIST DOCUMENTED IN MEDICAL RECORD: ICD-10-PCS | Mod: CPTII,95,, | Performed by: INTERNAL MEDICINE

## 2022-10-21 PROCEDURE — 99215 OFFICE O/P EST HI 40 MIN: CPT | Mod: 95,,, | Performed by: INTERNAL MEDICINE

## 2022-10-21 PROCEDURE — 1160F RVW MEDS BY RX/DR IN RCRD: CPT | Mod: CPTII,95,, | Performed by: INTERNAL MEDICINE

## 2022-10-21 PROCEDURE — 3080F PR MOST RECENT DIASTOLIC BLOOD PRESSURE >= 90 MM HG: ICD-10-PCS | Mod: CPTII,95,, | Performed by: INTERNAL MEDICINE

## 2022-10-21 PROCEDURE — 3077F SYST BP >= 140 MM HG: CPT | Mod: CPTII,95,, | Performed by: INTERNAL MEDICINE

## 2022-10-21 PROCEDURE — 99215 PR OFFICE/OUTPT VISIT, EST, LEVL V, 40-54 MIN: ICD-10-PCS | Mod: 95,,, | Performed by: INTERNAL MEDICINE

## 2022-10-21 PROCEDURE — 3077F PR MOST RECENT SYSTOLIC BLOOD PRESSURE >= 140 MM HG: ICD-10-PCS | Mod: CPTII,95,, | Performed by: INTERNAL MEDICINE

## 2022-10-21 PROCEDURE — 3080F DIAST BP >= 90 MM HG: CPT | Mod: CPTII,95,, | Performed by: INTERNAL MEDICINE

## 2022-10-21 PROCEDURE — 1160F PR REVIEW ALL MEDS BY PRESCRIBER/CLIN PHARMACIST DOCUMENTED: ICD-10-PCS | Mod: CPTII,95,, | Performed by: INTERNAL MEDICINE

## 2022-10-21 NOTE — PROGRESS NOTES
Subjective:    Patient ID:  Wilbur Diop is a 42 y.o. male who presents for evaluation of SVT/AFL    The patient location is: home  The chief complaint leading to consultation is: AFL  Visit type: audiovisual  Total time spent with patient: 30 min  Each patient to whom he or she provides medical services by telemedicine is:  (1) informed of the relationship between the physician and patient and the respective role of any other health care provider with respect to management of the patient; and (2) notified that he or she may decline to receive medical services by telemedicine and may withdraw from such care at any time.    HPI   42 y.o. M  anxiety, depression; on meds  AFL, s/p RF CTI  PAF, on flecainide    Had racing heart rate for >2 days. Went to ER 2/11/22; diagnosed with SVT but ECG appears c/w typical AFL with 2:1 conduction. Reportedly CSM had no effect, and arrhythmia terminated following 12 mg adenosine. No ECG of termination.  Felt well since.    Went to ER recently. Again c/w typical AFL. Rx or AFL with RVR was diltiazem; repeat ECG showed NSR.    Update 10/2022:  RF of AFL 3/22. During that case, AFL degenerated to AF at times. SVT was not inducible with up to DAES.  Subsequently, had PAF requiring hospital visit and then monitoring showed 4% AF. Started flecainide.  Since flecainide, doing well.    My interpretation of recent ECG is NSR    Review of Systems   Constitutional: Negative. Negative for malaise/fatigue.   HENT: Negative.  Negative for ear pain and tinnitus.    Eyes:  Negative for blurred vision.   Cardiovascular:  Positive for palpitations. Negative for chest pain, dyspnea on exertion, near-syncope and syncope.   Respiratory: Negative.  Negative for shortness of breath.    Endocrine: Negative.  Negative for polyuria.   Hematologic/Lymphatic: Does not bruise/bleed easily.   Skin: Negative.  Negative for rash.   Musculoskeletal: Negative.  Negative for joint pain and muscle weakness.    Gastrointestinal: Negative.  Negative for abdominal pain and change in bowel habit.   Genitourinary:  Negative for frequency.   Neurological: Negative.  Negative for dizziness and weakness.   Psychiatric/Behavioral:  Positive for depression. The patient is nervous/anxious.    Allergic/Immunologic: Negative for environmental allergies.      Objective:      Well developed, well nourished.   No distress.  Speaks in full sentences.    Assessment:       AFL s/p RFA  PAF     Plan:       Discussed AF and its basic pathophysiology, including its health implications and treatment options (rate vs. rhythm control, meds vs. procedural/device treatment) as appropriate for the patient.  Discussed AFL and its basic pathophysiology, including its health implications and treatment options (rate vs. rhythm control, meds vs. procedural/device treatment) as appropriate for the patient.    Doing well on flecainide for past weeks.   Discussed options: AAD vs. PVI, with pros/cons of each approach. If opts for PVI, would likely use cryoballoon.    Informed consent was obtained, we discussed the risks and benefits of  the procedure (pulmonary vein isolation) which included (but was not limited to) the possibility of bleeding or injury to the vessels involved, embolism, cardiac perforation, cardiac tamponade requiring emergent drainage with a pericardial drain or surgery, esophageal injury or fistula formation, phrenic nerve injury, pulmonary vein stenosis, injury to the native conduction system of the heart requiring a PPM, renal injury if contrast used, MI, stroke, and death. We also reviewed indications, and alternatives of the planned procedure. All questions were answered.  I discussed with patient risks, indications, benefits, and alternatives of the planned procedure. All questions were answered.    We'll call next week. If he desires PVI, then will start xarelto and plan for cryo PVI (including CT before, and RAFAEL day of). If no  PVI at this time, then f/u 6 mos.

## 2022-11-01 ENCOUNTER — PATIENT MESSAGE (OUTPATIENT)
Dept: ELECTROPHYSIOLOGY | Facility: CLINIC | Age: 42
End: 2022-11-01
Payer: MEDICAID

## 2022-11-01 ENCOUNTER — TELEPHONE (OUTPATIENT)
Dept: ELECTROPHYSIOLOGY | Facility: CLINIC | Age: 42
End: 2022-11-01
Payer: MEDICAID

## 2022-11-01 NOTE — TELEPHONE ENCOUNTER
Called pt to see if he has decided on moving forward with ablation, explained the current first available date for the procedure would 1/23, he reports he would like to get a second opinion prior to scheduling the procedure, he will call us back once he has made his final decision

## 2022-11-02 ENCOUNTER — PATIENT MESSAGE (OUTPATIENT)
Dept: ELECTROPHYSIOLOGY | Facility: CLINIC | Age: 42
End: 2022-11-02
Payer: MEDICAID

## 2022-11-02 ENCOUNTER — PATIENT MESSAGE (OUTPATIENT)
Dept: INTERNAL MEDICINE | Facility: CLINIC | Age: 42
End: 2022-11-02
Payer: MEDICAID

## 2022-11-02 DIAGNOSIS — I48.0 PAF (PAROXYSMAL ATRIAL FIBRILLATION): Primary | ICD-10-CM

## 2022-11-03 DIAGNOSIS — I48.0 PAF (PAROXYSMAL ATRIAL FIBRILLATION): Primary | ICD-10-CM

## 2022-11-07 ENCOUNTER — PATIENT MESSAGE (OUTPATIENT)
Dept: INTERNAL MEDICINE | Facility: CLINIC | Age: 42
End: 2022-11-07
Payer: MEDICAID

## 2022-11-17 ENCOUNTER — LAB VISIT (OUTPATIENT)
Dept: LAB | Facility: HOSPITAL | Age: 42
End: 2022-11-17
Attending: INTERNAL MEDICINE
Payer: MEDICAID

## 2022-11-17 DIAGNOSIS — R68.82 DECREASED LIBIDO: ICD-10-CM

## 2022-11-17 DIAGNOSIS — R73.01 IMPAIRED FASTING GLUCOSE: ICD-10-CM

## 2022-11-17 DIAGNOSIS — Z00.00 ANNUAL PHYSICAL EXAM: ICD-10-CM

## 2022-11-17 LAB
ALBUMIN SERPL BCP-MCNC: 4.1 G/DL (ref 3.5–5.2)
ALP SERPL-CCNC: 69 U/L (ref 55–135)
ALT SERPL W/O P-5'-P-CCNC: 36 U/L (ref 10–44)
ANION GAP SERPL CALC-SCNC: 13 MMOL/L (ref 8–16)
AST SERPL-CCNC: 22 U/L (ref 10–40)
BASOPHILS # BLD AUTO: 0.06 K/UL (ref 0–0.2)
BASOPHILS NFR BLD: 0.8 % (ref 0–1.9)
BILIRUB SERPL-MCNC: 0.7 MG/DL (ref 0.1–1)
BUN SERPL-MCNC: 15 MG/DL (ref 6–20)
CALCIUM SERPL-MCNC: 10.2 MG/DL (ref 8.7–10.5)
CHLORIDE SERPL-SCNC: 101 MMOL/L (ref 95–110)
CHOLEST SERPL-MCNC: 244 MG/DL (ref 120–199)
CHOLEST/HDLC SERPL: 4.7 {RATIO} (ref 2–5)
CO2 SERPL-SCNC: 24 MMOL/L (ref 23–29)
COMPLEXED PSA SERPL-MCNC: 0.2 NG/ML (ref 0–4)
CREAT SERPL-MCNC: 0.9 MG/DL (ref 0.5–1.4)
DIFFERENTIAL METHOD: NORMAL
EOSINOPHIL # BLD AUTO: 0.2 K/UL (ref 0–0.5)
EOSINOPHIL NFR BLD: 2.1 % (ref 0–8)
ERYTHROCYTE [DISTWIDTH] IN BLOOD BY AUTOMATED COUNT: 12.4 % (ref 11.5–14.5)
EST. GFR  (NO RACE VARIABLE): >60 ML/MIN/1.73 M^2
ESTIMATED AVG GLUCOSE: 126 MG/DL (ref 68–131)
GLUCOSE SERPL-MCNC: 115 MG/DL (ref 70–110)
HBA1C MFR BLD: 6 % (ref 4–5.6)
HCT VFR BLD AUTO: 47.3 % (ref 40–54)
HDLC SERPL-MCNC: 52 MG/DL (ref 40–75)
HDLC SERPL: 21.3 % (ref 20–50)
HGB BLD-MCNC: 15.8 G/DL (ref 14–18)
IMM GRANULOCYTES # BLD AUTO: 0.02 K/UL (ref 0–0.04)
IMM GRANULOCYTES NFR BLD AUTO: 0.3 % (ref 0–0.5)
LDLC SERPL CALC-MCNC: 177.2 MG/DL (ref 63–159)
LYMPHOCYTES # BLD AUTO: 2.3 K/UL (ref 1–4.8)
LYMPHOCYTES NFR BLD: 32 % (ref 18–48)
MCH RBC QN AUTO: 29.4 PG (ref 27–31)
MCHC RBC AUTO-ENTMCNC: 33.4 G/DL (ref 32–36)
MCV RBC AUTO: 88 FL (ref 82–98)
MONOCYTES # BLD AUTO: 0.6 K/UL (ref 0.3–1)
MONOCYTES NFR BLD: 8.1 % (ref 4–15)
NEUTROPHILS # BLD AUTO: 4.1 K/UL (ref 1.8–7.7)
NEUTROPHILS NFR BLD: 56.7 % (ref 38–73)
NONHDLC SERPL-MCNC: 192 MG/DL
NRBC BLD-RTO: 0 /100 WBC
PLATELET # BLD AUTO: 328 K/UL (ref 150–450)
PMV BLD AUTO: 10.2 FL (ref 9.2–12.9)
POTASSIUM SERPL-SCNC: 4.8 MMOL/L (ref 3.5–5.1)
PROT SERPL-MCNC: 7.6 G/DL (ref 6–8.4)
RBC # BLD AUTO: 5.38 M/UL (ref 4.6–6.2)
SODIUM SERPL-SCNC: 138 MMOL/L (ref 136–145)
T4 FREE SERPL-MCNC: 0.87 NG/DL (ref 0.71–1.51)
TESTOST SERPL-MCNC: 186 NG/DL (ref 304–1227)
TRIGL SERPL-MCNC: 74 MG/DL (ref 30–150)
TSH SERPL DL<=0.005 MIU/L-ACNC: 1.15 UIU/ML (ref 0.4–4)
WBC # BLD AUTO: 7.29 K/UL (ref 3.9–12.7)

## 2022-11-17 PROCEDURE — 85025 COMPLETE CBC W/AUTO DIFF WBC: CPT | Performed by: INTERNAL MEDICINE

## 2022-11-17 PROCEDURE — 36415 COLL VENOUS BLD VENIPUNCTURE: CPT | Mod: PO | Performed by: INTERNAL MEDICINE

## 2022-11-17 PROCEDURE — 84443 ASSAY THYROID STIM HORMONE: CPT | Performed by: INTERNAL MEDICINE

## 2022-11-17 PROCEDURE — 84439 ASSAY OF FREE THYROXINE: CPT | Performed by: INTERNAL MEDICINE

## 2022-11-17 PROCEDURE — 80061 LIPID PANEL: CPT | Performed by: INTERNAL MEDICINE

## 2022-11-17 PROCEDURE — 84403 ASSAY OF TOTAL TESTOSTERONE: CPT | Performed by: INTERNAL MEDICINE

## 2022-11-17 PROCEDURE — 83036 HEMOGLOBIN GLYCOSYLATED A1C: CPT | Performed by: INTERNAL MEDICINE

## 2022-11-17 PROCEDURE — 84153 ASSAY OF PSA TOTAL: CPT | Performed by: INTERNAL MEDICINE

## 2022-11-17 PROCEDURE — 80053 COMPREHEN METABOLIC PANEL: CPT | Performed by: INTERNAL MEDICINE

## 2022-11-30 ENCOUNTER — PATIENT MESSAGE (OUTPATIENT)
Dept: INTERNAL MEDICINE | Facility: CLINIC | Age: 42
End: 2022-11-30
Payer: MEDICAID

## 2022-11-30 NOTE — TELEPHONE ENCOUNTER
Looking for lab results, lab result note says to discuss at visit. Pt not scheduled, at visit on 5/22 it says for pt to f/u in 1yr

## 2022-12-01 ENCOUNTER — PATIENT MESSAGE (OUTPATIENT)
Dept: INTERNAL MEDICINE | Facility: CLINIC | Age: 42
End: 2022-12-01
Payer: MEDICAID

## 2022-12-01 NOTE — TELEPHONE ENCOUNTER
I'd prefer not to overbook on that day.  If he can only do Monday around lunch, we can offer the next monday

## 2022-12-09 ENCOUNTER — PATIENT MESSAGE (OUTPATIENT)
Dept: ELECTROPHYSIOLOGY | Facility: CLINIC | Age: 42
End: 2022-12-09
Payer: MEDICAID

## 2022-12-12 ENCOUNTER — OFFICE VISIT (OUTPATIENT)
Dept: INTERNAL MEDICINE | Facility: CLINIC | Age: 42
End: 2022-12-12
Payer: MEDICAID

## 2022-12-12 ENCOUNTER — PATIENT MESSAGE (OUTPATIENT)
Dept: MEDSURG UNIT | Facility: HOSPITAL | Age: 42
End: 2022-12-12
Payer: MEDICAID

## 2022-12-12 ENCOUNTER — PATIENT MESSAGE (OUTPATIENT)
Dept: INTERNAL MEDICINE | Facility: CLINIC | Age: 42
End: 2022-12-12

## 2022-12-12 DIAGNOSIS — R73.01 IMPAIRED FASTING GLUCOSE: ICD-10-CM

## 2022-12-12 DIAGNOSIS — E29.1 HYPOGONADISM IN MALE: Primary | ICD-10-CM

## 2022-12-12 PROCEDURE — 3044F PR MOST RECENT HEMOGLOBIN A1C LEVEL <7.0%: ICD-10-PCS | Mod: CPTII,95,, | Performed by: INTERNAL MEDICINE

## 2022-12-12 PROCEDURE — 99214 PR OFFICE/OUTPT VISIT, EST, LEVL IV, 30-39 MIN: ICD-10-PCS | Mod: 95,,, | Performed by: INTERNAL MEDICINE

## 2022-12-12 PROCEDURE — 99214 OFFICE O/P EST MOD 30 MIN: CPT | Mod: 95,,, | Performed by: INTERNAL MEDICINE

## 2022-12-12 PROCEDURE — 3044F HG A1C LEVEL LT 7.0%: CPT | Mod: CPTII,95,, | Performed by: INTERNAL MEDICINE

## 2022-12-12 RX ORDER — VENLAFAXINE HYDROCHLORIDE 75 MG/1
75 CAPSULE, EXTENDED RELEASE ORAL DAILY
COMMUNITY
End: 2023-04-03

## 2022-12-12 NOTE — PROGRESS NOTES
[facility] Virtual Visit Note    The patient location is: [place]  The chief complaint leading to consultation is: follow up abnormal labs  Visit type: Virtual visit with synchronous audio and video  Total time spent with patient: 20 min  Each patient to whom he or she provides medical services by telemedicine is:  (1) informed of the relationship between the physician and patient and the respective role of any other health care provider with respect to management of the patient; and (2) notified that he or she may decline to receive medical services by telemedicine and may withdraw from such care at any time.      Chief Complaint    No chief complaint on file.      History of Present Illness      Wilbur Diop is a 42 y.o. male with chronic conditions of SVT, atrial flutter, anxiety, ADD who presents today for: follow up chronic conditions.  Labs show prediabetes and high cholesterol.    Anxiety, depression:  Sees Dr. Malin.  Controlled on sertraline and buspirone.  ADD:  Sees Dr. Malin  Controlled on Azstarys.  Flu shot declines.  Tdap 2016.  Shingrix due at age 48.  Pneumonia vaccine due at age 63.  C scope due at age 43.  PSA due at age 43.    Past Medical History:  Past Medical History:   Diagnosis Date    ADHD (attention deficit hyperactivity disorder)     Allergy     Anxiety     Atrial fibrillation     Atrial flutter     Depression     History of psychiatric hospitalization     2002 for depression    Hx of psychiatric care     Psychiatric problem     Supraventricular tachycardia     Therapy        Past Surgical History:   has a past surgical history that includes Appendectomy and Hernia repair.    Family History:  family history includes Anxiety disorder in his mother; Cancer in his maternal grandfather; Depression in his mother; No Known Problems in his brother, father, maternal aunt, maternal grandmother, maternal uncle, paternal aunt, paternal grandfather, paternal grandmother, paternal  uncle, and sister.     Social History:  Social History     Tobacco Use    Smoking status: Former     Types: Cigarettes     Quit date: 10/22/2004     Years since quittin.1    Smokeless tobacco: Never   Substance Use Topics    Alcohol use: Yes     Alcohol/week: 1.7 standard drinks     Types: 2 Standard drinks or equivalent per week     Comment: once a month; socially    Drug use: No       Review of Systems   Constitutional:  Negative for chills, fever and malaise/fatigue.   Respiratory:  Negative for shortness of breath.    Cardiovascular:  Negative for chest pain.   Gastrointestinal:  Negative for constipation, diarrhea, nausea and vomiting.   Skin:  Negative for rash.   Neurological:  Negative for weakness.   All other systems reviewed and are negative.     Medications:  Outpatient Encounter Medications as of 2022   Medication Sig Dispense Refill    cariprazine (VRAYLAR) 1.5 mg Cap Take 1.5 mg by mouth.      serdexmethylphen-dexmethylphen 26.1 mg- 5.2 mg Cap Take 1 capsule by mouth once daily.      venlafaxine (EFFEXOR-XR) 75 MG 24 hr capsule Take 75 mg by mouth once daily.      cetirizine (ZYRTEC) 5 MG tablet Take 5 mg by mouth once daily.      ergocalciferol, vitamin D2, (VITAMIN D ORAL) Take by mouth.      flecainide (TAMBOCOR) 100 MG Tab Take 1 tablet (100 mg total) by mouth every 12 (twelve) hours. 180 tablet 3    MAGNESIUM ORAL Take by mouth.      multivitamin capsule Take 1 capsule by mouth once daily.      rivaroxaban (XARELTO) 20 mg Tab Take 1 tablet (20 mg total) by mouth daily with dinner or evening meal. 90 tablet 3    verapamiL (CALAN-SR) 120 MG CR tablet Take 1 tablet (120 mg total) by mouth every evening. 30 tablet 11    zinc 50 mg Tab Take by mouth.       No facility-administered encounter medications on file as of 2022.       Allergies:  Review of patient's allergies indicates:  No Known Allergies    Health Maintenance:  Immunization History   Administered Date(s) Administered     Influenza - Intradermal - Quadrivalent - PF 10/22/2014    Influenza - Intradermal - Trivalent - PF 10/22/2014    Influenza - Quadrivalent 10/19/2015    Influenza - Quadrivalent - PF *Preferred* (6 months and older) 01/30/2019    Tdap 11/07/2016      Health Maintenance   Topic Date Due    TETANUS VACCINE  11/07/2026    Lipid Panel  11/17/2027    Hepatitis C Screening  Completed        Physical Exam       ]    Physical Exam  Constitutional:       General: He is not in acute distress.     Appearance: He is well-developed. He is not diaphoretic.   HENT:      Head: Normocephalic and atraumatic.   Eyes:      General: No scleral icterus.        Right eye: No discharge.         Left eye: No discharge.      Conjunctiva/sclera: Conjunctivae normal.   Pulmonary:      Effort: Pulmonary effort is normal. No respiratory distress.   Neurological:      Mental Status: He is alert and oriented to person, place, and time.   Psychiatric:         Behavior: Behavior normal.         Thought Content: Thought content normal.         Judgment: Judgment normal.        Laboratory:  CBC:  Recent Labs   Lab 03/07/22  1413 09/27/22  1201 11/17/22  0842   WBC 10.12 9.52 7.29   RBC 5.24 5.31 5.38   Hemoglobin 15.7 16.0 15.8   Hematocrit 45.9 46.7 47.3   Platelets 296 314 328   MCV 88 88 88   MCH 30.0 30.1 29.4   MCHC 34.2 34.3 33.4     CMP:  Recent Labs   Lab 02/23/22 2001 03/07/22  1413 09/27/22  1201 11/17/22  0842   Glucose 82   < > 94 115 H   Calcium 9.5   < > 9.6 10.2   Albumin 4.0  --  4.2 4.1   Total Protein 7.5  --  7.8 7.6   Sodium 140   < > 136 138   Potassium 3.8   < > 4.1 4.8   CO2 21 L   < > 25 24   Chloride 106   < > 101 101   BUN 21 H   < > 14 15   Alkaline Phosphatase 67  --  75 69   ALT 30  --  27 36   AST 23  --  17 22   Total Bilirubin 0.4  --  0.4 0.7    < > = values in this interval not displayed.     URINALYSIS:       LIPIDS:  Recent Labs   Lab 02/11/22  2248 09/27/22  1201 11/17/22  0842   TSH 1.821 1.147 1.154   HDL  --    --  52   Cholesterol  --   --  244 H   Triglycerides  --   --  74   LDL Cholesterol  --   --  177.2 H   HDL/Cholesterol Ratio  --   --  21.3   Non-HDL Cholesterol  --   --  192   Total Cholesterol/HDL Ratio  --   --  4.7     TSH:  Recent Labs   Lab 02/11/22  2248 09/27/22  1201 11/17/22  0842   TSH 1.821 1.147 1.154     A1C:  Recent Labs   Lab 11/17/22  0842   Hemoglobin A1C 6.0 H       Assessment/Plan     Wilbur Diop is a 42 y.o.male with:    1. Hypogonadism in male  - Ambulatory referral/consult to Endocrinology; Future  - TESTOSTERONE; Future  - Ambulatory referral/consult to Urology; Future  Refer to endo or urology and check repeat value.    2. Impaired fasting glucose  Counseled on diet.     Chronic conditions status updated as per HPI.  Other than changes above, cont current medications and maintain follow up with specialists.  No follow-ups on file.    Future Appointments   Date Time Provider Department Center   1/17/2023  8:40 AM LAB, APPOINTMENT [facility] INTMED [facility] LAB IM Gerald Reddy PCW   1/17/2023  9:30 AM [facility] OIC-CT1 500 LB LIMIT [facility] IC [facility]   1/24/2023 10:00 AM 3PREP, GERALD CHAYO [facility] SSCU [facility]   1/24/2023 12:00 PM INPATIENT, RAFAEL [facility] ECHOSTR Gerald Reddy   4/21/2023 10:00 AM Carlos Daugherty MD [facility] Gerald Naranjo MD  [facility]                Answers submitted by the patient for this visit:  Review of Systems Questionnaire (Submitted on 12/12/2022)  activity change: No  unexpected weight change: No  rhinorrhea: No  trouble swallowing: No  visual disturbance: No  chest tightness: No  polyuria: No  difficulty urinating: No  joint swelling: No  arthralgias: No  confusion: Yes  dysphoric mood: Yes

## 2022-12-12 NOTE — Clinical Note
Please assist with scheduling repeat testosterone and referral to endocrinology and urology, whichever is earlier available.

## 2022-12-13 DIAGNOSIS — I48.0 PAF (PAROXYSMAL ATRIAL FIBRILLATION): ICD-10-CM

## 2022-12-13 RX ORDER — FLECAINIDE ACETATE 100 MG/1
100 TABLET ORAL EVERY 12 HOURS
Qty: 180 TABLET | Refills: 3 | Status: SHIPPED | OUTPATIENT
Start: 2022-12-13 | End: 2023-06-29

## 2022-12-16 ENCOUNTER — LAB VISIT (OUTPATIENT)
Dept: LAB | Facility: HOSPITAL | Age: 42
End: 2022-12-16
Attending: INTERNAL MEDICINE
Payer: MEDICAID

## 2022-12-16 DIAGNOSIS — E29.1 HYPOGONADISM IN MALE: ICD-10-CM

## 2022-12-16 LAB — TESTOST SERPL-MCNC: 202 NG/DL (ref 304–1227)

## 2022-12-16 PROCEDURE — 84403 ASSAY OF TOTAL TESTOSTERONE: CPT | Performed by: INTERNAL MEDICINE

## 2022-12-16 PROCEDURE — 36415 COLL VENOUS BLD VENIPUNCTURE: CPT | Performed by: INTERNAL MEDICINE

## 2023-01-15 ENCOUNTER — PATIENT MESSAGE (OUTPATIENT)
Dept: MEDSURG UNIT | Facility: HOSPITAL | Age: 43
End: 2023-01-15
Payer: MEDICAID

## 2023-01-17 ENCOUNTER — HOSPITAL ENCOUNTER (OUTPATIENT)
Dept: RADIOLOGY | Facility: HOSPITAL | Age: 43
Discharge: HOME | End: 2023-01-17
Attending: INTERNAL MEDICINE
Payer: MEDICAID

## 2023-01-17 DIAGNOSIS — I48.0 PAF (PAROXYSMAL ATRIAL FIBRILLATION): ICD-10-CM

## 2023-01-17 PROCEDURE — 71275 CTA CHEST NON CORONARY (PE STUDIES): ICD-10-PCS | Mod: 26,,, | Performed by: RADIOLOGY

## 2023-01-17 PROCEDURE — 25500020 PHARM REV CODE 255: Performed by: INTERNAL MEDICINE

## 2023-01-17 PROCEDURE — 71275 CT ANGIOGRAPHY CHEST: CPT | Mod: 26,,, | Performed by: RADIOLOGY

## 2023-01-17 PROCEDURE — 71275 CT ANGIOGRAPHY CHEST: CPT | Mod: TC

## 2023-01-17 RX ADMIN — IOHEXOL 100 ML: 350 INJECTION, SOLUTION INTRAVENOUS at 09:01

## 2023-01-18 ENCOUNTER — PATIENT MESSAGE (OUTPATIENT)
Dept: MEDSURG UNIT | Facility: HOSPITAL | Age: 43
End: 2023-01-18
Payer: MEDICAID

## 2023-01-20 NOTE — PROGRESS NOTES
Subjective:      Patient ID: Wilbur Diop Jr. is a 42 y.o. male.    Chief Complaint:  No chief complaint on file.    History of Present Illness  Wilbur Diop Jr. With hypogonadism, RACHEL, ADHD, PAF, presents today for evaluation of hypogonadism. This is his first visit with me.     The patient location is: at home  The chief complaint leading to consultation is: hypogonadism    Visit type: audiovisual    Face to Face time with patient: 27 minutes   45 minutes of total time spent on the encounter, which includes face to face time and non-face to face time preparing to see the patient (eg, review of tests), Obtaining and/or reviewing separately obtained history, Documenting clinical information in the electronic or other health record, Independently interpreting results (not separately reported) and communicating results to the patient/family/caregiver, or Care coordination (not separately reported).    Each patient to whom he or she provides medical services by telemedicine is:  (1) informed of the relationship between the physician and patient and the respective role of any other health care provider with respect to management of the patient; and (2) notified that he or she may decline to receive medical services by telemedicine and may withdraw from such care at any time.    Patient presents for evaluation of male hypogonadism.     Developmental history:   Normal testis descent, puberty onset at 12-13, first noticed body and facial hair at age 13 and reached maximal adult height at age [age]     No family history of hypogonadism.  Normal sense of smell.    Denies recent severe/critical illness, no STD's, no orchitis or hx of orchiectomy, no radiation exposure.    Denies chronic steroid or opiate use. + past use of marijuana. Denies use of lavender or tea tree oil. Denies use of anabolic steroids. Does not drink and excessive amount of EtOH.    Mood depressed.   + excessive daytime sleepiness.  Has not  been checked for sleep apnea and does report snoring    Libido is decreased   States he has had difficulty with erections for the last year   States sometimes getting semi aroused. Has issues with ejaculation while he is not erect     Does not get early AM spontaneous erections.   No decrease in shaving frequency.   No height loss or history of fractures.   Denies hot flashes or   + night sweats.     No problems with fertility in the past. Has 1 child. Does not want anymore kids.     Denies breast tenderness or   + gynecomastia but attributes to overweight   Some headaches but relates to stress   + blurry vision.   No nausea/vomiting, weight loss or dizziness/lightheadedness.   Concussion at 11-9 y/o - jumped off stage and hit head    PSA level -Nov 2022 normal. Denies history of prostate cancer.   + has history of prostatitis- States a few years ago     Latest Reference Range & Units 11/17/22 08:42 12/16/22 08:29   Testosterone, Total 304 - 1227 ng/dL 186 (L) 202 (L)   (L): Data is abnormally low    He will be having cardiac ablation tomorrow for Afib    Also with prediabetes,     Denies FH of diabetes  He is trying to exercise but would like to do more.   He is snacking in the middle of the night.     Lab Results   Component Value Date    LABA1C 6.0 (H) 07/28/2016    HGBA1C 6.0 (H) 11/17/2022     Review of Systems  As above     Lab Review:   Lab Results   Component Value Date    HGBA1C 6.0 (H) 11/17/2022    HGBA1C 5.5 11/13/2019    HGBA1C 5.7 (H) 01/30/2019      Lab Results   Component Value Date    CHOL 244 (H) 11/17/2022    HDL 52 11/17/2022    LDLCALC 177.2 (H) 11/17/2022    TRIG 74 11/17/2022    CHOLHDL 21.3 11/17/2022     Lab Results   Component Value Date     01/17/2023    K 4.4 01/17/2023     01/17/2023    CO2 25 01/17/2023     (H) 01/17/2023    BUN 18 01/17/2023    CREATININE 1.0 01/17/2023    CALCIUM 10.2 01/17/2023    PROT 7.6 11/17/2022    ALBUMIN 4.1 11/17/2022    BILITOT 0.7  11/17/2022    ALKPHOS 69 11/17/2022    AST 22 11/17/2022    ALT 36 11/17/2022    ANIONGAP 13 01/17/2023    ESTGFRAFRICA >60.0 03/07/2022    EGFRNONAA >60.0 03/07/2022    TSH 1.154 11/17/2022     No results found for: [id number]  Assessment and Plan     1. Hypogonadism in male  CBC Auto Differential    Follicle Stimulating Hormone    Luteinizing Hormone    Prolactin    MRI Pituitary W W/O Contrast    Ambulatory referral/consult to Endocrinology    Testosterone Panel    Ambulatory referral/consult to Sleep Disorders      2. Prediabetes            Hypogonadism in male  -- Repeat Testosterone paired with LH, PRL to evaluate whether this is primary or secondary  -- Apparent risk factors identified at this time. Risk factors: obesity, probable sleep apnea,   -- Effects of obesity on androgen levels discussed with patient  -- Testosterone contraindications: Heart failure, Prostate cancer, BUDDY, Untreated-BPH     Considering testosterone was less than 200 when initially checked, will check MRI of brain. He has claustrophobia. Will allow xanax x1 dose prior    Considering he is waking up fatigued and snoring, will consult sleep medicine.  He would consider injections but will be seeing urology. He may consider testosterone undecenoate.     -- We discussed extensively about benefits and risks of of testosterone replacement including benefits as improved sexual functions and muscle strength, etc, and risks as erythrocytosis, abnormal liver function, worsening of prostate symptoms or prostate cancer, exacerbation of CHF or BUDDY, etc.   -- We also discussed about recent FDA black-box warning of thromboembolic events while on T replacement, which is independent of erythrocytosis.     -- We also discussed about potentially decreased sperm count, decreased testicular size and reduced fertility while on testosterone replacement.   -- We further discussed recent reports of the association of CV events including MI with T treatment as  seen in the MIHIR trial and reported in a few meta-analysis.   -- We also discussed about the recent findings from the Testosterone trial. There is a benefit in sexual health and mood and depressive symptoms. No increase in CV event was noticed. However, the T trial was not powered enough to address the CV safety of testosterone replacement.       Prediabetes  A1c 6.0% in Nov 2022  Check A1c in 3 months   Will give him 3 months of lifestyle changes and then consider addition of Metformin.      Follow up in about 3 months (around 4/23/2023).

## 2023-01-22 NOTE — ASSESSMENT & PLAN NOTE
-- Repeat Testosterone paired with LH, PRL to evaluate whether this is primary or secondary  -- Apparent risk factors identified at this time. Risk factors: obesity, probable sleep apnea,   -- Effects of obesity on androgen levels discussed with patient  -- Testosterone contraindications: Heart failure, Prostate cancer, BUDDY, Untreated-BPH     Considering testosterone was less than 200 when initially checked, will check MRI of brain. He has claustrophobia. Will allow xanax x1 dose prior    Considering he is waking up fatigued and snoring, will consult sleep medicine.  He would consider injections but will be seeing urology. He may consider testosterone undecenoate.     -- We discussed extensively about benefits and risks of of testosterone replacement including benefits as improved sexual functions and muscle strength, etc, and risks as erythrocytosis, abnormal liver function, worsening of prostate symptoms or prostate cancer, exacerbation of CHF or BUDDY, etc.   -- We also discussed about recent FDA black-box warning of thromboembolic events while on T replacement, which is independent of erythrocytosis.     -- We also discussed about potentially decreased sperm count, decreased testicular size and reduced fertility while on testosterone replacement.   -- We further discussed recent reports of the association of CV events including MI with T treatment as seen in the MIHIR trial and reported in a few meta-analysis.   -- We also discussed about the recent findings from the Testosterone trial. There is a benefit in sexual health and mood and depressive symptoms. No increase in CV event was noticed. However, the T trial was not powered enough to address the CV safety of testosterone replacement.

## 2023-01-23 ENCOUNTER — TELEPHONE (OUTPATIENT)
Dept: ELECTROPHYSIOLOGY | Facility: CLINIC | Age: 43
End: 2023-01-23
Payer: MEDICAID

## 2023-01-23 ENCOUNTER — PATIENT MESSAGE (OUTPATIENT)
Dept: ENDOCRINOLOGY | Facility: CLINIC | Age: 43
End: 2023-01-23

## 2023-01-23 ENCOUNTER — OFFICE VISIT (OUTPATIENT)
Dept: ENDOCRINOLOGY | Facility: CLINIC | Age: 43
End: 2023-01-23
Payer: MEDICAID

## 2023-01-23 DIAGNOSIS — R73.03 PREDIABETES: ICD-10-CM

## 2023-01-23 DIAGNOSIS — E29.1 HYPOGONADISM IN MALE: Primary | ICD-10-CM

## 2023-01-23 PROCEDURE — 1160F PR REVIEW ALL MEDS BY PRESCRIBER/CLIN PHARMACIST DOCUMENTED: ICD-10-PCS | Mod: CPTII,95,, | Performed by: NURSE PRACTITIONER

## 2023-01-23 PROCEDURE — 1159F PR MEDICATION LIST DOCUMENTED IN MEDICAL RECORD: ICD-10-PCS | Mod: CPTII,95,, | Performed by: NURSE PRACTITIONER

## 2023-01-23 PROCEDURE — 99204 PR OFFICE/OUTPT VISIT, NEW, LEVL IV, 45-59 MIN: ICD-10-PCS | Mod: 95,,, | Performed by: NURSE PRACTITIONER

## 2023-01-23 PROCEDURE — 1159F MED LIST DOCD IN RCRD: CPT | Mod: CPTII,95,, | Performed by: NURSE PRACTITIONER

## 2023-01-23 PROCEDURE — 1160F RVW MEDS BY RX/DR IN RCRD: CPT | Mod: CPTII,95,, | Performed by: NURSE PRACTITIONER

## 2023-01-23 PROCEDURE — 99204 OFFICE O/P NEW MOD 45 MIN: CPT | Mod: 95,,, | Performed by: NURSE PRACTITIONER

## 2023-01-23 NOTE — H&P
Electrophysiology Pre Procedure H&P    HPI:   42 y.o. M  anxiety, depression; on meds  AFL, s/p RF CTI  PAF, on flecainide     Had racing heart rate for >2 days. Went to ER 22; diagnosed with SVT but ECG appears c/w typical AFL with 2:1 conduction. Reportedly CSM had no effect, and arrhythmia terminated following 12 mg adenosine. No ECG of termination.  Felt well since.     Went to ER recently. Again c/w typical AFL. Rx or AFL with RVR was diltiazem; repeat ECG showed NSR.     Update 10/2022:  RF of AFL 3/22. During that case, AFL degenerated to AF at times. SVT was not inducible with up to DAES.  Subsequently, had PAF requiring hospital visit and then monitoring showed 4% AF. Started flecainide.  Since flecainide, doing well.     My interpretation of recent ECG is NSR    Interval History:  Patient denies chest pain fevers chills SOB> Last dose anticoagulation last night  Past Medical History:   Diagnosis Date    ADHD (attention deficit hyperactivity disorder)     Allergy     Anxiety     Atrial fibrillation     Atrial flutter     Depression     History of psychiatric hospitalization      for depression    Hx of psychiatric care     Psychiatric problem     Supraventricular tachycardia     Therapy         Social History     Socioeconomic History    Marital status:     Number of children: 2   Tobacco Use    Smoking status: Former     Types: Cigarettes     Quit date: 10/22/2004     Years since quittin.2    Smokeless tobacco: Never   Substance and Sexual Activity    Alcohol use: Yes     Alcohol/week: 1.7 standard drinks     Types: 2 Standard drinks or equivalent per week     Comment: once a month; socially    Drug use: No    Sexual activity: Yes     Partners: Female   Other Topics Concern    Patient feels they ought to cut down on drinking/drug use No    Patient annoyed by others criticizing their drinking/drug use No    Patient has felt bad or guilty about drinking/drug use No    Patient has had a  "drink/used drugs as an eye opener in the AM No        Family History   Problem Relation Age of Onset    Depression Mother     Anxiety disorder Mother     No Known Problems Father     No Known Problems Sister     No Known Problems Brother     No Known Problems Maternal Aunt     No Known Problems Maternal Uncle     No Known Problems Paternal Aunt     No Known Problems Paternal Uncle     No Known Problems Maternal Grandmother     Cancer Maternal Grandfather     No Known Problems Paternal Grandmother     No Known Problems Paternal Grandfather     Amblyopia Neg Hx     Blindness Neg Hx     Cataracts Neg Hx     Diabetes Neg Hx     Glaucoma Neg Hx     Hypertension Neg Hx     Macular degeneration Neg Hx     Retinal detachment Neg Hx     Strabismus Neg Hx     Stroke Neg Hx     Thyroid disease Neg Hx         No current facility-administered medications for this encounter.      ROS:  Constitutional: (-)fevers, (-)chills, (-)night sweats  HEENT: (-) headaches (-) lightheadedness (-) blurry vision  Cardiovascular: (-)chest pain (-)paroxysmal nocturnal dyspnea (-)orthopnea  Respiratory: (-)shortness of breath, (-)dyspnea on exertion (-)hemoptysis  Gastrointestinal: (-)abdominal pain (-)nausea (-)vomiting (-)tarry stools  Musculoskeletal: (-)arthralgias (-)limited range of motion  Neurologic: (-)parasthesias (-)mood disorder (-)anxiety  Endo: (-)polyuria (-)polydipsia (-)heat/cold intolerance  Skin: (-)rash     Vitals:    01/24/23 0938 01/24/23 0939   BP: 123/82 119/78   BP Location: Left arm Right arm   Patient Position: Lying Lying   Pulse: 66    Resp: 20    Temp: 98.2 °F (36.8 °C)    TempSrc: Temporal    SpO2: 95%    Weight: 108.4 kg (239 lb)    Height: 5' 9" (1.753 m)      Physical Exam:   Gen: no acute distress, pleasant patient answering questions appropriately  HEENT: extra-ocular muscles intact, normocephalic-atraumatic  Neck: no jugular venous distension, no lymphadenopathy, no thyromegaly, no carotid bruits  CVS: regular " rate and rhythm, S1S2 normal, no murmurs/rubs/gallops  CHEST: clear to auscultation bilaterally, no wheezes/rales/ronchi  ABD: Soft, non-tender, nondistended, bowel sounds present  EXT: No Edema, 2+ pulses bilaterally (radial, femoral, dorsales pedis, posterior tibial)  NEURO: awake, alert, and oriented   Skin: No rash     Review of Labs:   WNL      Most recent ECG  nsr     Assessment/Plan:  Wilbur Diop Jr. is a 42 y.o. male with AF  -[place]ryo PVI

## 2023-01-23 NOTE — PATIENT INSTRUCTIONS
Low testosterone   Check testosterone labs --CBC LH FSH testosterone prolactin    Considering testosterone was less than 200 when checked, will check MRI of brain. He has claustrophobia. Will allow xanax x1 dose prior    Considering he is waking up fatigued and snoring, will consult sleep medicine.  Below is the information for testosterone replacement therapy:     Testosterone: Patient drug information     Brand Names: [place]  Androderm; AndroGel; AndroGel Pump; Aveed; Axiron [DSC]; Depo-Testosterone; Fortesta; Jatenzo; Natesto; Striant [DSC]; Testim; Testopel; Vogelxo; Vogelxo Pump; Xyosted     Warning  All products:   High blood pressure has happened with this drug. Have your blood pressure checked as you have been told by your doctor.   High blood pressure can raise the chance of heart attack, stroke, or death from heart disease. If you have high blood pressure or heart disease, talk with your doctor.  Testosterone undecanoate injection:   A very bad lung problem has happened with this drug. Allergic reactions have also happened with this drug. Sometimes, allergic reactions have been life-threatening. These reactions have happened while this drug was given and right after. These reactions can happen with any dose of this drug.   You will be watched closely by your doctor for 30 minutes after each dose. Call your doctor right away if you have chest pain, cough or the need to cough, dizziness, passing out, shortness of breath, sweating, or throat tightness.     Skin gel, skin solution:   Wash the site where the drug was used before it touches anyone else's skin.   Do not let your skin that the gel or solution was used on touch any children's or women's skin. Cover treated part with clothes. Signs of puberty may happen in children.   If a woman or child touches the gel, she/he needs to wash the skin with soap and water.  What is this drug used for?   It is used to treat low testosterone levels.   It is used in certain  children when puberty is delayed.   It is used to treat breast cancer in women.   It may be given to you for other reasons. Talk with the doctor.     What do I need to tell my doctor BEFORE I take this drug?  All products:   If you have an allergy to testosterone or any other part of this drug.   If you are allergic to this drug; any part of this drug; or any other drugs, foods, or substances. Tell your doctor about the allergy and what signs you had.   If you are male and have breast or prostate cancer.   If you have any of these health problems: Heart disease, kidney disease, or liver disease.  All injection products:   If you are a woman. Not all injection products are approved for use in women. Talk with your doctor to see if this product may be used in women. If you are a woman using this drug, talk with your doctor if you are pregnant, plan on getting pregnant, or are breast-feeding.   If the patient is a child. Not all injection products are approved for use in children. Talk with the doctor to see if this product may be used in children.  All products other than injection:   If you are a woman. This drug is not approved for use in women. If you are a woman using this drug, talk with your doctor if you are pregnant, plan on getting pregnant, or are breast-feeding.   If the patient is a child. Do not give this drug to a child.     What are some things I need to know or do while I take this drug?  All products:   Tell all of your health care providers that you take this drug. This includes your doctors, nurses, pharmacists, and dentists.   If you have high blood sugar (diabetes), you will need to watch your blood sugar closely.   There may be a higher chance of prostate cancer. Talk with the doctor.   If you have an enlarged prostate, your signs can get worse while you use this drug. Call your doctor if this happens to you.   If you have sleep apnea, talk with your doctor. Sometimes, sleep apnea has gotten  worse in people using testosterone.   Treatment with this drug may lead to higher cholesterol and triglycerides. The effect of these changes on heart health is not known. Talk with the doctor.   Have blood work checked as you have been told by the doctor. Talk with the doctor.   This drug may affect certain lab tests. Tell all of your health care providers and lab workers that you take this drug.   Blood clots have happened with this drug. Tell your doctor if you have ever had a blood clot. Talk with your doctor.   This drug is an anabolic steroid. Anabolic steroid drugs have been abused and misused before. Anabolic steroid abuse can lead to dependence and very bad health problems. These health problems include heart or blood vessel problems, stroke, liver problems, and mental or mood problems. Talk with the doctor.   High calcium levels have happened with drugs like this one in some people with cancer. Call your doctor right away if you have signs of high calcium levels like weakness, confusion, feeling tired, headache, upset stomach or throwing up, constipation, or bone pain.   If you are 65 or older, use this drug with care. You could have more side effects.   This drug is not approved for treating low testosterone levels caused by getting older. Talk with your doctor.   This drug may affect sperm in men. This may affect being able to father a child. Talk with the doctor.     Skin gel, skin solution:   Tell the doctor right away if a child or woman touches the gel or solution and has any bad effects. In children, these are forceful actions, enlarged sex organs, and early growth of pubic hair. In women, these are a deep voice, change in body hair, or pimples.   If a pregnant woman touches the gel or solution, call the doctor right away.  Nose gel:   If you have very bad nose problems like runny nose, stuffy nose, or postnasal drip, or if you have these problems and they do not go away, talk with your doctor. You  may need to stop this drug or switch to another product.     Patch:   The patch may have metal. Take off the patch before an MRI.     All injection products:   Some products have benzyl alcohol. Do not give a product that has benzyl alcohol in it to a  or infant. Talk with the doctor to see if this product has benzyl alcohol in it.   Some products may be used in children. Growth in children and teens may be affected in some cases. Children may need regular growth checks. If you have questions, talk with your child's doctor.     What are some side effects that I need to call my doctor about right away?  WARNING/CAUTION: Even though it may be rare, some people may have very bad and sometimes deadly side effects when taking a drug. Tell your doctor or get medical help right away if you have any of the following signs or symptoms that may be related to a very bad side effect:  All products:   Signs of an allergic reaction, like rash; hives; itching; red, swollen, blistered, or peeling skin with or without fever; wheezing; tightness in the chest or throat; trouble breathing, swallowing, or talking; unusual hoarseness; or swelling of the mouth, face, lips, tongue, or throat.   Signs of high blood pressure like very bad headache or dizziness, passing out, or change in eyesight.   For males, erections (hard penis) that happen often or that last a long time.   Trouble passing urine, pain when passing urine, passing urine in a weak stream or drips, or passing urine more often.   Not able to control passing urine.   New or worse behavior or mood changes like depression or thoughts of suicide.   Enlarged breasts.   Breast pain.   Shortness of breath, a big weight gain, or swelling in the arms or legs.   Upset stomach or throwing up.   Trouble breathing when sleeping.   Feeling sleepy during the day.   Weakness on 1 side of the body, trouble speaking or thinking, change in balance, drooping on one side of the face, or  blurred eyesight.   Change in color of skin.   Change in size or shape of testicles.   Call your doctor right away if you have signs of a blood clot like chest pain or pressure; coughing up blood; shortness of breath; swelling, warmth, numbness, change of color, or pain in a leg or arm; or trouble speaking or swallowing.   Liver problems have happened with drugs like this one. Sometimes, this has been deadly. Call your doctor right away if you have signs of liver problems like dark urine, feeling tired, not hungry, upset stomach or stomach pain, light-colored stools, throwing up, or yellow skin or eyes.     What are some other side effects of this drug?  All drugs may cause side effects. However, many people have no side effects or only have minor side effects. Call your doctor or get medical help if any of these side effects or any other side effects bother you or do not go away:     All other products:   Headache.   Pimples (acne).   Diarrhea.   Emotional ups and downs.   Feeling tired or weak.   Trouble sleeping.   Irritation where this drug is used.     Patch:   Back pain.  These are not all of the side effects that may occur. If you have questions about side effects, call your doctor. Call your doctor for medical advice about side effects.  You may report side effects to your national health agency.  How is this drug best taken?  Use this drug as ordered by your doctor. Read all information given to you. Follow all instructions closely.     Skin gel, skin solution:   Do not take this drug by mouth. Use on your skin only.   Wash your hands before and after use.   Use this drug at the same time of day.   Not all products are the same strength. Read and follow label carefully.   Do not get this drug on other parts of your body or on other people.   Do not put on the genitals.   Keep out of your eyes.   Some products come in pumps. Some products come in packets. If you are using a pump, you will need to prime it  before you use it the first time. Prime the pump as you are told in the package insert.   After putting this drug on, you will need to wait some time before you bathe, shower, or swim. Be sure you know how long you need to wait. Read the package insert for more details.   Let dry before covering with clothing.   Avoid fire, flames, or smoking until dry.     Skin gel:   Put on clean, dry, healthy skin.   Certain products are to be put on certain parts of the body. Be sure you know where to put this drug. Read the package insert for more details.     Skin solution:   Put on clean, dry, healthy skin in the armpit using the applicator.   Do not use your fingers or hand to rub this drug into the skin.   Change armpits with each dose.   Allow skin to dry between uses.   If you use an antiperspirant or deodorant, put on at least 2 minutes before you put on this drug.     Patch:   Do not use patches that are cut or do not look right.   Put patch on clean, dry, healthy skin on back, belly, thigh, or upper arm.   Do not put on an area that is bony. Do not put on an area that will have pressure on it for a long time while sleeping or sitting.   Do not put on the genitals.   Do not put on skin that is oily, sweats a lot, or has hair on it. The patch may not stick well.   Put on a new patch at the same time every night. Be sure to take off the old patch before you put on the new one.   Move the patch site with each new patch. Do not put on the same site for 7 days.   Do not bathe, shower, or swim for 3 hours after putting on.   Do not cover the patch with other bandages or tape. If the patch does not stick well, talk with your doctor or pharmacist.   If your patch falls off before noon, put on a new patch and wear it until you put on a new patch at your normal time.   If your patch falls off after noon, do not put on a new one. Wait and put on a new patch at your normal time.   After you take off a skin patch, be sure to fold  the sticky sides of the patch to each other. Throw away used patches where children and pets cannot get to them.  Cheek tablet:   Do not take this drug out of the blister pack until you are ready to take it. Take this drug right away after opening the blister pack. Do not store the removed drug for future use.   Wash your hands before use.   Dry your hands and place the tablet in your mouth above the incisor tooth between the upper cheek and gum. Leave the tablet in place until it dissolves.   Do not chew or swallow.   Change the side of mouth with each dose.   Do not take out until it is time for your next dose.   Make sure drug is still in place after eating, drinking, brushing your teeth, or using mouthwash.   If the drug does not stick or falls out, follow what your doctor has told you or read the package insert. If you are not sure what to do, talk with your doctor.   To remove tablet, gently slide it down from the gum toward the tooth to keep from scratching the gum.     All injection products:   Some products need to be given into the fatty part of the skin. Some products need to be given into a muscle. Talk with the doctor or pharmacist if you are not sure how to use this drug.   If you will be giving yourself the shot, your doctor or nurse will teach you how to give the shot.   Wash your hands before use.   Be sure you know where to give the shot. If you are not sure where to give the shot, talk with the doctor.   Do not give into skin that is irritated, tender, bruised, red, scaly, hard, scarred, or has stretch marks.   Do not use if the solution is cloudy, leaking, or has particles.   This drug is colorless to a faint yellow. Do not use if the solution changes color.   Throw away needles in a needle/sharp disposal box. Do not reuse needles or other items. When the box is full, follow all local rules for getting rid of it. Talk with a doctor or pharmacist if you have any questions.     What do I do if I  miss a dose?  Testosterone undecanoate injection:   Call your doctor to find out what to do.     All other products:   Use a missed dose as soon as you think about it.   If it is close to the time for your next dose, skip the missed dose and go back to your normal time.   Do not use 2 doses at the same time or extra doses.  How do I store and/or throw out this drug?  All products other than injection:   Store at room temperature. Do not freeze.   Store in a dry place. Do not store in a bathroom.     Skin gel, skin solution:   Protect from heat or open flame.     Patch:   Store in pouch until ready for use.  All injection products:   If you need to store this drug at home, talk with your doctor, nurse, or pharmacist about how to store it.     All products:   Store this drug in a safe place where children cannot see or reach it, and where other people cannot get to it. A locked box or area may help keep this drug safe. Keep all drugs away from pets.   Throw away unused or  drugs. Do not flush down a toilet or pour down a drain unless you are told to do so. Check with your pharmacist if you have questions about the best way to throw out drugs. There may be drug take-back programs in your area.  General drug facts   If your symptoms or health problems do not get better or if they become worse, call your doctor.   Do not share your drugs with others and do not take anyone else's drugs.   Some drugs may have another patient information leaflet. If you have any questions about this drug, please talk with your doctor, nurse, pharmacist, or other health care provider.   If you think there has been an overdose, call your poison control center or get medical care right away. Be ready to tell or show what was taken, how much, and when it happened.      Prediabetes   Check A1c in 3 months    Will give him 3 months of lifestyle changes and then consider addition of Metformin.     Snacks can be an important part of a  balanced, healthy meal plan. They allow you to eat more frequently, feeling full and satisfied throughout the day. Also, they allow you to spread carbohydrates evenly, which may stabilize blood sugars.  Plus, snacks are enjoyable!     The amount of carbohydrate needed at snacks varies. Generally, about 15-30 grams of carbohydrate per snack is recommended.  Below you will find some tasty treats.       0-5 gm carb  Crystal Light  Vitamin Water Zero  Herbal tea, unsweetened  2 tsp peanut butter on celery  1./2 cup sugar-free jell-o  1 sugar-free popsicle  ¼ cup blueberries  8oz Blue Sun unsweetened almond milk  5 baby carrots & celery sticks, cucumbers, bell peppers dipped in ¼ cup salsa, 2Tbsp light ranch dressing or 2Tbsp plain Greek yogurt  10 Goldfish crackers  ½ oz low-fat cheese or string cheese  1 closed handful of nuts, unsalted  1 Tbsp of sunflower seeds, unsalted  1 cup Smart Pop popcorn  1 whole grain brown rice cake        15 gm carb  1 small piece of fruit or ½ banana or 1/2 cup lite canned fruit  3 cindy cracker squares  3 cups Smart Pop popcorn, top spray butter, Moy lite salt or cinnamon and Truvia  5 Vanilla Wafers  ½ cup low fat, no added sugar ice cream or frozen yogurt (Blue bell, Blue Bunny, Weight Watchers, Skinny Cow)  ½ turkey, ham, or chicken sandwich  ½ c fruit with ½ c Cottage cheese  4-6 unsalted wheat crackers with 1 oz low fat cheese or 1 tbsp peanut butter   30-45 goldfish crackers (depending on flavor)   7-8 Restorationist mini brown rice cakes (caramel, apple cinnamon, chocolate)   12 Restorationist mini brown rice cakes (cheddar, bbq, ranch)   1/3 cup hummus dip with raw veg  1/2 whole wheat manuel, 1Tbsp hummus  Mini Pizza (1/2 whole wheat English muffin, low-fat  cheese, tomato sauce)  100 calorie snack pack (Oreo, Chips Ahoy, Ritz Mix, Baked Cheetos)  4-6 oz. light or Greek Style yogurt (Chobani, Yoplait, Okios, [place])  ½ cup sugar-free pudding    6 in. wheat tortilla or manuel oven  toasted chips (topped with spray butter flavoring, cinnamon, Truvia OR spray butter, garlic powder, chili powder)   18 BBQ Popchips (available at [employer], [employer], Fresh Market)

## 2023-01-23 NOTE — Clinical Note
Consult sleep medicine  Check MRI  Labs next Monday at [facility] and [facility]

## 2023-01-23 NOTE — TELEPHONE ENCOUNTER
Spoke to patient    CONFIRMED procedure arrival time of 10am    Reiterated instructions including:  -Directions to check in desk  -NPO after midnight night prior to procedure  -High importance of HOLDING flecainide, last dose was am dose on 1/22-Dr Daugherty made aware ok to proceed  -Confirmed compliance of taking xarelto daily with evening meals  -Pre-procedure LABS reviewed no alerts noted   -Confirmed absence or presence of implanted device/stimulator none  -Confirmed no fever, cough, or shortness of breath in the past 30 days  -Confirmed no redness, rash, irritation, or yeast infection to groin area.   -Reviewed current visitor policy    Patient verbalized understanding of above and appreciated the call.

## 2023-01-23 NOTE — ASSESSMENT & PLAN NOTE
A1c 6.0% in Nov 2022  Check A1c in 3 months   Will give him 3 months of lifestyle changes and then consider addition of Metformin.

## 2023-01-24 ENCOUNTER — HOSPITAL ENCOUNTER (OUTPATIENT)
Dept: CARDIOLOGY | Facility: HOSPITAL | Age: 43
Discharge: HOME | End: 2023-01-24
Attending: INTERNAL MEDICINE | Admitting: INTERNAL MEDICINE
Payer: MEDICAID

## 2023-01-24 ENCOUNTER — ANESTHESIA EVENT (OUTPATIENT)
Dept: MEDSURG UNIT | Facility: HOSPITAL | Age: 43
End: 2023-01-24
Payer: MEDICAID

## 2023-01-24 ENCOUNTER — HOSPITAL ENCOUNTER (OUTPATIENT)
Facility: HOSPITAL | Age: 43
Discharge: HOME | End: 2023-01-25
Attending: INTERNAL MEDICINE | Admitting: INTERNAL MEDICINE
Payer: MEDICAID

## 2023-01-24 ENCOUNTER — ANESTHESIA (OUTPATIENT)
Dept: MEDSURG UNIT | Facility: HOSPITAL | Age: 43
End: 2023-01-24
Payer: MEDICAID

## 2023-01-24 VITALS
SYSTOLIC BLOOD PRESSURE: 119 MMHG | DIASTOLIC BLOOD PRESSURE: 78 MMHG | BODY MASS INDEX: 35.4 KG/M2 | WEIGHT: 239 LBS | HEIGHT: 69 IN

## 2023-01-24 DIAGNOSIS — I48.0 PAROXYSMAL ATRIAL FIBRILLATION: Primary | ICD-10-CM

## 2023-01-24 DIAGNOSIS — I48.0 PAF (PAROXYSMAL ATRIAL FIBRILLATION): ICD-10-CM

## 2023-01-24 DIAGNOSIS — Z86.79 STATUS POST ABLATION OPERATION FOR ARRHYTHMIA: ICD-10-CM

## 2023-01-24 DIAGNOSIS — Z98.890 STATUS POST ABLATION OPERATION FOR ARRHYTHMIA: ICD-10-CM

## 2023-01-24 DIAGNOSIS — I48.91 ATRIAL FIBRILLATION: ICD-10-CM

## 2023-01-24 DIAGNOSIS — I49.9 ARRHYTHMIA: ICD-10-CM

## 2023-01-24 LAB
ASCENDING AORTA: 2.9 CM
BSA FOR ECHO PROCEDURE: 2.3 M2
DOP CALC LVOT AREA: 4.2 CM2
DOP CALC LVOT DIAMETER: 2.3 CM
EJECTION FRACTION: 65 %
POCT GLUCOSE: 108 MG/DL (ref 70–110)
RA PRESSURE: 3 MMHG
SINUS: 3.1 CM
STJ: 2.9 CM

## 2023-01-24 PROCEDURE — 25000003 PHARM REV CODE 250: Performed by: ANESTHESIOLOGY

## 2023-01-24 PROCEDURE — 37000009 HC ANESTHESIA EA ADD 15 MINS: Performed by: INTERNAL MEDICINE

## 2023-01-24 PROCEDURE — 93010 ELECTROCARDIOGRAM REPORT: CPT | Mod: 59,,, | Performed by: INTERNAL MEDICINE

## 2023-01-24 PROCEDURE — 25000003 PHARM REV CODE 250: Performed by: INTERNAL MEDICINE

## 2023-01-24 PROCEDURE — 99499 NO LOS: ICD-10-PCS | Mod: ,,, | Performed by: INTERNAL MEDICINE

## 2023-01-24 PROCEDURE — 25500020 PHARM REV CODE 255: Performed by: INTERNAL MEDICINE

## 2023-01-24 PROCEDURE — 25000003 PHARM REV CODE 250: Performed by: NURSE ANESTHETIST, CERTIFIED REGISTERED

## 2023-01-24 PROCEDURE — 93656 COMPRE EP EVAL ABLTJ ATR FIB: CPT | Mod: ,,, | Performed by: INTERNAL MEDICINE

## 2023-01-24 PROCEDURE — 93656 COMPRE EP EVAL ABLTJ ATR FIB: CPT | Performed by: INTERNAL MEDICINE

## 2023-01-24 PROCEDURE — 93010 EKG 12-LEAD: ICD-10-PCS | Mod: 59,,, | Performed by: INTERNAL MEDICINE

## 2023-01-24 PROCEDURE — 93325 DOPPLER ECHO COLOR FLOW MAPG: CPT

## 2023-01-24 PROCEDURE — C1894 INTRO/SHEATH, NON-LASER: HCPCS | Performed by: INTERNAL MEDICINE

## 2023-01-24 PROCEDURE — C1733 CATH, EP, OTHR THAN COOL-TIP: HCPCS | Performed by: INTERNAL MEDICINE

## 2023-01-24 PROCEDURE — 93656 PR ELECTROPHYS EVAL, COMPREHEN, W/ABLATION OF ATRIAL FIBR: ICD-10-PCS | Mod: ,,, | Performed by: INTERNAL MEDICINE

## 2023-01-24 PROCEDURE — 27201423 OPTIME MED/SURG SUP & DEVICES STERILE SUPPLY: Performed by: INTERNAL MEDICINE

## 2023-01-24 PROCEDURE — 93312 ECHO TRANSESOPHAGEAL: CPT | Mod: 26,,, | Performed by: INTERNAL MEDICINE

## 2023-01-24 PROCEDURE — 63600175 PHARM REV CODE 636 W HCPCS: Performed by: INTERNAL MEDICINE

## 2023-01-24 PROCEDURE — D9220A PRA ANESTHESIA: ICD-10-PCS | Mod: CRNA,,, | Performed by: NURSE ANESTHETIST, CERTIFIED REGISTERED

## 2023-01-24 PROCEDURE — 63600175 PHARM REV CODE 636 W HCPCS: Performed by: NURSE ANESTHETIST, CERTIFIED REGISTERED

## 2023-01-24 PROCEDURE — 00537 ANES CARDIAC EP PROCEDURES: CPT | Performed by: INTERNAL MEDICINE

## 2023-01-24 PROCEDURE — 36620 ARTERIAL: ICD-10-PCS | Mod: 59,,, | Performed by: ANESTHESIOLOGY

## 2023-01-24 PROCEDURE — C1753 CATH, INTRAVAS ULTRASOUND: HCPCS | Performed by: INTERNAL MEDICINE

## 2023-01-24 PROCEDURE — 93312 TRANSESOPHAGEAL ECHO (TEE) (CUPID ONLY): ICD-10-PCS | Mod: 26,,, | Performed by: INTERNAL MEDICINE

## 2023-01-24 PROCEDURE — C1766 INTRO/SHEATH,STRBLE,NON-PEEL: HCPCS | Performed by: INTERNAL MEDICINE

## 2023-01-24 PROCEDURE — C1730 CATH, EP, 19 OR FEW ELECT: HCPCS | Performed by: INTERNAL MEDICINE

## 2023-01-24 PROCEDURE — 93325 TRANSESOPHAGEAL ECHO (TEE) (CUPID ONLY): ICD-10-PCS | Mod: 26,,, | Performed by: INTERNAL MEDICINE

## 2023-01-24 PROCEDURE — 93320 TRANSESOPHAGEAL ECHO (TEE) (CUPID ONLY): ICD-10-PCS | Mod: 26,,, | Performed by: INTERNAL MEDICINE

## 2023-01-24 PROCEDURE — 36620 INSERTION CATHETER ARTERY: CPT | Mod: 59,,, | Performed by: ANESTHESIOLOGY

## 2023-01-24 PROCEDURE — D9220A PRA ANESTHESIA: Mod: CRNA,,, | Performed by: NURSE ANESTHETIST, CERTIFIED REGISTERED

## 2023-01-24 PROCEDURE — 93005 ELECTROCARDIOGRAM TRACING: CPT

## 2023-01-24 PROCEDURE — 93320 DOPPLER ECHO COMPLETE: CPT | Mod: 26,,, | Performed by: INTERNAL MEDICINE

## 2023-01-24 PROCEDURE — D9220A PRA ANESTHESIA: Mod: ANES,,, | Performed by: ANESTHESIOLOGY

## 2023-01-24 PROCEDURE — 37000008 HC ANESTHESIA 1ST 15 MINUTES: Performed by: INTERNAL MEDICINE

## 2023-01-24 PROCEDURE — 99499 UNLISTED E&M SERVICE: CPT | Mod: ,,, | Performed by: INTERNAL MEDICINE

## 2023-01-24 PROCEDURE — D9220A PRA ANESTHESIA: ICD-10-PCS | Mod: ANES,,, | Performed by: ANESTHESIOLOGY

## 2023-01-24 PROCEDURE — C1769 GUIDE WIRE: HCPCS | Performed by: INTERNAL MEDICINE

## 2023-01-24 PROCEDURE — 93010 ELECTROCARDIOGRAM REPORT: CPT | Mod: ,,, | Performed by: INTERNAL MEDICINE

## 2023-01-24 PROCEDURE — 93325 DOPPLER ECHO COLOR FLOW MAPG: CPT | Mod: 26,,, | Performed by: INTERNAL MEDICINE

## 2023-01-24 RX ORDER — PROPOFOL 10 MG/ML
VIAL (ML) INTRAVENOUS
Status: DISCONTINUED | OUTPATIENT
Start: 2023-01-24 | End: 2023-01-24

## 2023-01-24 RX ORDER — HEPARIN SODIUM 1000 [USP'U]/ML
INJECTION, SOLUTION INTRAVENOUS; SUBCUTANEOUS
Status: DISCONTINUED | OUTPATIENT
Start: 2023-01-24 | End: 2023-01-24

## 2023-01-24 RX ORDER — NEOSTIGMINE METHYLSULFATE 0.5 MG/ML
INJECTION, SOLUTION INTRAVENOUS
Status: DISCONTINUED | OUTPATIENT
Start: 2023-01-24 | End: 2023-01-24

## 2023-01-24 RX ORDER — PROTAMINE SULFATE 10 MG/ML
INJECTION, SOLUTION INTRAVENOUS
Status: DISCONTINUED | OUTPATIENT
Start: 2023-01-24 | End: 2023-01-24

## 2023-01-24 RX ORDER — IODIXANOL 320 MG/ML
INJECTION, SOLUTION INTRAVASCULAR
Status: DISCONTINUED | OUTPATIENT
Start: 2023-01-24 | End: 2023-01-25 | Stop reason: HOSPADM

## 2023-01-24 RX ORDER — HEPARIN SODIUM 10000 [USP'U]/100ML
INJECTION, SOLUTION INTRAVENOUS CONTINUOUS PRN
Status: DISCONTINUED | OUTPATIENT
Start: 2023-01-24 | End: 2023-01-24

## 2023-01-24 RX ORDER — PHENYLEPHRINE HYDROCHLORIDE 10 MG/ML
INJECTION INTRAVENOUS
Status: DISCONTINUED | OUTPATIENT
Start: 2023-01-24 | End: 2023-01-24

## 2023-01-24 RX ORDER — LORAZEPAM 0.5 MG/1
0.5 TABLET ORAL EVERY 6 HOURS PRN
Status: DISCONTINUED | OUTPATIENT
Start: 2023-01-24 | End: 2023-01-25 | Stop reason: HOSPADM

## 2023-01-24 RX ORDER — ONDANSETRON 2 MG/ML
INJECTION INTRAMUSCULAR; INTRAVENOUS
Status: DISCONTINUED | OUTPATIENT
Start: 2023-01-24 | End: 2023-01-24

## 2023-01-24 RX ORDER — FENTANYL CITRATE 50 UG/ML
INJECTION, SOLUTION INTRAMUSCULAR; INTRAVENOUS
Status: DISCONTINUED | OUTPATIENT
Start: 2023-01-24 | End: 2023-01-24

## 2023-01-24 RX ORDER — LIDOCAINE HYDROCHLORIDE 10 MG/ML
INJECTION, SOLUTION INTRAVENOUS
Status: DISCONTINUED | OUTPATIENT
Start: 2023-01-24 | End: 2023-01-24

## 2023-01-24 RX ORDER — HYDROMORPHONE HYDROCHLORIDE 1 MG/ML
0.2 INJECTION, SOLUTION INTRAMUSCULAR; INTRAVENOUS; SUBCUTANEOUS EVERY 5 MIN PRN
Status: DISCONTINUED | OUTPATIENT
Start: 2023-01-24 | End: 2023-01-25 | Stop reason: HOSPADM

## 2023-01-24 RX ORDER — PHENYLEPHRINE HYDROCHLORIDE 10 MG/ML
INJECTION INTRAVENOUS CONTINUOUS PRN
Status: DISCONTINUED | OUTPATIENT
Start: 2023-01-24 | End: 2023-01-24

## 2023-01-24 RX ORDER — FLECAINIDE ACETATE 100 MG/1
100 TABLET ORAL EVERY 12 HOURS
Status: DISCONTINUED | OUTPATIENT
Start: 2023-01-25 | End: 2023-01-25 | Stop reason: HOSPADM

## 2023-01-24 RX ORDER — DEXAMETHASONE SODIUM PHOSPHATE 4 MG/ML
INJECTION, SOLUTION INTRA-ARTICULAR; INTRALESIONAL; INTRAMUSCULAR; INTRAVENOUS; SOFT TISSUE
Status: DISCONTINUED | OUTPATIENT
Start: 2023-01-24 | End: 2023-01-24

## 2023-01-24 RX ORDER — LIDOCAINE HYDROCHLORIDE 10 MG/ML
INJECTION INFILTRATION; PERINEURAL
Status: DISCONTINUED | OUTPATIENT
Start: 2023-01-24 | End: 2023-01-25 | Stop reason: HOSPADM

## 2023-01-24 RX ORDER — ROCURONIUM BROMIDE 10 MG/ML
INJECTION, SOLUTION INTRAVENOUS
Status: DISCONTINUED | OUTPATIENT
Start: 2023-01-24 | End: 2023-01-24

## 2023-01-24 RX ORDER — ACETAMINOPHEN 325 MG/1
650 TABLET ORAL EVERY 4 HOURS PRN
Status: DISCONTINUED | OUTPATIENT
Start: 2023-01-24 | End: 2023-01-25 | Stop reason: HOSPADM

## 2023-01-24 RX ORDER — SODIUM CHLORIDE 0.9 % (FLUSH) 0.9 %
10 SYRINGE (ML) INJECTION
Status: DISCONTINUED | OUTPATIENT
Start: 2023-01-24 | End: 2023-01-25 | Stop reason: HOSPADM

## 2023-01-24 RX ORDER — MIDAZOLAM HYDROCHLORIDE 1 MG/ML
INJECTION, SOLUTION INTRAMUSCULAR; INTRAVENOUS
Status: DISCONTINUED | OUTPATIENT
Start: 2023-01-24 | End: 2023-01-24

## 2023-01-24 RX ADMIN — DEXAMETHASONE SODIUM PHOSPHATE 4 MG: 4 INJECTION, SOLUTION INTRAMUSCULAR; INTRAVENOUS at 02:01

## 2023-01-24 RX ADMIN — HEPARIN SODIUM 3000 UNITS: 1000 INJECTION, SOLUTION INTRAVENOUS; SUBCUTANEOUS at 03:01

## 2023-01-24 RX ADMIN — PROTAMINE SULFATE 5 MG: 10 INJECTION, SOLUTION INTRAVENOUS at 04:01

## 2023-01-24 RX ADMIN — MIDAZOLAM HYDROCHLORIDE 2 MG: 1 INJECTION, SOLUTION INTRAMUSCULAR; INTRAVENOUS at 01:01

## 2023-01-24 RX ADMIN — LORAZEPAM 0.5 MG: 0.5 TABLET ORAL at 11:01

## 2023-01-24 RX ADMIN — ONDANSETRON 4 MG: 2 INJECTION INTRAMUSCULAR; INTRAVENOUS at 04:01

## 2023-01-24 RX ADMIN — PROPOFOL 150 MG: 10 INJECTION, EMULSION INTRAVENOUS at 01:01

## 2023-01-24 RX ADMIN — ROCURONIUM BROMIDE 30 MG: 10 INJECTION INTRAVENOUS at 01:01

## 2023-01-24 RX ADMIN — HEPARIN SODIUM 17000 UNITS: 1000 INJECTION, SOLUTION INTRAVENOUS; SUBCUTANEOUS at 02:01

## 2023-01-24 RX ADMIN — PROPOFOL 50 MG: 10 INJECTION, EMULSION INTRAVENOUS at 01:01

## 2023-01-24 RX ADMIN — SODIUM CHLORIDE 0.25 MCG/KG/MIN: 9 INJECTION, SOLUTION INTRAVENOUS at 01:01

## 2023-01-24 RX ADMIN — GLYCOPYRROLATE 0.3 MG: 0.2 INJECTION, SOLUTION INTRAMUSCULAR; INTRAVENOUS at 04:01

## 2023-01-24 RX ADMIN — HEPARIN SODIUM 12 UNITS/KG/HR: 10000 INJECTION, SOLUTION INTRAVENOUS at 02:01

## 2023-01-24 RX ADMIN — PHENYLEPHRINE HYDROCHLORIDE 100 MCG: 10 INJECTION INTRAVENOUS at 01:01

## 2023-01-24 RX ADMIN — LIDOCAINE HYDROCHLORIDE 100 MG: 10 INJECTION, SOLUTION INTRAVENOUS at 01:01

## 2023-01-24 RX ADMIN — NEOSTIGMINE METHYLSULFATE 2 MG: 0.5 INJECTION, SOLUTION INTRAVENOUS at 04:01

## 2023-01-24 RX ADMIN — PROTAMINE SULFATE 80 MG: 10 INJECTION, SOLUTION INTRAVENOUS at 04:01

## 2023-01-24 RX ADMIN — FENTANYL CITRATE 100 MCG: 50 INJECTION, SOLUTION INTRAMUSCULAR; INTRAVENOUS at 01:01

## 2023-01-24 RX ADMIN — SODIUM CHLORIDE: 0.9 INJECTION, SOLUTION INTRAVENOUS at 01:01

## 2023-01-24 RX ADMIN — RIVAROXABAN 20 MG: 20 TABLET, FILM COATED ORAL at 10:01

## 2023-01-24 NOTE — BRIEF OP NOTE
: Carlos Daugherty MD  Date of procedure: 1/24/2023    Post-operative Diagnosis: AF    Procedure Performed: Pulmonary vein isolation of all 4 pulmonary veins via cryoballoon ablation.     Description of Procedure: The patient was brought to the EP lab in the fasting state. Prepped and draped in sterile fashion. Safety timeout was performed. Sedation administered by anesthesia staff. Ultrasound guided venous access of the bilateral femoral veins was performed. ICE, HIS and CS catheters placed via left femoral vein access. Long sheaths via right femoral venous access. Heparin bolus and continuous infusion per protocol. A single transseptal puncture was performed using combination of fluoroscopic and ICE guidance.  Cryoballoon to isolate all pulmonary veins. ICE confirmed no significant pericardial effusion.     EBL: <10 mL    Specimens Removed: None  Complications: no immediate    A/P  Suture removal at 4 hours bed rest for 6 hours  Resume anticoagulation this evening  [facility]

## 2023-01-24 NOTE — ANESTHESIA PROCEDURE NOTES
Intubation    Date/Time: 1/24/2023 1:30 PM  Performed by: Alexus Marinelli CRNA  Authorized by: Jackelyn Posadas MD     Intubation:     Induction:  Intravenous    Intubated:  Postinduction    Mask Ventilation:  Easy with oral airway    Attempts:  1    Attempted By:  CRNA    Method of Intubation:  Direct and video laryngoscopy    Blade:  [place] 3    Laryngeal View Grade: Grade I - full view of cords      Difficult Airway Encountered?: No      Complications:  None    Airway Device:  Oral endotracheal tube    Airway Device Size:  7.5    Style/Cuff Inflation:  Cuffed (inflated to minimal occlusive pressure)    Tube secured:  21    Secured at:  The teeth    Placement Verified By:  Capnometry    Complicating Factors:  None    Findings Post-Intubation:  BS equal bilateral and atraumatic/condition of teeth unchanged

## 2023-01-24 NOTE — Clinical Note
Your Child's Health  Nine-Month-Old Visit      Hydrocortisone 3-4x a day for few days.   Aleksandar Amanda  March 21, 2018    Visit Vitals  Pulse 140   Temp 98.6 °F (37 °C) (Temporal Artery)   Resp 44   Ht 29.25\" (74.3 cm)   Wt 8.788 kg   HC 46.7 cm (18.39\")   BMI 15.92 kg/m²     Weight: 19.37 lbs      NUTRITION:  Phase out the bottle. Children should get off the bottle as soon as possible after reaching one year of age. Bottle use or nursing past one year contributes to higher rates of tooth decay. If you have a hard time stopping the bottle, you can either fill the bottle with water (which will not harm the teeth) or remove the bottle as soon as it is empty. Encourage the use of a cup. Introduce finger foods: Table food can comprise a full diet as long as it is of a type that the baby can manage without choking. For that reason, avoid nuts, popcorn, raw vegetables, and foods like grapes and large hot dog pieces that, if swallowed whole, could block the airway.    Because we have less sunlight in our part of the country, infants whose only source of milk is mother's milk should have nursing baby vitamins (available without prescription at the drug store) each day. Formula fed babies should also take vitamins until they are drinking 32 ounces of formula a day.      We no longer consider juice a health food.  When fruits were not available, it was a nutritional help, but whole fruits are much better nutritionally than juice. Amounts of juice over 4 ounces per day are associated with an increased risk of obesity.    Avoid using food, especially sweets, to keep Aleksandar from crying.     DEVELOPMENT/BEHAVIOR:  Most babies are becoming more mobile at 9 months.  They may roll, scoot, or crawl.  Some will try to pull up to a standing position. Single consonant sounds (such as \"da\" or \"ba\") will lead to repetitive consonants (\"baba\" or \"vic\").  You can encourage language development by pointing to objects, pictures, and  left groin was sutured1[OHS#8594 body parts and saying the name. At this age, babies grab everything and it all goes into the mouth. These movements become smoother and faster, so be careful. Show your approval for desirable actions with smiles and kind words. Begin to use \"no\" and to look away for a few seconds (a beginner's time-out) when undesirable behavior occurs. Most babies at this age can sleep all night, often ten to twelve hours. Most babies take at least one nap a day, but a few do not nap at all.    ACCIDENT PREVENTION:  Kitchen: Hot liquids, hot foods,  and electrical cords must be kept out of reach. Move cleaning products up from under sinks.  Use outlet protectors and hide extension cords.  Stairs: Use salazar or block stairs off with furniture.  Windows: Use locks or guards, especially on upstairs windows. Babies have fallen through screens by leaning on them.  Water Safety: Empty any buckets of water. Children have drowned in buckets, especially the five-gallon construction-material type. Fence off permanent pools and drain wading pools when not in use. Never leave  Aleksandar alone in the tub. Even babies who have had swimming classes are not safe alone in the tub. Keep the toilet lid down.  Medications: If prescribed medication needs to be refrigerated, please store it  at the back of the refrigerator to prevent Aleksandar from reaching it or spilling it.  Poisoning:  Remove the following items from reach or place them in a locked cabinet: cleaning products, kerosene (lamp oil), paint, pesticides, purses (which sometimes contain medicines), plants, medicines including vitamins  and birth control pills  · Use safety caps on medicines. Household  and polishes must be kept out of reach. Store medicines and  out of sight.  Do not transfer medicines to non-childproofed or unlabeled containers. Dispose of unused medications. Be especially careful when visiting older persons or families without children in the house.  They may  be in the habit of keeping their medicines out on tables.  · Syrup of Ipecac is no longer recommended to be kept in the home. Please dispose of any remaining supplies.  · Call the Poison Center at 1-634.853.4498 for any known or suspected poisoning.    CAR SAFETY:  An approved car seat in the back seat of the car is required by [place] law until Aleksandar is 4 years old and weighs at least 40 pounds. All infants and toddlers should ride in a rear-facing car seat until 2 years of age or until they reach the highest weight or height allowed by their car seats  per recommendation of the American Academy of Pediatrics. A rear-facing car seat in the back seat is the safest place for your baby. This is especially true if your car is equipped with passenger-side air bags.      SMOKING:  Children exposed to tobacco smoke have more ear infections and pneumonia. Cold symptoms last longer. If you smoke, please quit. If you cannot quit, smoke outside. Do not smoke near Aleksandar, and do not let others smoke near him.    SHOES: Stephanies feet will develop fine whether they have shoes or not.  He can run barefoot indoors or outdoors, as long as the surface is smooth and not hot.  Shoes provide safety, warmth, protection and, of course, decoration.  Choose shoes that have a roomy fit and flat, flexible soles with nonskid bottoms.    SUN EXPOSURE:  If you plan to have Aleksandar outside for more than 30 minutes, please follow the guidelines in our Sun Exposure handout.    TEETHING:  Teething children may have no symptoms at all, or they may experience drooling, rashes, crabbiness, or changes in diet. Do not assume that a fever is due to teething. Temperatures over 101 degrees are usually from some other cause. Once the teeth erupt, you should begin cleaning Stephanies teeth with a washcloth, gauze, or soft toothbrush. Toothpaste is not necessary yet.    LEAD EXPOSURE:  Aleksandar will be more mobile in the next few months. If  there is lead in the house, he may be vulnerable. Let us know if any of the following apply:  · Your home was built before 1960 and has peeling or chipping or chalking paint. Homes built in older cities at the turn of the last century may have lead pipes. Check to see if any of the above applies to Aleksandar's sitter's home, , , or any other house where he spends time.  · You have another child or housemate who is being followed or treated for an elevated lead level.  · Aleksandar lives with an adult whose job or hobby involves lead exposure  (soldering, battery manufacture, recycling, casting, stained glass, etc.).  · You live near an active lead smelter, a battery recycling plant, or a plant that processes hot metal.    TUBERCULOSIS:  There is such a low rate of tuberculosis in our area that frequent skin tests are no longer recommended. However, certain situations do increase Aleksandar's risk, including contact with AIDS patients, nursing home residents, prisoners, immigrants, or homeless persons. Please let us know if Alkesandar has contacts with such persons, or if he has contact with anyone known to have or suspected of having tuberculosis.              MEDICATION FOR FEVER OR PAIN:   Acetaminophen liquid (e.g., Tylenol or Tempra) may be given every four hours as needed for pain or fever.  Be sure to check which product CONCENTRATION you are using.    INFANT/CHILDREN’S Tylenol/Acetaminophen  (160 MG/5 mL)  Child’s Weight: Dose:  12 - 17 pounds:   80 mg (2.5 mL  (1/2 Teaspoon))  18 - 23 pounds:   120 mg (3.75 mL (3/4 Teaspoon))    INFANT Ibuprofen (50 mg/1.25 ml)  liquid (for example Advil or Motrin) may be given every 6 hours as needed for pain or fever.    Child’s Weight: Dose:  13 - 17 pounds:   60 - 80 mg (1.5 - 2.0 mL ( 1/3 - 2/5 Teaspoon))  18 - 23 pounds:   80 - 100 mg (2.0 - 2.5 mL (2/5 - 1/2 Teaspoon))      CHILDREN'S Ibuprofen (100 mg/5 mL) liquid (for example Advil or Motrin) may be given every  6 hours as needed for pain or fever.    Child’s Weight: Dose:  13 - 17 pounds:   60 - 80 mg (3.0 - 4.0 mL (3/5 - 4/5 Teaspoon))  18 - 23 pounds:   80 - 100 mg (4.0 - 5.0 mL (4/5 - 1 Teaspoon))         Most Recent Immunizations   Administered Date(s) Administered   • DTaP/Hep B/IPV 2017   • HIB, Unspecified Formulation 2017   • Hep B, adolescent or pediatric 2017   • Influenza, injectable, quadrivalent, preservative free, pediatric 01/17/2018   • Pneumococcal Conjugate 13 valent 2017   • Rotavirus - monovalent 2017       If Aleksandar develops any of the following reactions within 72 hours after an immunization, notify your pediatrician by calling the pediatric phone nurse:  · A temperature of 105 degrees or above.  · More than 3 hours of continuous crying.  · A shrill, high-pitched cry.  · A pale, limp spell.  · A seizure or fainting spell. In this case, you should call 911 or go immediately to the emergency room.    NEXT VISIT: ONE YEAR OF AGE    NOTE:  Aleksandar should have the 1-year-old exam after his first birthday.  The immunizations given at that visit must take place after Aleksandar’s birthday in order to meet [facility] requirements.    Thank you for entrusting your care to [facility].

## 2023-01-24 NOTE — TRANSFER OF CARE
"Anesthesia Transfer of Care Note    Patient: Wilbur Diop JrHiren    Procedure(s) Performed: Procedure(s) (LRB):  Ablation, Atrial Fibrillation, Cryo (N/A)  ECHOCARDIOGRAM, TRANSESOPHAGEAL (N/A)    Patient location: PACU    Anesthesia Type: general    Transport from OR: Transported from OR on 6-10 L/min O2 by face mask with adequate spontaneous ventilation    Post pain: adequate analgesia    Post assessment: no apparent anesthetic complications and tolerated procedure well    Post vital signs: stable    Level of consciousness: awake and alert    Nausea/Vomiting: no nausea/vomiting    Complications: none    Transfer of care protocol was followed      Last vitals:   Visit Vitals  /78 (BP Location: Right arm, Patient Position: Lying)   Pulse 66   Temp 36.8 °C (98.2 °F) (Temporal)   Resp 20   Ht 5' 9" (1.753 m)   Wt 108.4 kg (239 lb)   SpO2 95%   BMI 35.29 kg/m²     "

## 2023-01-24 NOTE — ANESTHESIA PREPROCEDURE EVALUATION
01/24/2023  Wilbur Diop Jr. is a 42 y.o., male with paroxysmal afib here for afib ablation.  Had prior flutter ablation in 3/2022    Past Medical History:   Diagnosis Date    ADHD (attention deficit hyperactivity disorder)     Allergy     Anxiety     Atrial fibrillation     Atrial flutter     Depression     History of psychiatric hospitalization     2002 for depression    Hx of psychiatric care     Psychiatric problem     Supraventricular tachycardia     Therapy      Lab Results   Component Value Date    WBC 10.46 01/17/2023    HGB 15.0 01/17/2023    HCT 46.3 01/17/2023    MCV 89 01/17/2023     01/17/2023       Results for orders placed in visit on 02/16/22    Echo    Interpretation Summary  · The left ventricle is normal in size with normal systolic function. The estimated ejection fraction is 60%.  · Normal right ventricular size with normal right ventricular systolic function.  · Normal left ventricular diastolic function.  · Normal central venous pressure (3 mmHg).        Pre-op Assessment    I have reviewed the Patient Summary Reports.     I have reviewed the Nursing Notes. I have reviewed the NPO Status.      Review of Systems  Anesthesia Hx:  No problems with previous Anesthesia    Cardiovascular:   Dysrhythmias    Pulmonary:  Pulmonary Normal    Hepatic/GI:  Hepatic/GI Normal        Physical Exam  General: Well nourished    Airway:  Mallampati: II   Mouth Opening: Small, but > 3cm  Tongue: Normal    Dental:  Intact, Periodontal disease  Small mouth opening      Anesthesia Plan  Type of Anesthesia, risks & benefits discussed:    Anesthesia Type: Gen ETT  Intra-op Monitoring Plan: Standard ASA Monitors and Art Line  Post Op Pain Control Plan: multimodal analgesia  Induction:  IV  Airway Plan: Direct, Post-Induction  Informed Consent: Informed consent signed with the Patient and  all parties understand the risks and agree with anesthesia plan.  All questions answered. Patient consented to blood products? Yes  ASA Score: 2    Ready For Surgery From Anesthesia Perspective.     .

## 2023-01-24 NOTE — ANESTHESIA PROCEDURE NOTES
Arterial    Diagnosis: afib    Patient location during procedure: done in OR    Staffing  Authorizing Provider: Jackelyn Posadas MD  Performing Provider: Jackelyn Posadas MD    Anesthesiologist was present at the time of the procedure.    Preanesthetic Checklist  Completed: patient identified, IV checked, site marked, risks and benefits discussed, surgical consent, monitors and equipment checked, pre-op evaluation, timeout performed and anesthesia consent givenArterial  Skin Prep: chlorhexidine gluconate  Local Infiltration: none  Orientation: right  Location: radial    Catheter Size: 20 G  Catheter placement by Ultrasound guidance. Heme positive aspiration all ports.   Vessel Caliber: patent  Needle advanced into vessel with real time Ultrasound guidance.Insertion Attempts: 2  Assessment  Dressing: secured with tape and tegaderm

## 2023-01-24 NOTE — CONSULTS
[facility]  RAFAEL  Consult Note       HPI: Wilbur Diop Jr. is a 42 y.o. male with past medical history of:  -Atrial flutter s/p prior ablation  -Anxiety    Who presents for ablation with Dr. Daugherty. Patient reports no new symptoms today. Taking all medications as prescribed. No contraindications to RAFAEL.     TTE 3/7/22:  The left ventricle is normal in size with normal systolic function. The estimated ejection fraction is 60%.  Normal right ventricular size with normal right ventricular systolic function.  Normal left ventricular diastolic function.  Normal central venous pressure (3 mmHg).      ROS: Patient denies angina, SANTA, lower extremity edema, orthopnea, PND, palpitations, presyncope or syncope. All other ROS negative except as stated above.    PMHx:  As above    PSHx:   As above    Family Hx:  Family History   Problem Relation Age of Onset    Depression Mother     Anxiety disorder Mother     No Known Problems Father     No Known Problems Sister     No Known Problems Brother     No Known Problems Maternal Aunt     No Known Problems Maternal Uncle     No Known Problems Paternal Aunt     No Known Problems Paternal Uncle     No Known Problems Maternal Grandmother     Cancer Maternal Grandfather     No Known Problems Paternal Grandmother     No Known Problems Paternal Grandfather     Amblyopia Neg Hx     Blindness Neg Hx     Cataracts Neg Hx     Diabetes Neg Hx     Glaucoma Neg Hx     Hypertension Neg Hx     Macular degeneration Neg Hx     Retinal detachment Neg Hx     Strabismus Neg Hx     Stroke Neg Hx     Thyroid disease Neg Hx        Social Hx:   Social History     Tobacco Use    Smoking status: Former     Types: Cigarettes     Quit date: 10/22/2004     Years since quittin.2    Smokeless tobacco: Never   Substance Use Topics    Alcohol use: Yes     Alcohol/week: 1.7 standard drinks     Types: 2 Standard drinks or equivalent per week     Comment: once a month; socially        Allergies:  Review of patient's allergies indicates:  No Known Allergies    Home Meds:  Current Outpatient Medications   Medication Instructions    cetirizine (ZYRTEC) 5 mg, Oral, Daily    clonazepam (KLONOPIN ORAL) Oral, Daily PRN    ergocalciferol, vitamin D2, (VITAMIN D ORAL) Oral    flecainide (TAMBOCOR) 100 mg, Oral, Every 12 hours    MAGNESIUM ORAL Oral    multivitamin capsule 1 capsule, Oral, Daily    rivaroxaban (XARELTO) 20 mg, Oral, With dinner    serdexmethylphen-dexmethylphen 26.1 mg- 5.2 mg Cap 1 capsule, Oral, Daily    venlafaxine (EFFEXOR-XR) 75 mg, Oral, Daily    verapamiL (CALAN-SR) 120 mg, Oral, Nightly    zinc 50 mg Tab Oral       Vitals:  Temp:  [98.2 °F (36.8 °C)] 98.2 °F (36.8 °C)  Pulse:  [66] 66  Resp:  [20] 20  SpO2:  [95 %] 95 %  BP: (119-123)/(78-82) 119/78    Physical Exam  Gen: No apparent distress, resting comfortably  HEENT: Pupils equal and reactive to light  Cardio: Regular rate, point of maximal impulse not displaced, no murmur noted, 2+ radial pulses bilaterally, 2+ DP pulses bilaterally  Resp: CTAB, no wheezing  Abd: Soft, non-tender, non-distended  Skin: Warm, dry, no peripheral edema noted  Neuro: Alert and oriented x3  Psych: Normal mood and affect    ASA: 2  Mallampati: 3    Labs:  No results for input(s): WBC, HGB, HCT, PLT in the last 168 hours.  No results for input(s): NA, K, CL, CO2, BUN, CREATININE, GLU, CALCIUM, MG, PHOS, LIPASE, AMYLASE in the last 168 hours.      Current Meds:      Assessment and Plan:    1. RAFAEL for evaluation of GLORIA prior to ablation  -No absolute contraindications of esophageal stricture, tumor, perforation, laceration,or diverticulum and/or active GI bleed  -The risks, benefits & alternatives of the procedure were explained to the patient.   -The risks of transesophageal echo include but are not limited to:  Dental trauma, esophageal trauma/perforation, bleeding, laryngospasm/brochospasm, aspiration, sore throat/hoarseness, & dislodgement of  the endotracheal tube/nasogastric tube (where applicable).    -The risks of moderate sedation include hypotension, respiratory depression, arrhythmias, bronchospasm, & death.    -Informed consent was obtained. The patient is agreeable to proceed with the procedure and all questions and concerns addressed.    Case discussed with an attending in echocardiography lab.     Further recommendations per attending addendum        Tommie Palafox MD  [facility] PGY-5    Staff attestation to follow.    This note was prepared using voice recognition system and may have sound alike errors that may have been overlooked even with proof reading.

## 2023-01-25 VITALS
SYSTOLIC BLOOD PRESSURE: 109 MMHG | RESPIRATION RATE: 18 BRPM | HEART RATE: 78 BPM | DIASTOLIC BLOOD PRESSURE: 61 MMHG | OXYGEN SATURATION: 96 % | WEIGHT: 194 LBS | HEIGHT: 69 IN | TEMPERATURE: 98 F | BODY MASS INDEX: 28.73 KG/M2

## 2023-01-25 PROCEDURE — 25000003 PHARM REV CODE 250: Performed by: INTERNAL MEDICINE

## 2023-01-25 RX ORDER — PANTOPRAZOLE SODIUM 40 MG/1
40 TABLET, DELAYED RELEASE ORAL DAILY
Qty: 30 TABLET | Refills: 0 | Status: SHIPPED | OUTPATIENT
Start: 2023-01-25 | End: 2023-01-25 | Stop reason: SDUPTHER

## 2023-01-25 RX ORDER — PANTOPRAZOLE SODIUM 40 MG/1
40 TABLET, DELAYED RELEASE ORAL DAILY
Status: DISCONTINUED | OUTPATIENT
Start: 2023-01-25 | End: 2023-01-25 | Stop reason: HOSPADM

## 2023-01-25 RX ORDER — PANTOPRAZOLE SODIUM 40 MG/1
40 TABLET, DELAYED RELEASE ORAL DAILY
Qty: 30 TABLET | Refills: 0 | Status: SHIPPED | OUTPATIENT
Start: 2023-01-25 | End: 2023-05-03

## 2023-01-25 RX ADMIN — PANTOPRAZOLE SODIUM 40 MG: 40 TABLET, DELAYED RELEASE ORAL at 09:01

## 2023-01-25 RX ADMIN — FLECAINIDE ACETATE 100 MG: 100 TABLET ORAL at 09:01

## 2023-01-25 NOTE — DISCHARGE INSTRUCTIONS
Do not strain or lift anything greater than 5 lb for 1 week.  Do not drive or operate any dangerous machinery for 24 hours.   Clean the area with mild soap and water and then cover it with a bandage.   Once the skin has healed, bathing in a tub or swimming is allowed.   Inspect the groin site daily and report to the physician any swelling at the site that   cannot be controlled with manual pressure for 10 minutes, unusual pain at the   access site or affected extremity, unusual swelling at the access site, or signs or   symptoms of infection such as redness, pain, or fever.   Call 911 if you have:   Bleeding from the puncture site that you cannot stop by doing the following:   Relax and lie down right away. Keep your leg flat and apply firm pressure to the site using your fingers and a gauze pad. Keep the pressure on for 20 minutes. Continue this until the bleeding stops. This may take awhile. When bleeding stops, cover the site with a sterile bandage and keep your leg still as much as possible.

## 2023-01-25 NOTE — NURSING
Patient is ready for discharge. Patient stable alert and oriented. Telemetry and IVs removed. No complaints of pain. Discussed discharge plan. Reviewed medications and side effects, appointments, and answered questions with patient and wife. Medications sent to home [facility] pharmacy. Patient walked out with wife.

## 2023-01-25 NOTE — PLAN OF CARE
Problem: Adult Inpatient Plan of Care  Goal: Absence of Hospital-Acquired Illness or Injury  Outcome: Ongoing, Progressing  Goal: Optimal Comfort and Wellbeing  Outcome: Ongoing, Progressing     Problem: Fall Injury Risk  Goal: Absence of Fall and Fall-Related Injury  Outcome: Ongoing, Progressing

## 2023-01-25 NOTE — ANESTHESIA POSTPROCEDURE EVALUATION
Anesthesia Post Evaluation    Patient: Wilbur Diop     Procedure(s) Performed: Procedure(s) (LRB):  Ablation, Atrial Fibrillation, Cryo (N/A)  ECHOCARDIOGRAM, TRANSESOPHAGEAL (N/A)    Final Anesthesia Type: general      Patient location during evaluation: PACU  Patient participation: Yes- Able to Participate  Level of consciousness: awake and alert and oriented  Post-procedure vital signs: reviewed and stable  Pain management: adequate  Airway patency: patent    PONV status at discharge: No PONV  Anesthetic complications: no      Cardiovascular status: blood pressure returned to baseline and hemodynamically stable  Respiratory status: unassisted and spontaneous ventilation  Hydration status: euvolemic  Follow-up not needed.          Vitals Value Taken Time   /61 01/25/23 0746   Temp 36.7 °C (98 °F) 01/25/23 0746   Pulse 78 01/25/23 0746   Resp 18 01/25/23 0746   SpO2 96 % 01/25/23 0746         No case tracking events are documented in the log.      Pain/Austin Score: Pain Rating Prior to Med Admin: 0 (1/24/2023  5:45 PM)  Austin Score: 9 (1/24/2023  6:00 PM)

## 2023-01-25 NOTE — DISCHARGE SUMMARY
[facility] - [facility] Stepdown  Cardiology  Discharge Summary      Patient Name: Wilbur Diop Jr.  MRN: [id number]  Admission Date: 1/24/2023  Hospital Length of Stay: 0 days  Discharge Date and Time:  01/25/2023 7:49 AM  Attending Physician: Carlos Daugherty MD  Discharging Provider: Quique Jauregui MD  Primary Care Physician: Ge Naranjo MD    HPI: 42 y.o. M  anxiety, depression; on meds  AFL, s/p RF CTI  PAF, on flecainide     Had racing heart rate for >2 days. Went to ER 2/11/22; diagnosed with SVT but ECG appears c/w typical AFL with 2:1 conduction. Reportedly CSM had no effect, and arrhythmia terminated following 12 mg adenosine. No ECG of termination.  Felt well since.     Went to ER recently. Again c/w typical AFL. Rx or AFL with RVR was diltiazem; repeat ECG showed NSR.     Update 10/2022:  RF of AFL 3/22. During that case, AFL degenerated to AF at times. SVT was not inducible with up to DAES.  Subsequently, had PAF requiring hospital visit and then monitoring showed 4% AF. Started flecainide.  Since flecainide, doing well.     My interpretation of recent ECG is NSR     Interval History:  Patient denies chest pain fevers chills SOB> Last dose anticoagulation last night    Procedure(s) (LRB):  Ablation, Atrial Fibrillation, Cryo (N/A)  ECHOCARDIOGRAM, TRANSESOPHAGEAL (N/A)     Indwelling Lines/Drains at time of discharge:  Lines/Drains/Airways       None                   Hospital Course (synopsis of major diagnoses, care, treatment, and services provided during the course of the hospital stay): Patient is a 43 yo M presented for PVI. CTI with bidirectional block, tolerated cryo PVI. Patient monitored overnight post op without issues and discharged.       Pending Diagnostic Studies:       Procedure Component Value Units Date/Time    Transesophageal echo (RAFAEL) [[id number]]     Order Status: Sent Lab Status: No result             Final Active Diagnoses:    Diagnosis Date Noted POA    PRINCIPAL PROBLEM:   Paroxysmal atrial fibrillation [I48.0] 10/21/2022 Yes      Problems Resolved During this Admission:       Discharged Condition: good    Follow Up:   Follow-up Information       Carlos Daugherty MD Follow up in 3 month(s).    Specialties: Electrophysiology, Cardiology  Contact information:  [address]  [facility] [address]  [phone]                           Patient Instructions:   Do not strain or lift anything greater than 5 lb for 1 week.  Do not drive or operate any dangerous machinery for 24 hours.   Clean the area with mild soap and water and then cover it with a bandage.   Once the skin has healed, bathing in a tub or swimming is allowed.   Inspect the groin site daily and report to the physician any swelling at the site that   cannot be controlled with manual pressure for 10 minutes, unusual pain at the   access site or affected extremity, unusual swelling at the access site, or signs or   symptoms of infection such as redness, pain, or fever.   Call 911 if you have:   Bleeding from the puncture site that you cannot stop by doing the following:   Relax and lie down right away. Keep your leg flat and apply firm pressure to the site using your fingers and a gauze pad. Keep the pressure on for 20 minutes. Continue this until the bleeding stops. This may take awhile. When bleeding stops, cover the site with a sterile bandage and keep your leg still as much as possible.   Medications:  Reconciled Home Medications:      Medication List        START taking these medications      pantoprazole 40 MG tablet  Commonly known as: PROTONIX  Take 1 tablet (40 mg total) by mouth once daily.            CONTINUE taking these medications      cetirizine 5 MG tablet  Commonly known as: ZYRTEC  Take 5 mg by mouth once daily.     flecainide 100 MG Tab  Commonly known as: TAMBOCOR  Take 1 tablet (100 mg total) by mouth every 12 (twelve) hours.     KLONOPIN ORAL  Take by mouth daily as needed.     MAGNESIUM ORAL  Take by  mouth.     multivitamin capsule  Take 1 capsule by mouth once daily.     rivaroxaban 20 mg Tab  Commonly known as: XARELTO  Take 1 tablet (20 mg total) by mouth daily with dinner or evening meal.     serdexmethylphen-dexmethylphen 26.1 mg- 5.2 mg Cap  Take 1 capsule by mouth once daily.     venlafaxine 75 MG 24 hr capsule  Commonly known as: EFFEXOR-XR  Take 75 mg by mouth once daily.     verapamiL 120 MG CR tablet  Commonly known as: CALAN-SR  Take 1 tablet (120 mg total) by mouth every evening.     VITAMIN D ORAL  Take by mouth.     zinc 50 mg Tab  Take by mouth.              Time spent on the discharge of patient: 30 minutes    Quique Jauregui MD  Cardiology  [facility] - Cardiology Stepdown

## 2023-01-25 NOTE — PLAN OF CARE
"Vss. Sats 97% on 2l nc. Pt denies sob or pain.  Pt s/p ablation cryo a fib.  Arrived to ep pacu 2 with eleuterio groin sutures tiffanie, well approx. X1 each groins. Sutures placed at 1615, removal time 2015, bedrest done at 2215.  No hematoma or drainage noted. Palpable pulses ble. Urinal at bedside with pt. Due to void.  12 lead ekg done and in chart. Pt's wife updated over phone and given new room number u [id number]. Pt's wife has belongings with her. Pt tolerating water.  Pt complaints of "sore throat". Chloraseptic lozenge given to pt in ep pacu. See flowsheet for full assessment. Report called to con csu rn.  Report given to nicole Pedro pacu rn. Pt awaiting transport to csu room 331. Remains in ep pacu 2.   "

## 2023-01-25 NOTE — PROGRESS NOTES
Orthostatic BP    Rn removed pt's groin sutures per protocol. When raising HOB to 30 deg... patient stated they felt lightheaded. RN Obtained VS. Pt's SP was 90s/50s. RN lowered head of bed and rechecked BP. Pt's BP improved to 110s/60s. 2 hours later when bedrest was up RN sat patient at edge of bed with legs dangling. Pt endorsed feeling lightheaded again. RN checked BP and it was 100s/60s. RN waited for lightheadedness to pass before standing patient up. After standing RN checked BP again and it was the roughly the same as when they were sitting 100s/60s. Patient walked in hallway with RN and wife as SBA. No issues and patient tolerated well. RN contacted on call MD who asked RN to encourage PO fluids and to hold off on more walking until drinking more. RN gave patient a pitcher of water and instructed pt on increasing their PO fluids. Will continue to monitor.

## 2023-01-26 ENCOUNTER — PATIENT MESSAGE (OUTPATIENT)
Dept: ENDOCRINOLOGY | Facility: CLINIC | Age: 43
End: 2023-01-26
Payer: MEDICAID

## 2023-01-27 LAB
POC ACTIVATED CLOTTING TIME K: 131 SEC (ref 74–137)
POC ACTIVATED CLOTTING TIME K: 143 SEC (ref 74–137)
POC ACTIVATED CLOTTING TIME K: 335 SEC (ref 74–137)
POC ACTIVATED CLOTTING TIME K: 353 SEC (ref 74–137)
POC ACTIVATED CLOTTING TIME K: 371 SEC (ref 74–137)
SAMPLE: ABNORMAL
SAMPLE: NORMAL

## 2023-02-07 ENCOUNTER — LAB VISIT (OUTPATIENT)
Dept: LAB | Facility: HOSPITAL | Age: 43
End: 2023-02-07
Payer: MEDICAID

## 2023-02-07 DIAGNOSIS — E29.1 HYPOGONADISM IN MALE: ICD-10-CM

## 2023-02-07 LAB
BASOPHILS # BLD AUTO: 0.04 K/UL (ref 0–0.2)
BASOPHILS NFR BLD: 0.5 % (ref 0–1.9)
DIFFERENTIAL METHOD: NORMAL
EOSINOPHIL # BLD AUTO: 0.1 K/UL (ref 0–0.5)
EOSINOPHIL NFR BLD: 1.7 % (ref 0–8)
ERYTHROCYTE [DISTWIDTH] IN BLOOD BY AUTOMATED COUNT: 12.5 % (ref 11.5–14.5)
FSH SERPL-ACNC: 3.43 MIU/ML (ref 0.95–11.95)
HCT VFR BLD AUTO: 44.1 % (ref 40–54)
HGB BLD-MCNC: 14.7 G/DL (ref 14–18)
IMM GRANULOCYTES # BLD AUTO: 0.04 K/UL (ref 0–0.04)
IMM GRANULOCYTES NFR BLD AUTO: 0.5 % (ref 0–0.5)
LH SERPL-ACNC: 2.8 MIU/ML (ref 0.6–12.1)
LYMPHOCYTES # BLD AUTO: 2.5 K/UL (ref 1–4.8)
LYMPHOCYTES NFR BLD: 30.2 % (ref 18–48)
MCH RBC QN AUTO: 29.3 PG (ref 27–31)
MCHC RBC AUTO-ENTMCNC: 33.3 G/DL (ref 32–36)
MCV RBC AUTO: 88 FL (ref 82–98)
MONOCYTES # BLD AUTO: 0.8 K/UL (ref 0.3–1)
MONOCYTES NFR BLD: 9.6 % (ref 4–15)
NEUTROPHILS # BLD AUTO: 4.7 K/UL (ref 1.8–7.7)
NEUTROPHILS NFR BLD: 57.5 % (ref 38–73)
NRBC BLD-RTO: 0 /100 WBC
PLATELET # BLD AUTO: 337 K/UL (ref 150–450)
PMV BLD AUTO: 10.3 FL (ref 9.2–12.9)
PROLACTIN SERPL IA-MCNC: 14.1 NG/ML (ref 3.5–19.4)
RBC # BLD AUTO: 5.01 M/UL (ref 4.6–6.2)
WBC # BLD AUTO: 8.22 K/UL (ref 3.9–12.7)

## 2023-02-07 PROCEDURE — 36415 COLL VENOUS BLD VENIPUNCTURE: CPT | Mod: PO | Performed by: NURSE PRACTITIONER

## 2023-02-07 PROCEDURE — 85025 COMPLETE CBC W/AUTO DIFF WBC: CPT | Performed by: NURSE PRACTITIONER

## 2023-02-07 PROCEDURE — 84403 ASSAY OF TOTAL TESTOSTERONE: CPT | Performed by: NURSE PRACTITIONER

## 2023-02-07 PROCEDURE — 83002 ASSAY OF GONADOTROPIN (LH): CPT | Performed by: NURSE PRACTITIONER

## 2023-02-07 PROCEDURE — 83001 ASSAY OF GONADOTROPIN (FSH): CPT | Performed by: NURSE PRACTITIONER

## 2023-02-07 PROCEDURE — 84146 ASSAY OF PROLACTIN: CPT | Performed by: NURSE PRACTITIONER

## 2023-02-07 PROCEDURE — 84270 ASSAY OF SEX HORMONE GLOBUL: CPT | Performed by: NURSE PRACTITIONER

## 2023-02-13 ENCOUNTER — TELEPHONE (OUTPATIENT)
Dept: UROLOGY | Facility: CLINIC | Age: 43
End: 2023-02-13
Payer: MEDICAID

## 2023-02-13 NOTE — TELEPHONE ENCOUNTER
Called pt to get him rescheduled with Sharron on Friday 2/17. Sharron is booked out in the afternoon. Pt did not answer, left a detailed message.

## 2023-02-17 LAB
ALBUMIN SERPL-MCNC: 4.4 G/DL (ref 3.6–5.1)
SHBG SERPL-SCNC: 16 NMOL/L (ref 10–50)
TESTOST FREE SERPL-MCNC: 41.8 PG/ML (ref 46–224)
TESTOST SERPL-MCNC: 201 NG/DL (ref 250–1100)
TESTOSTERONE.FREE+WB SERPL-MCNC: 84.2 NG/DL (ref 110–575)

## 2023-02-18 ENCOUNTER — PATIENT MESSAGE (OUTPATIENT)
Dept: ADMINISTRATIVE | Facility: HOSPITAL | Age: 43
End: 2023-02-18
Payer: MEDICAID

## 2023-02-18 DIAGNOSIS — Z12.11 SCREENING FOR COLON CANCER: ICD-10-CM

## 2023-03-09 ENCOUNTER — OFFICE VISIT (OUTPATIENT)
Dept: UROLOGY | Facility: CLINIC | Age: 43
End: 2023-03-09
Payer: MEDICAID

## 2023-03-09 VITALS
HEIGHT: 69 IN | WEIGHT: 232.25 LBS | HEART RATE: 92 BPM | BODY MASS INDEX: 34.4 KG/M2 | SYSTOLIC BLOOD PRESSURE: 136 MMHG | DIASTOLIC BLOOD PRESSURE: 92 MMHG

## 2023-03-09 DIAGNOSIS — E29.1 HYPOGONADISM IN MALE: ICD-10-CM

## 2023-03-09 PROCEDURE — 3075F SYST BP GE 130 - 139MM HG: CPT | Mod: CPTII,S$GLB,, | Performed by: NURSE PRACTITIONER

## 2023-03-09 PROCEDURE — 99215 PR OFFICE/OUTPT VISIT, EST, LEVL V, 40-54 MIN: ICD-10-PCS | Mod: S$GLB,,, | Performed by: NURSE PRACTITIONER

## 2023-03-09 PROCEDURE — 3008F BODY MASS INDEX DOCD: CPT | Mod: CPTII,S$GLB,, | Performed by: NURSE PRACTITIONER

## 2023-03-09 PROCEDURE — 1160F PR REVIEW ALL MEDS BY PRESCRIBER/CLIN PHARMACIST DOCUMENTED: ICD-10-PCS | Mod: CPTII,S$GLB,, | Performed by: NURSE PRACTITIONER

## 2023-03-09 PROCEDURE — 3075F PR MOST RECENT SYSTOLIC BLOOD PRESS GE 130-139MM HG: ICD-10-PCS | Mod: CPTII,S$GLB,, | Performed by: NURSE PRACTITIONER

## 2023-03-09 PROCEDURE — 1160F RVW MEDS BY RX/DR IN RCRD: CPT | Mod: CPTII,S$GLB,, | Performed by: NURSE PRACTITIONER

## 2023-03-09 PROCEDURE — 3008F PR BODY MASS INDEX (BMI) DOCUMENTED: ICD-10-PCS | Mod: CPTII,S$GLB,, | Performed by: NURSE PRACTITIONER

## 2023-03-09 PROCEDURE — 3080F DIAST BP >= 90 MM HG: CPT | Mod: CPTII,S$GLB,, | Performed by: NURSE PRACTITIONER

## 2023-03-09 PROCEDURE — 1159F PR MEDICATION LIST DOCUMENTED IN MEDICAL RECORD: ICD-10-PCS | Mod: CPTII,S$GLB,, | Performed by: NURSE PRACTITIONER

## 2023-03-09 PROCEDURE — 3080F PR MOST RECENT DIASTOLIC BLOOD PRESSURE >= 90 MM HG: ICD-10-PCS | Mod: CPTII,S$GLB,, | Performed by: NURSE PRACTITIONER

## 2023-03-09 PROCEDURE — 99215 OFFICE O/P EST HI 40 MIN: CPT | Mod: S$GLB,,, | Performed by: NURSE PRACTITIONER

## 2023-03-09 PROCEDURE — 1159F MED LIST DOCD IN RCRD: CPT | Mod: CPTII,S$GLB,, | Performed by: NURSE PRACTITIONER

## 2023-03-09 RX ORDER — LORAZEPAM 0.5 MG/1
0.5 TABLET ORAL 2 TIMES DAILY PRN
COMMUNITY
Start: 2023-02-16 | End: 2023-12-04

## 2023-03-09 RX ORDER — DEXMETHYLPHENIDATE HYDROCHLORIDE 5 MG/1
5 CAPSULE, EXTENDED RELEASE ORAL EVERY MORNING
Status: ON HOLD | COMMUNITY
Start: 2023-03-07 | End: 2023-07-05

## 2023-03-09 RX ORDER — BREXPIPRAZOLE 1 MG/1
1 TABLET ORAL DAILY
COMMUNITY
End: 2023-04-03

## 2023-03-09 RX ORDER — TESTOSTERONE CYPIONATE 200 MG/ML
200 INJECTION, SOLUTION INTRAMUSCULAR
Status: SHIPPED | OUTPATIENT
Start: 2023-03-09 | End: 2023-04-20

## 2023-03-14 ENCOUNTER — PATIENT MESSAGE (OUTPATIENT)
Dept: ELECTROPHYSIOLOGY | Facility: CLINIC | Age: 43
End: 2023-03-14
Payer: MEDICAID

## 2023-03-15 ENCOUNTER — PATIENT MESSAGE (OUTPATIENT)
Dept: ELECTROPHYSIOLOGY | Facility: CLINIC | Age: 43
End: 2023-03-15
Payer: MEDICAID

## 2023-03-15 DIAGNOSIS — I48.0 PAROXYSMAL ATRIAL FIBRILLATION: Primary | ICD-10-CM

## 2023-03-22 ENCOUNTER — OFFICE VISIT (OUTPATIENT)
Dept: UROLOGY | Facility: CLINIC | Age: 43
End: 2023-03-22
Payer: MEDICAID

## 2023-03-22 DIAGNOSIS — E29.1 HYPOGONADISM IN MALE: Primary | ICD-10-CM

## 2023-03-22 PROCEDURE — 1159F MED LIST DOCD IN RCRD: CPT | Mod: CPTII,S$GLB,, | Performed by: NURSE PRACTITIONER

## 2023-03-22 PROCEDURE — 1159F PR MEDICATION LIST DOCUMENTED IN MEDICAL RECORD: ICD-10-PCS | Mod: CPTII,S$GLB,, | Performed by: NURSE PRACTITIONER

## 2023-03-22 PROCEDURE — 1160F PR REVIEW ALL MEDS BY PRESCRIBER/CLIN PHARMACIST DOCUMENTED: ICD-10-PCS | Mod: CPTII,S$GLB,, | Performed by: NURSE PRACTITIONER

## 2023-03-22 PROCEDURE — 99213 OFFICE O/P EST LOW 20 MIN: CPT | Mod: S$GLB,,, | Performed by: NURSE PRACTITIONER

## 2023-03-22 PROCEDURE — 1160F RVW MEDS BY RX/DR IN RCRD: CPT | Mod: CPTII,S$GLB,, | Performed by: NURSE PRACTITIONER

## 2023-03-22 PROCEDURE — 99213 PR OFFICE/OUTPT VISIT, EST, LEVL III, 20-29 MIN: ICD-10-PCS | Mod: S$GLB,,, | Performed by: NURSE PRACTITIONER

## 2023-03-22 RX ORDER — TESTOSTERONE CYPIONATE 200 MG/ML
200 INJECTION, SOLUTION INTRAMUSCULAR
Qty: 13 ML | Refills: 0 | Status: ON HOLD | OUTPATIENT
Start: 2023-03-22 | End: 2023-07-06 | Stop reason: HOSPADM

## 2023-03-22 RX ORDER — SYRINGE, DISPOSABLE, 3 ML
SYRINGE, EMPTY DISPOSABLE MISCELLANEOUS
Qty: 100 EACH | Refills: 0 | Status: SHIPPED | OUTPATIENT
Start: 2023-03-22 | End: 2023-07-12

## 2023-03-22 RX ORDER — NEEDLES, DISPOSABLE 27GX1/2"
NEEDLE, DISPOSABLE MISCELLANEOUS
Qty: 100 EACH | Refills: 0 | Status: SHIPPED | OUTPATIENT
Start: 2023-03-22 | End: 2023-07-12

## 2023-03-22 RX ORDER — ESCITALOPRAM OXALATE 10 MG/1
10 TABLET ORAL DAILY
Status: ON HOLD | COMMUNITY
Start: 2023-03-14 | End: 2023-07-05

## 2023-03-22 RX ADMIN — TESTOSTERONE CYPIONATE 200 MG: 200 INJECTION, SOLUTION INTRAMUSCULAR at 02:03

## 2023-03-22 NOTE — PROGRESS NOTES
Subjective:      Wilbur Diop Jr. is a 43 y.o. male who returns today regarding his hypogonadism.     Hypogonadism  The patient reports a recent lab test indicating low testosterone. He reports associated symptoms including low libido, ED, fatigue and anxiety/depression. These symptoms have been present for several years and were gradual in onset.  Previous treatment has included none.  Associated medical conditions include sleep apnea prediabetes.  His BMI is 34.3.     + family history of prostate cancer (grandfather diagnosed in his 60s).      Component      Latest Ref Rng & Units 2/7/2023 12/16/2022 11/17/2022   Testosterone      250 - 1100 ng/dL 201 (L)       Testosterone, Free      46.0 - 224.0 pg/mL 41.8 (L)       Testosterone, Bioavailable      110.0 - 575.0 ng/dL 84.2 (L)       Sex Hormone Binding Globulin      10 - 50 nmol/L 16       Albumin      3.6 - 5.1 g/dL 4.4       Testosterone, Total      304 - 1227 ng/dL   202 (L) 186 (L)      Has met with endocrine to discuss treatment options.   MRI ordered, pt declined   Desires no additional children.      Wife works in healthcare and is capable of administering an injection.     He presents today for first injection and teaching.     The following portions of the patient's history were reviewed and updated as appropriate: allergies, current medications, past family history, past medical history, past social history, past surgical history and problem list.    Review of Systems  Constitutional: no fever or chills  ENT: no nasal congestion or sore throat  Respiratory: no cough or shortness of breath  Cardiovascular: no chest pain or palpitations  Gastrointestinal: no nausea or vomiting, tolerating diet  Genitourinary: as per HPI  Hematologic/Lymphatic: no easy bruising or lymphadenopathy  Musculoskeletal: no arthralgias or myalgias  Neurological: no seizures or tremors  Behavioral/Psych: no auditory or visual hallucinations     Objective:   Vitals: There  "were no vitals filed for this visit.    Physical Exam   General: alert and oriented, no acute distress  Head: normocephalic, atraumatic  Neck: supple, normal ROM  Respiratory: Symmetric expansion, non-labored breathing  Cardiovascular: regular rate and rhythm  Abdomen: soft, non tender, non distended  Genitourinary: deferred  Skin: normal coloration and turgor, no rashes, no suspicious skin lesions noted  Neuro: alert and oriented x3, no gross deficits  Psych: normal judgment and insight, normal mood/affect, and non-anxious    Lab Review   Urinalysis demonstrates: no sample   Lab Results   Component Value Date    WBC 8.22 02/07/2023    HGB 14.7 02/07/2023    HCT 44.1 02/07/2023    MCV 88 02/07/2023     02/07/2023     Lab Results   Component Value Date    CREATININE 1.0 01/17/2023    BUN 18 01/17/2023     Lab Results   Component Value Date    PSA 0.20 11/17/2022     Imaging   None    Pt here today for testosterone injection.  Testosterone cypionate 200mg IM administered to right dorsoglut.  Tolerated well.    Assessment:     1. Hypogonadism in male      Plan:   Diagnoses and all orders for this visit:    Hypogonadism in male  -     testosterone cypionate (DEPOTESTOTERONE CYPIONATE) 200 mg/mL injection; Inject 1 mL (200 mg total) into the muscle every 14 (fourteen) days.  -     syringe, disposable, (BD LUER-GILL SYRINGE) 3 mL Syrg; For T injection  -     needle, disp, 21 G 21 gauge x 1 1/2" Ndle; For T injection  -     needle, disp, 18 G 18 gauge x 1" Ndle; For T injection      Plan:  --Pt instructed on the proper preparation and administration of T cypionate. He will self inject 200 mg Q2 weeks IM.  --Trough T level in 6 weeks  --CBC, PSA, testosterone Q 6 months     "

## 2023-03-27 ENCOUNTER — HOSPITAL ENCOUNTER (OUTPATIENT)
Dept: CARDIOLOGY | Facility: CLINIC | Age: 43
Discharge: HOME | End: 2023-03-27
Payer: MEDICAID

## 2023-03-27 ENCOUNTER — PATIENT MESSAGE (OUTPATIENT)
Dept: ELECTROPHYSIOLOGY | Facility: CLINIC | Age: 43
End: 2023-03-27
Payer: MEDICAID

## 2023-03-27 DIAGNOSIS — I48.0 PAROXYSMAL ATRIAL FIBRILLATION: ICD-10-CM

## 2023-03-27 PROCEDURE — 93005 ELECTROCARDIOGRAM TRACING: CPT | Mod: PBBFAC | Performed by: INTERNAL MEDICINE

## 2023-03-27 PROCEDURE — 93010 RHYTHM STRIP: ICD-10-PCS | Mod: S$PBB,,, | Performed by: INTERNAL MEDICINE

## 2023-03-27 PROCEDURE — 93010 ELECTROCARDIOGRAM REPORT: CPT | Mod: S$PBB,,, | Performed by: INTERNAL MEDICINE

## 2023-03-30 ENCOUNTER — PATIENT MESSAGE (OUTPATIENT)
Dept: ELECTROPHYSIOLOGY | Facility: CLINIC | Age: 43
End: 2023-03-30
Payer: MEDICAID

## 2023-04-03 ENCOUNTER — HOSPITAL ENCOUNTER (OUTPATIENT)
Dept: RADIOLOGY | Facility: HOSPITAL | Age: 43
Discharge: HOME | End: 2023-04-03
Attending: INTERNAL MEDICINE
Payer: MEDICAID

## 2023-04-03 ENCOUNTER — OFFICE VISIT (OUTPATIENT)
Dept: PRIMARY CARE CLINIC | Facility: CLINIC | Age: 43
End: 2023-04-03
Payer: MEDICAID

## 2023-04-03 ENCOUNTER — PATIENT MESSAGE (OUTPATIENT)
Dept: ELECTROPHYSIOLOGY | Facility: CLINIC | Age: 43
End: 2023-04-03
Payer: MEDICAID

## 2023-04-03 ENCOUNTER — PATIENT MESSAGE (OUTPATIENT)
Dept: PRIMARY CARE CLINIC | Facility: CLINIC | Age: 43
End: 2023-04-03

## 2023-04-03 ENCOUNTER — TELEPHONE (OUTPATIENT)
Dept: ELECTROPHYSIOLOGY | Facility: CLINIC | Age: 43
End: 2023-04-03
Payer: MEDICAID

## 2023-04-03 VITALS
BODY MASS INDEX: 35.75 KG/M2 | DIASTOLIC BLOOD PRESSURE: 90 MMHG | SYSTOLIC BLOOD PRESSURE: 132 MMHG | HEIGHT: 69 IN | OXYGEN SATURATION: 98 % | HEART RATE: 99 BPM | WEIGHT: 241.38 LBS

## 2023-04-03 DIAGNOSIS — M79.672 LEFT FOOT PAIN: ICD-10-CM

## 2023-04-03 DIAGNOSIS — I48.3 TYPICAL ATRIAL FLUTTER: ICD-10-CM

## 2023-04-03 DIAGNOSIS — I47.10 PSVT (PAROXYSMAL SUPRAVENTRICULAR TACHYCARDIA): ICD-10-CM

## 2023-04-03 DIAGNOSIS — M79.672 LEFT FOOT PAIN: Primary | ICD-10-CM

## 2023-04-03 PROCEDURE — 99214 PR OFFICE/OUTPT VISIT, EST, LEVL IV, 30-39 MIN: ICD-10-PCS | Mod: S$PBB,,, | Performed by: INTERNAL MEDICINE

## 2023-04-03 PROCEDURE — 73630 X-RAY EXAM OF FOOT: CPT | Mod: TC,LT

## 2023-04-03 PROCEDURE — 1159F PR MEDICATION LIST DOCUMENTED IN MEDICAL RECORD: ICD-10-PCS | Mod: CPTII,,, | Performed by: INTERNAL MEDICINE

## 2023-04-03 PROCEDURE — 3008F PR BODY MASS INDEX (BMI) DOCUMENTED: ICD-10-PCS | Mod: CPTII,,, | Performed by: INTERNAL MEDICINE

## 2023-04-03 PROCEDURE — 73630 X-RAY EXAM OF FOOT: CPT | Mod: 26,LT,, | Performed by: RADIOLOGY

## 2023-04-03 PROCEDURE — 73630 XR FOOT COMPLETE 3 VIEW LEFT: ICD-10-PCS | Mod: 26,LT,, | Performed by: RADIOLOGY

## 2023-04-03 PROCEDURE — 3008F BODY MASS INDEX DOCD: CPT | Mod: CPTII,,, | Performed by: INTERNAL MEDICINE

## 2023-04-03 PROCEDURE — 3080F DIAST BP >= 90 MM HG: CPT | Mod: CPTII,,, | Performed by: INTERNAL MEDICINE

## 2023-04-03 PROCEDURE — 1159F MED LIST DOCD IN RCRD: CPT | Mod: CPTII,,, | Performed by: INTERNAL MEDICINE

## 2023-04-03 PROCEDURE — 99214 OFFICE O/P EST MOD 30 MIN: CPT | Mod: S$PBB,,, | Performed by: INTERNAL MEDICINE

## 2023-04-03 PROCEDURE — 3075F PR MOST RECENT SYSTOLIC BLOOD PRESS GE 130-139MM HG: ICD-10-PCS | Mod: CPTII,,, | Performed by: INTERNAL MEDICINE

## 2023-04-03 PROCEDURE — 3080F PR MOST RECENT DIASTOLIC BLOOD PRESSURE >= 90 MM HG: ICD-10-PCS | Mod: CPTII,,, | Performed by: INTERNAL MEDICINE

## 2023-04-03 PROCEDURE — 99999 PR PBB SHADOW E&M-EST. PATIENT-LVL IV: CPT | Mod: PBBFAC,,, | Performed by: INTERNAL MEDICINE

## 2023-04-03 PROCEDURE — 3075F SYST BP GE 130 - 139MM HG: CPT | Mod: CPTII,,, | Performed by: INTERNAL MEDICINE

## 2023-04-03 PROCEDURE — 99214 OFFICE O/P EST MOD 30 MIN: CPT | Mod: PBBFAC | Performed by: INTERNAL MEDICINE

## 2023-04-03 PROCEDURE — 99999 PR PBB SHADOW E&M-EST. PATIENT-LVL IV: ICD-10-PCS | Mod: PBBFAC,,, | Performed by: INTERNAL MEDICINE

## 2023-04-03 RX ORDER — COLCHICINE 0.6 MG/1
0.6 CAPSULE ORAL DAILY
Qty: 14 CAPSULE | Refills: 0 | Status: SHIPPED | OUTPATIENT
Start: 2023-04-03 | End: 2023-05-03

## 2023-04-03 NOTE — TELEPHONE ENCOUNTER
Spoke to pt and told them we do not deal w/ feet but that they could reach out to primary care and or a podiatrist.

## 2023-04-03 NOTE — PROGRESS NOTES
[facility] Note    Chief Complaint      Chief Complaint   Patient presents with    Foot Pain     Left foot       History of Present Illness      Wilbur Diop Jr. is a 43 y.o. male with chronic conditions of SVT, atrial flutter, anxiety, ADD who presents today for: follow up chronic conditions and complaints of left medial dorsal foot pain.  Reports jamming left 2nd toe 2 weeks ago.  Then started with more medial dorsal foot pain overlying MT-T joint.   Anxiety, depression, ADD: Sees Dr. Malin. Has tried sertraline, buspirone, rexulti, different stimulants (Azstarys).  Now on lexapro and focalin xr.    SVT, atrial flutter: Sees Dr. Daugherty, EP cardiology.  On flecainide, verapamil.  Flu shot declines.  Tdap 2016.  Shingrix due at age 48.  Pneumonia vaccine due at age 63.  C scope due at age 43.  PSA due at age 43.    Past Medical History:  Past Medical History:   Diagnosis Date    ADHD (attention deficit hyperactivity disorder)     Allergy     Anxiety     Atrial fibrillation     Atrial flutter     Depression     History of psychiatric hospitalization     2002 for depression    Hx of psychiatric care     Psychiatric problem     Supraventricular tachycardia     Therapy        Past Surgical History:   has a past surgical history that includes Appendectomy; Hernia repair; ablation, atrial fibrillation, cryo (N/A, 01/24/2023); and Transesophageal echocardiography (N/A, 01/24/2023).    Family History:  family history includes Anxiety disorder in his mother; Cancer in his maternal grandfather; Depression in his mother; No Known Problems in his brother, father, maternal aunt, maternal grandmother, maternal uncle, paternal aunt, paternal grandfather, paternal grandmother, paternal uncle, and sister.     Social History:  Social History     Tobacco Use    Smoking status: Former     Packs/day: 0.00     Years: 0.00     Pack years: 0.00     Types: Cigarettes     Quit date: 10/22/2004     Years since  "quittin.4    Smokeless tobacco: Never   Substance Use Topics    Alcohol use: Not Currently     Comment: once a month; socially    Drug use: No       I personally reviewed all past medical, surgical, social and family history.    Review of Systems   Constitutional:  Negative for chills, fever and malaise/fatigue.   Respiratory:  Negative for shortness of breath.    Cardiovascular:  Negative for chest pain.   Gastrointestinal:  Negative for constipation, diarrhea, nausea and vomiting.   Skin:  Negative for rash.   Neurological:  Negative for weakness.   All other systems reviewed and are negative.     Medications:  Outpatient Encounter Medications as of 4/3/2023   Medication Sig Dispense Refill    cetirizine (ZYRTEC) 5 MG tablet Take 5 mg by mouth 2 (two) times daily as needed.      colchicine (MITIGARE) 0.6 mg Cap Take 1 capsule (0.6 mg total) by mouth once daily. 14 capsule 0    dexmethylphenidate (FOCALIN XR) 5 MG 24 hr capsule Take 5 mg by mouth every morning.      ergocalciferol, vitamin D2, (VITAMIN D ORAL) Take by mouth.      EScitalopram oxalate (LEXAPRO) 10 MG tablet Take 10 mg by mouth once daily.      flecainide (TAMBOCOR) 100 MG Tab Take 1 tablet (100 mg total) by mouth every 12 (twelve) hours. 180 tablet 3    LORazepam (ATIVAN) 0.5 MG tablet Take 0.5 mg by mouth 2 (two) times daily as needed.      MAGNESIUM ORAL Take by mouth.      multivitamin capsule Take 1 capsule by mouth once daily.      needle, disp, 18 G 18 gauge x 1" Ndle For T injection 100 each 0    needle, disp, 21 G 21 gauge x 1 1/2" Ndle For T injection 100 each 0    pantoprazole (PROTONIX) 40 MG tablet Take 1 tablet (40 mg total) by mouth once daily. (Patient not taking: Reported on 3/9/2023) 30 tablet 0    rivaroxaban (XARELTO) 20 mg Tab Take 1 tablet (20 mg total) by mouth daily with dinner or evening meal. 90 tablet 3    syringe, disposable, (BD LUER-GILL SYRINGE) 3 mL Syrg For T injection 100 each 0    testosterone cypionate " "(DEPOTESTOTERONE CYPIONATE) 200 mg/mL injection Inject 1 mL (200 mg total) into the muscle every 14 (fourteen) days. 13 mL 0    verapamiL (CALAN-SR) 120 MG CR tablet Take 1 tablet (120 mg total) by mouth every evening. 30 tablet 11    zinc 50 mg Tab Take by mouth.      [DISCONTINUED] brexpiprazole (REXULTI) 1 mg Tab Take 1 mg by mouth once daily.      [DISCONTINUED] clonazepam (KLONOPIN ORAL) Take by mouth daily as needed.      [DISCONTINUED] serdexmethylphen-dexmethylphen 26.1 mg- 5.2 mg Cap Take 1 capsule by mouth once daily.      [DISCONTINUED] venlafaxine (EFFEXOR-XR) 75 MG 24 hr capsule Take 75 mg by mouth once daily.       Facility-Administered Encounter Medications as of 4/3/2023   Medication Dose Route Frequency Provider Last Rate Last Admin    testosterone cypionate injection 200 mg  200 mg Intramuscular Q14 Days Sharron Klein, NP   200 mg at 03/22/23 1446       Allergies:  Review of patient's allergies indicates:  No Known Allergies    Health Maintenance:  Immunization History   Administered Date(s) Administered    Influenza - Intradermal - Quadrivalent - PF 10/22/2014    Influenza - Intradermal - Trivalent - PF 10/22/2014    Influenza - Quadrivalent 10/19/2015    Influenza - Quadrivalent - PF *Preferred* (6 months and older) 01/30/2019    Tdap 11/07/2016      Health Maintenance   Topic Date Due    TETANUS VACCINE  11/07/2026    Lipid Panel  11/17/2027    Hepatitis C Screening  Completed        Physical Exam      Vital Signs  Pulse: 99  SpO2: 98 %  BP: (!) 132/90  BP Location: Right arm  Patient Position: Sitting  Pain Score:   7  Height and Weight  Height: 5' 9" (175.3 cm)  Weight: 109.5 kg (241 lb 6.5 oz)  BSA (Calculated - sq m): 2.31 sq meters  BMI (Calculated): 35.6  Weight in (lb) to have BMI = 25: 168.9]    Physical Exam  Vitals reviewed.   Constitutional:       Appearance: He is well-developed.   HENT:      Head: Normocephalic and atraumatic.      Right Ear: External ear normal.      Left Ear: " External ear normal.   Cardiovascular:      Rate and Rhythm: Normal rate and regular rhythm.      Heart sounds: Normal heart sounds. No murmur heard.  Pulmonary:      Effort: Pulmonary effort is normal.      Breath sounds: Normal breath sounds. No wheezing or rales.   Abdominal:      General: Bowel sounds are normal.      Palpations: Abdomen is soft.        Laboratory:  CBC:  Recent Labs   Lab 11/17/22 0842 01/17/23  0905 02/07/23  0815   WBC 7.29 10.46 8.22   RBC 5.38 5.21 5.01   Hemoglobin 15.8 15.0 14.7   Hematocrit 47.3 46.3 44.1   Platelets 328 301 337   MCV 88 89 88   MCH 29.4 28.8 29.3   MCHC 33.4 32.4 33.3     CMP:  Recent Labs   Lab 02/23/22 2001 03/07/22 1413 09/27/22 1201 11/17/22 0842 01/17/23  0905 02/07/23  0815   Glucose 82   < > 94 115 H 126 H  --    Calcium 9.5   < > 9.6 10.2 10.2  --    Albumin 4.0  --  4.2 4.1  --  4.4   Total Protein 7.5  --  7.8 7.6  --   --    Sodium 140   < > 136 138 141  --    Potassium 3.8   < > 4.1 4.8 4.4  --    CO2 21 L   < > 25 24 25  --    Chloride 106   < > 101 101 103  --    BUN 21 H   < > 14 15 18  --    Alkaline Phosphatase 67  --  75 69  --   --    ALT 30  --  27 36  --   --    AST 23  --  17 22  --   --    Total Bilirubin 0.4  --  0.4 0.7  --   --     < > = values in this interval not displayed.     URINALYSIS:       LIPIDS:  Recent Labs   Lab 02/11/22 2248 09/27/22  1201 11/17/22  0842   TSH 1.821 1.147 1.154   HDL  --   --  52   Cholesterol  --   --  244 H   Triglycerides  --   --  74   LDL Cholesterol  --   --  177.2 H   HDL/Cholesterol Ratio  --   --  21.3   Non-HDL Cholesterol  --   --  192   Total Cholesterol/HDL Ratio  --   --  4.7     TSH:  Recent Labs   Lab 02/11/22 2248 09/27/22  1201 11/17/22  0842   TSH 1.821 1.147 1.154     A1C:  Recent Labs   Lab 11/17/22  0842   Hemoglobin A1C 6.0 H       Assessment/Plan     Wilbur Diop Jr. is a 43 y.o.male with:    1. Left foot pain  - X-Ray Foot Complete Left; Future  - colchicine (MITIGARE) 0.6 mg  Cap; Take 1 capsule (0.6 mg total) by mouth once daily.  Dispense: 14 capsule; Refill: 0  Start colchicine for presumptive gout.  Send for xray to eval for stress fracture.  2. PSVT (paroxysmal supraventricular tachycardia)  3. Typical atrial flutter  Continue current meds.       Chronic conditions status updated as per HPI.  Other than changes above, cont current medications and maintain follow up with specialists.  No follow-ups on file.    Future Appointments   Date Time Provider Department Center   4/21/2023  9:30 AM EKG, APPT [facility] EKG [facility]   4/21/2023 10:00 AM Carlos Daugherty MD [facility] ARRHYTH [facility]   4/27/2023 10:30 AM Lizbeth Bcuhanan NP [facility] ENDODIA [facility]   5/24/2023  3:30 PM Javon Aldana MD [facility] SLEEP [facility]       Ge Naranjo MD  [facility]

## 2023-04-19 ENCOUNTER — PATIENT MESSAGE (OUTPATIENT)
Dept: UROLOGY | Facility: CLINIC | Age: 43
End: 2023-04-19
Payer: MEDICAID

## 2023-04-21 ENCOUNTER — PATIENT MESSAGE (OUTPATIENT)
Dept: ELECTROPHYSIOLOGY | Facility: CLINIC | Age: 43
End: 2023-04-21
Payer: MEDICAID

## 2023-04-21 ENCOUNTER — PATIENT MESSAGE (OUTPATIENT)
Dept: ADMINISTRATIVE | Facility: HOSPITAL | Age: 43
End: 2023-04-21
Payer: MEDICAID

## 2023-04-25 DIAGNOSIS — I48.3 TYPICAL ATRIAL FLUTTER: Primary | ICD-10-CM

## 2023-04-25 RX ORDER — VERAPAMIL HYDROCHLORIDE 240 MG/1
240 TABLET, FILM COATED, EXTENDED RELEASE ORAL NIGHTLY
Qty: 90 TABLET | Refills: 3 | Status: SHIPPED | OUTPATIENT
Start: 2023-04-25 | End: 2023-06-29

## 2023-04-25 NOTE — TELEPHONE ENCOUNTER
Called pt informed of Dr Daugherty's recommendation to increase the verapamil to 240mg daily, new erx sent

## 2023-04-25 NOTE — TELEPHONE ENCOUNTER
Spoke with pt, reports he had to miss his appt last week due to not feeling well, rescheduled for 6/7, he asked about stopping his blood thinner, flecainide and verapamil, explained that he needs to continue that until seen by Dr Daugherty for reassessment. Reports b/p has been elevated for over a week 140s-150s/90s-100s, previously at home was 130s/90s, did start testosterone injections has received 3 so far, is starting some other treatment for depression, reports he has put on some weight due to his depression, denies taking any OTC cold medicine recently, discussed lowering stress levels, reducing sodium intake, and weight reduction as ways to lower b/p, will message supervisor for any sooner appt if available, sent message to Dr Daugherty to address HTN

## 2023-05-01 ENCOUNTER — LAB VISIT (OUTPATIENT)
Dept: LAB | Facility: HOSPITAL | Age: 43
End: 2023-05-01
Payer: MEDICAID

## 2023-05-01 ENCOUNTER — PATIENT MESSAGE (OUTPATIENT)
Dept: ELECTROPHYSIOLOGY | Facility: CLINIC | Age: 43
End: 2023-05-01
Payer: MEDICAID

## 2023-05-01 DIAGNOSIS — E29.1 HYPOGONADISM IN MALE: ICD-10-CM

## 2023-05-01 LAB — TESTOST SERPL-MCNC: 502 NG/DL (ref 304–1227)

## 2023-05-01 PROCEDURE — 36415 COLL VENOUS BLD VENIPUNCTURE: CPT | Mod: PO | Performed by: NURSE PRACTITIONER

## 2023-05-01 PROCEDURE — 84403 ASSAY OF TOTAL TESTOSTERONE: CPT | Performed by: NURSE PRACTITIONER

## 2023-05-03 ENCOUNTER — PATIENT MESSAGE (OUTPATIENT)
Dept: PRIMARY CARE CLINIC | Facility: CLINIC | Age: 43
End: 2023-05-03

## 2023-05-03 ENCOUNTER — OFFICE VISIT (OUTPATIENT)
Dept: PRIMARY CARE CLINIC | Facility: CLINIC | Age: 43
End: 2023-05-03
Payer: MEDICAID

## 2023-05-03 VITALS
TEMPERATURE: 97 F | HEIGHT: 69 IN | OXYGEN SATURATION: 98 % | DIASTOLIC BLOOD PRESSURE: 108 MMHG | WEIGHT: 239 LBS | BODY MASS INDEX: 35.4 KG/M2 | SYSTOLIC BLOOD PRESSURE: 140 MMHG | HEART RATE: 90 BPM | RESPIRATION RATE: 18 BRPM

## 2023-05-03 DIAGNOSIS — I10 PRIMARY HYPERTENSION: Primary | ICD-10-CM

## 2023-05-03 DIAGNOSIS — I48.3 TYPICAL ATRIAL FLUTTER: ICD-10-CM

## 2023-05-03 PROCEDURE — 3008F BODY MASS INDEX DOCD: CPT | Mod: CPTII,,, | Performed by: INTERNAL MEDICINE

## 2023-05-03 PROCEDURE — 1159F PR MEDICATION LIST DOCUMENTED IN MEDICAL RECORD: ICD-10-PCS | Mod: CPTII,,, | Performed by: INTERNAL MEDICINE

## 2023-05-03 PROCEDURE — 3077F PR MOST RECENT SYSTOLIC BLOOD PRESSURE >= 140 MM HG: ICD-10-PCS | Mod: CPTII,,, | Performed by: INTERNAL MEDICINE

## 2023-05-03 PROCEDURE — 99999 PR PBB SHADOW E&M-EST. PATIENT-LVL IV: CPT | Mod: PBBFAC,,, | Performed by: INTERNAL MEDICINE

## 2023-05-03 PROCEDURE — 99999 PR PBB SHADOW E&M-EST. PATIENT-LVL IV: ICD-10-PCS | Mod: PBBFAC,,, | Performed by: INTERNAL MEDICINE

## 2023-05-03 PROCEDURE — 99213 PR OFFICE/OUTPT VISIT, EST, LEVL III, 20-29 MIN: ICD-10-PCS | Mod: S$PBB,,, | Performed by: INTERNAL MEDICINE

## 2023-05-03 PROCEDURE — 3080F DIAST BP >= 90 MM HG: CPT | Mod: CPTII,,, | Performed by: INTERNAL MEDICINE

## 2023-05-03 PROCEDURE — 3077F SYST BP >= 140 MM HG: CPT | Mod: CPTII,,, | Performed by: INTERNAL MEDICINE

## 2023-05-03 PROCEDURE — 99214 OFFICE O/P EST MOD 30 MIN: CPT | Mod: PBBFAC | Performed by: INTERNAL MEDICINE

## 2023-05-03 PROCEDURE — 3080F PR MOST RECENT DIASTOLIC BLOOD PRESSURE >= 90 MM HG: ICD-10-PCS | Mod: CPTII,,, | Performed by: INTERNAL MEDICINE

## 2023-05-03 PROCEDURE — 3008F PR BODY MASS INDEX (BMI) DOCUMENTED: ICD-10-PCS | Mod: CPTII,,, | Performed by: INTERNAL MEDICINE

## 2023-05-03 PROCEDURE — 99213 OFFICE O/P EST LOW 20 MIN: CPT | Mod: S$PBB,,, | Performed by: INTERNAL MEDICINE

## 2023-05-03 PROCEDURE — 1159F MED LIST DOCD IN RCRD: CPT | Mod: CPTII,,, | Performed by: INTERNAL MEDICINE

## 2023-05-03 RX ORDER — ESKETAMINE HYDROCHLORIDE 28 MG/.2ML
56 SOLUTION NASAL WEEKLY
COMMUNITY
Start: 2023-05-02 | End: 2023-12-04

## 2023-05-03 RX ORDER — LISINOPRIL 20 MG/1
20 TABLET ORAL DAILY
Qty: 90 TABLET | Refills: 3 | Status: SHIPPED | OUTPATIENT
Start: 2023-05-03 | End: 2023-10-02 | Stop reason: SDUPTHER

## 2023-05-03 NOTE — PROGRESS NOTES
HPI:  Wilbur Diop Jr. is a 43 y.o. year old male that  presents with   Chief Complaint   Patient presents with    Follow-up     C/o high b/p    .       Past Medical History:   Diagnosis Date    ADHD (attention deficit hyperactivity disorder)     Allergy     Anxiety     Atrial fibrillation     Atrial flutter     Depression     History of psychiatric hospitalization      for depression    Hx of psychiatric care     Psychiatric problem     Supraventricular tachycardia     Therapy      Social History     Socioeconomic History    Marital status:     Number of children: 2   Tobacco Use    Smoking status: Former     Packs/day: 0.00     Years: 0.00     Pack years: 0.00     Types: Cigarettes     Quit date: 10/22/2004     Years since quittin.5    Smokeless tobacco: Never   Substance and Sexual Activity    Alcohol use: Not Currently     Comment: once a month; socially    Drug use: No    Sexual activity: Not Currently     Partners: Female     Birth control/protection: None   Other Topics Concern    Patient feels they ought to cut down on drinking/drug use No    Patient annoyed by others criticizing their drinking/drug use No    Patient has felt bad or guilty about drinking/drug use No    Patient has had a drink/used drugs as an eye opener in the AM No     Past Surgical History:   Procedure Laterality Date    ABLATION, ATRIAL FIBRILLATION, CRYO N/A 2023    Procedure: Ablation, Atrial Fibrillation, Cryo;  Surgeon: Carlos Daugherty MD;  Location: [facility] EP LAB;  Service: Cardiology;  Laterality: N/A;  AF, PVI, CRYO, RAFAEL, CHANEL, GEN, DM, 3prep    APPENDECTOMY      HERNIA REPAIR      TRANSESOPHAGEAL ECHOCARDIOGRAPHY N/A 2023    Procedure: ECHOCARDIOGRAM, TRANSESOPHAGEAL;  Surgeon: Stacia Howell MD;  Location: [facility] EP LAB;  Service: Cardiology;  Laterality: N/A;     Family History   Problem Relation Age of Onset    Depression Mother     Anxiety disorder Mother     No Known Problems Father      "No Known Problems Sister     No Known Problems Brother     No Known Problems Maternal Aunt     No Known Problems Maternal Uncle     No Known Problems Paternal Aunt     No Known Problems Paternal Uncle     No Known Problems Maternal Grandmother     Cancer Maternal Grandfather         Prostate cancer recovered    No Known Problems Paternal Grandmother     No Known Problems Paternal Grandfather     Amblyopia Neg Hx     Blindness Neg Hx     Cataracts Neg Hx     Diabetes Neg Hx     Glaucoma Neg Hx     Hypertension Neg Hx     Macular degeneration Neg Hx     Retinal detachment Neg Hx     Strabismus Neg Hx     Stroke Neg Hx     Thyroid disease Neg Hx            Pt with 2 weeks of elevated bp readings.  SBP as high as 190 and dbp consistently in 100's.  Pt does report increased anxiety and stress.  No chest pains.  Does report feeling a bit SOB with exertion.  He is currently doing pool work.  No N/V.  Pt with a hx of arrhythmia ans sees EP who increased his verapamil.  No real change in BP readings with that change.      Health Maintenance Topics with due status: Overdue       Topic Date Due    Pneumococcal Vaccines (Age 0-64) Never done    COVID-19 Vaccine 08/31/2021    Colorectal Cancer Screening Never done       Review of Systems  Review of Systems   Constitutional:  Positive for malaise/fatigue. Negative for chills and fever.   Respiratory:  Negative for cough and wheezing.    Cardiovascular:  Negative for palpitations and claudication.   Neurological:  Negative for dizziness and headaches.       Physical Exam:  BP (!) 140/108 (BP Location: Right arm, Patient Position: Sitting, BP Method: Large (Manual))   Pulse 90   Temp 97 °F (36.1 °C) (Oral)   Resp 18   Ht 5' 9" (1.753 m)   Wt 108.4 kg (238 lb 15.7 oz)   SpO2 98%   BMI 35.29 kg/m²   Physical Exam  Neck:      Vascular: No carotid bruit.   Cardiovascular:      Rate and Rhythm: Normal rate and regular rhythm.      Pulses: Normal pulses.      Heart sounds: Normal " heart sounds. No murmur heard.  Pulmonary:      Effort: Pulmonary effort is normal.      Breath sounds: Normal breath sounds.   Neurological:      Mental Status: He is alert.         LABS:    Recent Results (from the past 2016 hour(s))   Testosterone    Collection Time: 05/01/23  3:43 PM   Result Value Ref Range    Testosterone, Total 502 304 - 1227 ng/dL       Imaging:        Assessment:    ICD-10-CM ICD-9-CM    1. Primary hypertension  I10 401.9 BASIC METABOLIC PANEL      2. Typical atrial flutter  I48.3 427.32         The primary encounter diagnosis was Primary hypertension. A diagnosis of Typical atrial flutter was also pertinent to this visit.      Plan:  Orders Placed This Encounter   Procedures    BASIC METABOLIC PANEL       Pt with new DX of HTN.  Discussed with pt adding lisinopril in AM.  He will take his Bps at home and call with results.  Will get BMP to ck K in 2 weeks.  Discuss potential SE of cough and angioedema.      Reassured pt re his BP and that his symptoms should improve with better control, but message us if there are any issues.      Blayne Bahena MD

## 2023-05-13 LAB — HEMOCCULT STL QL IA: NEGATIVE

## 2023-05-15 ENCOUNTER — HOSPITAL ENCOUNTER (EMERGENCY)
Facility: HOSPITAL | Age: 43
Discharge: HOME | End: 2023-05-15
Attending: EMERGENCY MEDICINE
Payer: MEDICAID

## 2023-05-15 ENCOUNTER — PATIENT MESSAGE (OUTPATIENT)
Dept: PRIMARY CARE CLINIC | Facility: CLINIC | Age: 43
End: 2023-05-15
Payer: MEDICAID

## 2023-05-15 ENCOUNTER — PATIENT MESSAGE (OUTPATIENT)
Dept: ELECTROPHYSIOLOGY | Facility: CLINIC | Age: 43
End: 2023-05-15
Payer: MEDICAID

## 2023-05-15 VITALS
OXYGEN SATURATION: 98 % | RESPIRATION RATE: 16 BRPM | WEIGHT: 240 LBS | TEMPERATURE: 98 F | HEART RATE: 85 BPM | DIASTOLIC BLOOD PRESSURE: 81 MMHG | SYSTOLIC BLOOD PRESSURE: 140 MMHG | BODY MASS INDEX: 35.55 KG/M2 | HEIGHT: 69 IN

## 2023-05-15 DIAGNOSIS — R07.9 CHEST PAIN, UNSPECIFIED TYPE: Primary | ICD-10-CM

## 2023-05-15 DIAGNOSIS — R07.9 CHEST PAIN: ICD-10-CM

## 2023-05-15 LAB
ALBUMIN SERPL BCP-MCNC: 4.3 G/DL (ref 3.5–5.2)
ALP SERPL-CCNC: 70 U/L (ref 55–135)
ALT SERPL W/O P-5'-P-CCNC: 30 U/L (ref 10–44)
ANION GAP SERPL CALC-SCNC: 14 MMOL/L (ref 8–16)
AST SERPL-CCNC: 19 U/L (ref 10–40)
BASOPHILS # BLD AUTO: 0.04 K/UL (ref 0–0.2)
BASOPHILS NFR BLD: 0.3 % (ref 0–1.9)
BILIRUB SERPL-MCNC: 0.5 MG/DL (ref 0.1–1)
BNP SERPL-MCNC: <10 PG/ML (ref 0–99)
BUN SERPL-MCNC: 14 MG/DL (ref 6–20)
CALCIUM SERPL-MCNC: 9.4 MG/DL (ref 8.7–10.5)
CHLORIDE SERPL-SCNC: 103 MMOL/L (ref 95–110)
CO2 SERPL-SCNC: 22 MMOL/L (ref 23–29)
CREAT SERPL-MCNC: 0.9 MG/DL (ref 0.5–1.4)
DIFFERENTIAL METHOD: ABNORMAL
EOSINOPHIL # BLD AUTO: 0.1 K/UL (ref 0–0.5)
EOSINOPHIL NFR BLD: 1 % (ref 0–8)
ERYTHROCYTE [DISTWIDTH] IN BLOOD BY AUTOMATED COUNT: 12.9 % (ref 11.5–14.5)
EST. GFR  (NO RACE VARIABLE): >60 ML/MIN/1.73 M^2
GLUCOSE SERPL-MCNC: 92 MG/DL (ref 70–110)
HCT VFR BLD AUTO: 44.9 % (ref 40–54)
HCV AB SERPL QL IA: NORMAL
HGB BLD-MCNC: 14.8 G/DL (ref 14–18)
HIV 1+2 AB+HIV1 P24 AG SERPL QL IA: NORMAL
IMM GRANULOCYTES # BLD AUTO: 0.04 K/UL (ref 0–0.04)
IMM GRANULOCYTES NFR BLD AUTO: 0.3 % (ref 0–0.5)
LYMPHOCYTES # BLD AUTO: 3.1 K/UL (ref 1–4.8)
LYMPHOCYTES NFR BLD: 22.9 % (ref 18–48)
MCH RBC QN AUTO: 28.8 PG (ref 27–31)
MCHC RBC AUTO-ENTMCNC: 33 G/DL (ref 32–36)
MCV RBC AUTO: 88 FL (ref 82–98)
MONOCYTES # BLD AUTO: 1.2 K/UL (ref 0.3–1)
MONOCYTES NFR BLD: 8.8 % (ref 4–15)
NEUTROPHILS # BLD AUTO: 8.9 K/UL (ref 1.8–7.7)
NEUTROPHILS NFR BLD: 66.7 % (ref 38–73)
NRBC BLD-RTO: 0 /100 WBC
PLATELET # BLD AUTO: 329 K/UL (ref 150–450)
PMV BLD AUTO: 9.7 FL (ref 9.2–12.9)
POC CARDIAC TROPONIN I: 0 NG/ML (ref 0–0.08)
POTASSIUM SERPL-SCNC: 3.9 MMOL/L (ref 3.5–5.1)
PROT SERPL-MCNC: 7.5 G/DL (ref 6–8.4)
RBC # BLD AUTO: 5.13 M/UL (ref 4.6–6.2)
SAMPLE: NORMAL
SODIUM SERPL-SCNC: 139 MMOL/L (ref 136–145)
TROPONIN I SERPL DL<=0.01 NG/ML-MCNC: 0.01 NG/ML (ref 0–0.03)
WBC # BLD AUTO: 13.34 K/UL (ref 3.9–12.7)

## 2023-05-15 PROCEDURE — 84484 ASSAY OF TROPONIN QUANT: CPT

## 2023-05-15 PROCEDURE — 94761 N-INVAS EAR/PLS OXIMETRY MLT: CPT

## 2023-05-15 PROCEDURE — 99285 EMERGENCY DEPT VISIT HI MDM: CPT | Mod: 25

## 2023-05-15 PROCEDURE — 93010 ELECTROCARDIOGRAM REPORT: CPT | Mod: ,,, | Performed by: INTERNAL MEDICINE

## 2023-05-15 PROCEDURE — 86803 HEPATITIS C AB TEST: CPT | Performed by: PHYSICIAN ASSISTANT

## 2023-05-15 PROCEDURE — 93010 EKG 12-LEAD: ICD-10-PCS | Mod: ,,, | Performed by: INTERNAL MEDICINE

## 2023-05-15 PROCEDURE — 93005 ELECTROCARDIOGRAM TRACING: CPT

## 2023-05-15 PROCEDURE — 85025 COMPLETE CBC W/AUTO DIFF WBC: CPT | Performed by: EMERGENCY MEDICINE

## 2023-05-15 PROCEDURE — 99284 EMERGENCY DEPT VISIT MOD MDM: CPT | Mod: ,,, | Performed by: EMERGENCY MEDICINE

## 2023-05-15 PROCEDURE — 99284 PR EMERGENCY DEPT VISIT,LEVEL IV: ICD-10-PCS | Mod: ,,, | Performed by: EMERGENCY MEDICINE

## 2023-05-15 PROCEDURE — 80053 COMPREHEN METABOLIC PANEL: CPT | Performed by: EMERGENCY MEDICINE

## 2023-05-15 PROCEDURE — 87389 HIV-1 AG W/HIV-1&-2 AB AG IA: CPT | Performed by: PHYSICIAN ASSISTANT

## 2023-05-15 PROCEDURE — 84484 ASSAY OF TROPONIN QUANT: CPT | Performed by: EMERGENCY MEDICINE

## 2023-05-15 PROCEDURE — 83880 ASSAY OF NATRIURETIC PEPTIDE: CPT | Performed by: EMERGENCY MEDICINE

## 2023-05-15 NOTE — FIRST PROVIDER EVALUATION
"Medical screening examination initiated.  I have conducted a focused provider triage encounter, findings are as follows:    Brief history of present illness:  42 yo M with PMH of atrial fibrillation and flutter who presents with concern for left chest pain  It is a sharp pain going on for a few weeks and now it is sore, painful to palpation  Yesterday sharp pain was very frequent every one to two hours and at night every hour  Took tylenol, laid down    Vitals:    05/15/23 1652   BP: (!) 141/92   Pulse: 91   Resp: 18   Temp: 98.2 °F (36.8 °C)   TempSrc: Oral   SpO2: 96%   Weight: 108.9 kg (240 lb)   Height: 5' 9" (1.753 m)       Pertinent physical exam:  well appearing, non-toxic    Brief workup plan:  cardiac work up    Preliminary workup initiated; this workup will be continued and followed by the physician or advanced practice provider that is assigned to the patient when roomed.  "

## 2023-05-15 NOTE — ED PROVIDER NOTES
Encounter Date: 5/15/2023       History     Chief Complaint   Patient presents with    Chest Pain     On rib and hurts to press on it     43-year-old male with history of atrial fibrillation status post ablation presents with chest pain.  For the last 2 weeks he has been having episodes of chest pain.  It is a sharp pain underneath his left chest.  It seems worse when he pushes on it or certain movements.  There is no associated shortness of breath.  His symptoms have seemed to have gotten more more frequent over the last 2 weeks.  It seems to be worse at night lately when he is lying in bed.  He denies any chest pain on exertion or dyspnea on exertion.  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  He contacted his cardiologist about the chest pain and the assistant sent him to the ED.  Currently he states he feels normal unless he makes certain movements.    Review of patient's allergies indicates:  No Known Allergies  Past Medical History:   Diagnosis Date    ADHD (attention deficit hyperactivity disorder)     Allergy     Anxiety     Atrial fibrillation     Atrial flutter     Depression     History of psychiatric hospitalization     2002 for depression    Hx of psychiatric care     Psychiatric problem     Supraventricular tachycardia     Therapy      Past Surgical History:   Procedure Laterality Date    ABLATION, ATRIAL FIBRILLATION, CRYO N/A 01/24/2023    Procedure: Ablation, Atrial Fibrillation, Cryo;  Surgeon: Carlos Daugherty MD;  Location: [facility] EP LAB;  Service: Cardiology;  Laterality: N/A;  AF, PVI, CRYO, RAFAEL, CHANEL, GEN, DM, 3prep    APPENDECTOMY      HERNIA REPAIR      TRANSESOPHAGEAL ECHOCARDIOGRAPHY N/A 01/24/2023    Procedure: ECHOCARDIOGRAM, TRANSESOPHAGEAL;  Surgeon: Stacia Howell MD;  Location: [facility] EP LAB;  Service: Cardiology;  Laterality: N/A;     Family History   Problem Relation Age of Onset    Depression Mother     Anxiety disorder Mother      No Known Problems Father     No Known Problems Sister     No Known Problems Brother     No Known Problems Maternal Aunt     No Known Problems Maternal Uncle     No Known Problems Paternal Aunt     No Known Problems Paternal Uncle     No Known Problems Maternal Grandmother     Cancer Maternal Grandfather         Prostate cancer recovered    No Known Problems Paternal Grandmother     No Known Problems Paternal Grandfather     Amblyopia Neg Hx     Blindness Neg Hx     Cataracts Neg Hx     Diabetes Neg Hx     Glaucoma Neg Hx     Hypertension Neg Hx     Macular degeneration Neg Hx     Retinal detachment Neg Hx     Strabismus Neg Hx     Stroke Neg Hx     Thyroid disease Neg Hx      Social History     Tobacco Use    Smoking status: Former     Packs/day: 0.00     Years: 0.00     Pack years: 0.00     Types: Cigarettes     Quit date: 10/22/2004     Years since quittin.5    Smokeless tobacco: Never   Substance Use Topics    Alcohol use: Not Currently     Comment: once a month; socially    Drug use: No     Review of Systems    Physical Exam     Initial Vitals [05/15/23 1652]   BP Pulse Resp Temp SpO2   (!) 141/92 91 18 98.2 °F (36.8 °C) 96 %      MAP       --         Physical Exam    Constitutional: He appears well-developed and well-nourished. No distress.   HENT:   Head: Normocephalic and atraumatic.   Mouth/Throat: Oropharynx is clear and moist.   Eyes: Conjunctivae and EOM are normal. Pupils are equal, round, and reactive to light. Right eye exhibits no discharge. Left eye exhibits no discharge. No scleral icterus.   Neck: Neck supple. No JVD present.   Normal range of motion.  Cardiovascular:  Normal rate, regular rhythm, normal heart sounds and intact distal pulses.     Exam reveals no friction rub.       No murmur heard.  Equal radial pulses   Pulmonary/Chest: Breath sounds normal. No stridor. No respiratory distress. He has no wheezes. He has no rhonchi. He has no rales. He exhibits tenderness (Palpation of the  chest reproduces the pain).   Abdominal: Abdomen is soft. Bowel sounds are normal. He exhibits no distension and no mass. There is no abdominal tenderness. There is no rebound and no guarding.   Musculoskeletal:         General: No tenderness or edema. Normal range of motion.      Cervical back: Normal range of motion and neck supple.      Comments: No sign of DVT     Lymphadenopathy:     He has no cervical adenopathy.   Neurological: He is alert. He has normal strength. No cranial nerve deficit or sensory deficit. GCS score is 15. GCS eye subscore is 4. GCS verbal subscore is 5. GCS motor subscore is 6.   Skin: Skin is warm. Capillary refill takes less than 2 seconds. No rash noted.   Psychiatric: He has a normal mood and affect.       ED Course   Procedures  Labs Reviewed   CBC W/ AUTO DIFFERENTIAL - Abnormal; Notable for the following components:       Result Value    WBC 13.34 (*)     Gran # (ANC) 8.9 (*)     Mono # 1.2 (*)     All other components within normal limits    Narrative:     Release to patient->Immediate   COMPREHENSIVE METABOLIC PANEL - Abnormal; Notable for the following components:    CO2 22 (*)     All other components within normal limits    Narrative:     Release to patient->Immediate   TROPONIN I    Narrative:     Release to patient->Immediate   B-TYPE NATRIURETIC PEPTIDE    Narrative:     Release to patient->Immediate   HIV 1 / 2 ANTIBODY    Narrative:     Release to patient->Immediate   HEPATITIS C ANTIBODY    Narrative:     Release to patient->Immediate   TROPONIN ISTAT   POCT TROPONIN     EKG Readings: (Independently Interpreted)   Normal sinus rhythm at 86 without acute ischemic changes     Imaging Results              X-Ray Chest AP Portable (Final result)  Result time 05/15/23 18:00:10      Final result by Erickson Gonzalez MD (05/15/23 18:00:10)                   Impression:      1. No acute cardiopulmonary process.      Electronically signed by: Erickson Gonzalez  MD  Date:    05/15/2023  Time:    18:00               Narrative:    EXAMINATION:  XR CHEST AP PORTABLE    CLINICAL HISTORY:  chest pain;    TECHNIQUE:  Single frontal view of the chest was performed.    COMPARISON:  02/11/2022    FINDINGS:  The cardiomediastinal silhouette is not enlarged.  There is no pleural effusion.  The trachea is midline.  The lungs are symmetrically expanded bilaterally with coarse interstitial attenuation bilaterally, similar to the previous exam..  No large focal consolidation seen.  There is no pneumothorax.  The osseous structures are remarkable for degenerative changes..                                       Medications - No data to display  Medical Decision Making:   Initial Assessment:   43-year-old male with history of atrial fibrillation on Xarelto presents with chest pain.  By history and physical this seems to be musculoskeletal chest pain.  I think acute coronary syndrome is extremely unlikely.  Will follow the labs ordered in triage.  I do not believe to troponins would be required if the 1st 1 is negative since he has been having these atypical symptoms for the past 2 weeks.  I think pulmonary embolus is extremely unlikely given his vital signs and currently being on Xarelto.  Highly doubt aortic dissection based on history and physical.  There is no abdominal complaints sick suggest cholecystitis or AAA.  Independently Interpreted Test(s):   I have ordered and independently interpreted X-rays - see summary below.       <> Summary of X-Ray Reading(s): Normal heart normal lungs.  No focal infiltrates.  I have ordered and independently interpreted EKG Reading(s) - see prior notes  Clinical Tests:   Lab Tests: Ordered and Reviewed  Radiological Study: Ordered and Reviewed  Medical Tests: Reviewed and Ordered  ED Management:  I reviewed studies.  Troponin was negative.  Has a mildly elevated [place] 72[address].  I reviewed the findings with the patient and wife.  Reviewed for any  infectious symptoms and he is not having fevers chills cough abdominal pain.  States he gets an occasional headache but no headache now.  I do not believe this is a marker of serious bacterial illness.  Will discharge home and recommend follow-up with his PCP.  Recommend taking Tylenol for the pain                        Clinical Impression:   Final diagnoses:  [R07.9] Chest pain  [R07.9] Chest pain, unspecified type (Primary)        ED Disposition Condition    Discharge Stable          ED Prescriptions    None       Follow-up Information       Follow up With Specialties Details Why Contact Info    Ge Naranjo MD Internal Medicine Schedule an appointment as soon as possible for a visit   [id number] [place] [address] [id number] [phone]      [facility] - Emergency Dept Emergency Medicine  If symptoms worsen [address] [facility] [address] [phone]             Nathaniel Uribe MD  05/15/23 1305

## 2023-05-15 NOTE — ED NOTES
Patient states pain to left rib x 2 days, states today with increased frequency, reports [phone] hourly. Takes b/p med at night.

## 2023-05-15 NOTE — ED NOTES
Patient identifiers verified and correct for  Mr Lr  C/C: Left Cp intermittent SEE NN  APPEARANCE: awake and alert in NAD. PAIN  0/10  SKIN: warm, dry and intact. No breakdown or bruising.  MUSCULOSKELETAL: Patient moving all extremities spontaneously, no obvious swelling or deformities noted. Ambulates independently.  RESPIRATORY: Denies shortness of breath.Respirations unlabored.   CARDIAC: Positive left  CP intermttnet every hour, 2+ distal pulses; no peripheral edema  ABDOMEN: S/ND/NT, Denies nausea  : voids spontaneously, denies difficulty  Neurologic: AAO x 4; follows commands equal strength in all extremities; denies numbness/tingling. Denies dizziness Denies new weakness

## 2023-05-15 NOTE — PROVIDER PROGRESS NOTES - EMERGENCY DEPT.
Encounter Date: 5/15/2023    ED Physician Progress Notes         EKG - STEMI Decision  Initial Reading: No STEMI present.

## 2023-05-30 ENCOUNTER — PATIENT MESSAGE (OUTPATIENT)
Dept: ENDOCRINOLOGY | Facility: CLINIC | Age: 43
End: 2023-05-30
Payer: MEDICAID

## 2023-05-30 ENCOUNTER — PATIENT MESSAGE (OUTPATIENT)
Dept: PRIMARY CARE CLINIC | Facility: CLINIC | Age: 43
End: 2023-05-30
Payer: MEDICAID

## 2023-05-30 DIAGNOSIS — F41.1 GAD (GENERALIZED ANXIETY DISORDER): Primary | ICD-10-CM

## 2023-05-30 NOTE — TELEPHONE ENCOUNTER
Referral signed.  We also refer to [facility] and community practices like [facility], [facility], [facility] for individual [facility] family counsellors

## 2023-05-31 DIAGNOSIS — E29.1 HYPOGONADISM IN MALE: Primary | ICD-10-CM

## 2023-05-31 RX ORDER — ALPRAZOLAM 0.5 MG/1
0.5 TABLET ORAL ONCE
Qty: 1 TABLET | Refills: 0 | Status: SHIPPED | OUTPATIENT
Start: 2023-05-31 | End: 2023-07-12

## 2023-06-06 PROCEDURE — 96365 THER/PROPH/DIAG IV INF INIT: CPT | Mod: 59

## 2023-06-07 ENCOUNTER — PATIENT MESSAGE (OUTPATIENT)
Dept: ELECTROPHYSIOLOGY | Facility: CLINIC | Age: 43
End: 2023-06-07
Payer: MEDICAID

## 2023-06-22 ENCOUNTER — PATIENT MESSAGE (OUTPATIENT)
Dept: PRIMARY CARE CLINIC | Facility: CLINIC | Age: 43
End: 2023-06-22
Payer: MEDICAID

## 2023-06-22 DIAGNOSIS — M10.9 GOUT, UNSPECIFIED CAUSE, UNSPECIFIED CHRONICITY, UNSPECIFIED SITE: Primary | ICD-10-CM

## 2023-06-23 ENCOUNTER — PATIENT MESSAGE (OUTPATIENT)
Dept: PRIMARY CARE CLINIC | Facility: CLINIC | Age: 43
End: 2023-06-23
Payer: MEDICAID

## 2023-06-23 DIAGNOSIS — M10.9 GOUT, UNSPECIFIED CAUSE, UNSPECIFIED CHRONICITY, UNSPECIFIED SITE: Primary | ICD-10-CM

## 2023-06-23 RX ORDER — METHYLPREDNISOLONE 4 MG/1
TABLET ORAL
Qty: 21 EACH | Refills: 0 | Status: ON HOLD | OUTPATIENT
Start: 2023-06-23 | End: 2023-07-05

## 2023-06-23 RX ORDER — COLCHICINE 0.6 MG/1
TABLET ORAL
Qty: 30 TABLET | Refills: 0 | Status: SHIPPED | OUTPATIENT
Start: 2023-06-23 | End: 2023-07-12

## 2023-06-25 ENCOUNTER — PATIENT MESSAGE (OUTPATIENT)
Dept: PRIMARY CARE CLINIC | Facility: CLINIC | Age: 43
End: 2023-06-25
Payer: MEDICAID

## 2023-06-27 NOTE — PROGRESS NOTES
Mr. Diop is a patient of Dr. Daugherty and was last seen in clinic 10/21/2022.      Subjective:   Patient ID:  Wilbur Diop Jr. is a 43 y.o. male who presents for follow up of Atrial Fibrillation  .     HPI:    Mr. Diop is a 43 y.o. male with anxiety/depression, AFl (RFA of CTI 3/2022), pAF (cryo PVI 1/24/2023), HTN here for follow up after ablation.    Background:    anxiety, depression; on meds  AFL, s/p RF CTI  PAF, on flecainide    Had racing heart rate for >2 days. Went to ER 2/11/22; diagnosed with SVT but ECG appears c/w typical AFL with 2:1 conduction. Reportedly CSM had no effect, and arrhythmia terminated following 12 mg adenosine. No ECG of termination.  Felt well since.    Went to ER recently. Again c/w typical AFL. Rx or AFL with RVR was diltiazem; repeat ECG showed NSR.  Update 10/2022:  RF of AFL 3/22. During that case, AFL degenerated to AF at times. SVT was not inducible with up to DAES.  Subsequently, had PAF requiring hospital visit and then monitoring showed 4% AF. Started flecainide.  Since flecainide, doing well.    ECG is NSR  Doing well on flecainide for past weeks.   Discussed options: AAD vs. PVI, with pros/cons of each approach. If opts for PVI, would likely use cryoballoon.    Update (06/29/2023):    1/24/2023: cryo PVI    4/2023: BPs up - verapamil increased    Today he says he has not felt any AF symptoms since procedure (typical AF symptoms are palpitations.) Mr. Diop reports no chest pain with exertion or at rest, SOB, SANTA, dizziness, or syncope.  BPs much better on lisinopril.     He is off xarelto since last week. He is currently being treated with flecainide 100mg BID for rhythm control and verapamil 120mg daily for HR control. Kidney function is stable, with a creatinine of 0.9 on 5/15/2023.    I have personally reviewed the patient's EKG today, which shows sinus rhythm at 89bpm. FL interval is 154. QRS is 94. QTc is 440.    Relevant Cardiac Test Results:    RAFAEL  "(1/24/2023):  RAFAEL prior to ablation  Normal appearing left atrial appendage. No thrombus is present in the appendage. Normal appendage velocities.  The estimated ejection fraction is 65%.  The left ventricle is normal in size with normal systolic function.  Normal left ventricular diastolic function.  Normal right ventricular size with normal right ventricular systolic function.  Normal central venous pressure (3 mmHg).  Mild mitral regurgitation.  No plaque present.    Current Outpatient Medications   Medication Sig    cetirizine (ZYRTEC) 5 MG tablet Take 5 mg by mouth 2 (two) times daily as needed.    dexmethylphenidate (FOCALIN XR) 5 MG 24 hr capsule Take 5 mg by mouth every morning.    flecainide (TAMBOCOR) 100 MG Tab Take 1 tablet (100 mg total) by mouth every 12 (twelve) hours.    lamoTRIgine (LAMICTAL) 25 MG tablet TAKE 1 TABLET BY MOUTH DAILY FOR 14 DAYS, THEN INCREASE TO 2 TABLETS DAILY.    lisinopriL (PRINIVIL,ZESTRIL) 20 MG tablet Take 1 tablet (20 mg total) by mouth once daily. (Patient taking differently: Take 20 mg by mouth every evening.)    LORazepam (ATIVAN) 0.5 MG tablet Take 0.5 mg by mouth 2 (two) times daily as needed.    needle, disp, 18 G 18 gauge x 1" Ndle For T injection    needle, disp, 21 G 21 gauge x 1 1/2" Ndle For T injection    rivaroxaban (XARELTO) 20 mg Tab Take 1 tablet (20 mg total) by mouth daily with dinner or evening meal.    SPRAVATO 84 mg (28 mg x 3) Spry Take 56 mg    syringe, disposable, (BD LUER-GILL SYRINGE) 3 mL Syrg For T injection    testosterone cypionate (DEPOTESTOTERONE CYPIONATE) 200 mg/mL injection Inject 1 mL (200 mg total) into the muscle every 14 (fourteen) days.    verapamiL (CALAN-SR) 240 MG CR tablet Take 1 tablet (240 mg total) by mouth every evening. (Patient taking differently: Take 120 mg by mouth every evening.)    ALPRAZolam (XANAX) 0.5 MG tablet Take 1 tablet (0.5 mg total) by mouth once. Prior to MRI for 1 dose (Patient not taking: Reported on " "6/29/2023)    colchicine (COLCRYS) 0.6 mg tablet Take 2 tablets by mouth once, then take 1 tablet by mouth 1 hour later.  Then take 1 tablet by mouth daily until symptoms resolve (Patient not taking: Reported on 6/29/2023)    ergocalciferol, vitamin D2, (VITAMIN D ORAL) Take by mouth.    EScitalopram oxalate (LEXAPRO) 10 MG tablet Take 10 mg by mouth once daily.    MAGNESIUM ORAL Take by mouth.    methylPREDNISolone (MEDROL DOSEPACK) 4 mg tablet use as directed (Patient not taking: Reported on 6/29/2023)    zinc 50 mg Tab Take by mouth.     No current facility-administered medications for this visit.       Review of Systems   Constitutional: Negative for malaise/fatigue.   Cardiovascular:  Negative for chest pain, dyspnea on exertion, irregular heartbeat, leg swelling and palpitations.   Respiratory:  Negative for shortness of breath.    Hematologic/Lymphatic: Negative for bleeding problem.   Skin:  Negative for rash.   Musculoskeletal:  Negative for myalgias.   Gastrointestinal:  Negative for hematemesis, hematochezia and nausea.   Genitourinary:  Negative for hematuria.   Neurological:  Negative for light-headedness.   Psychiatric/Behavioral:  Negative for altered mental status.    Allergic/Immunologic: Negative for persistent infections.     Objective:          /76   Pulse 89   Ht 5' 9" (1.753 m)   Wt 110.2 kg (242 lb 15.2 oz)   BMI 35.88 kg/m²     Physical Exam  Vitals and nursing note reviewed.   Constitutional:       Appearance: Normal appearance. He is well-developed.   HENT:      Head: Normocephalic.      Nose: Nose normal.   Eyes:      Pupils: Pupils are equal, round, and reactive to light.   Cardiovascular:      Rate and Rhythm: Normal rate and regular rhythm.   Pulmonary:      Effort: No respiratory distress.      Breath sounds: Normal breath sounds.   Musculoskeletal:         General: Normal range of motion.   Skin:     General: Skin is warm and dry.      Findings: No erythema.   Neurological: "      Mental Status: He is alert and oriented to person, place, and time.   Psychiatric:         Speech: Speech normal.         Behavior: Behavior normal.     Lab Results   Component Value Date     05/15/2023    K 3.9 05/15/2023    MG 2.2 09/27/2022    BUN 14 05/15/2023    CREATININE 0.9 05/15/2023    ALT 30 05/15/2023    AST 19 05/15/2023    HGB 14.8 05/15/2023    HCT 44.9 05/15/2023    TSH 1.154 11/17/2022    LDLCALC 177.2 (H) 11/17/2022     Recent Labs   Lab 03/07/22  1413 01/17/23  0905   INR 1.0 1.1       Assessment:     1. Paroxysmal atrial fibrillation    2. PSVT (paroxysmal supraventricular tachycardia)    3. Typical atrial flutter    4. Status post catheter ablation of atrial flutter    5. Snoring      Plan:     In summary, Mr. Diop is a 43 y.o. male with anxiety/depression, AFl (RFA of CTI 3/2022), pAF (cryo PVI 1/24/2023)here for follow up after ablation.  He is now 5 months s/p cryo PVI. He is doing well from a rhythm standpoint, with no documented or symptomatic recurrence of arrhythmia since procedure.  Off xarelto. CHADSVASc 1 (HTN). Will start ASA for now. Can stop flecainide at this time. He snores - will refer for sleep study.    Stop flecainide  Already off xarelto - on ASA  Continue other meds  RTC 4 mo, sooner if needed    *A copy of this note has been sent to Dr. Daugherty*    Follow up in about 4 months (around 10/29/2023).    ------------------------------------------------------------------    ROSALINO Mo, NP-C  Cardiac Electrophysiology

## 2023-06-28 ENCOUNTER — TELEPHONE (OUTPATIENT)
Dept: ELECTROPHYSIOLOGY | Facility: CLINIC | Age: 43
End: 2023-06-28
Payer: MEDICAID

## 2023-06-29 ENCOUNTER — OFFICE VISIT (OUTPATIENT)
Dept: ELECTROPHYSIOLOGY | Facility: CLINIC | Age: 43
End: 2023-06-29
Payer: MEDICAID

## 2023-06-29 ENCOUNTER — HOSPITAL ENCOUNTER (OUTPATIENT)
Dept: CARDIOLOGY | Facility: CLINIC | Age: 43
Discharge: HOME | End: 2023-06-29
Payer: MEDICAID

## 2023-06-29 VITALS
BODY MASS INDEX: 35.98 KG/M2 | HEART RATE: 89 BPM | HEIGHT: 69 IN | SYSTOLIC BLOOD PRESSURE: 118 MMHG | WEIGHT: 242.94 LBS | DIASTOLIC BLOOD PRESSURE: 76 MMHG

## 2023-06-29 DIAGNOSIS — I48.3 TYPICAL ATRIAL FLUTTER: ICD-10-CM

## 2023-06-29 DIAGNOSIS — R06.83 SNORING: ICD-10-CM

## 2023-06-29 DIAGNOSIS — I48.0 PAROXYSMAL ATRIAL FIBRILLATION: Primary | ICD-10-CM

## 2023-06-29 DIAGNOSIS — I47.10 PSVT (PAROXYSMAL SUPRAVENTRICULAR TACHYCARDIA): ICD-10-CM

## 2023-06-29 DIAGNOSIS — I47.10 SVT (SUPRAVENTRICULAR TACHYCARDIA): ICD-10-CM

## 2023-06-29 DIAGNOSIS — Z98.890 STATUS POST CATHETER ABLATION OF ATRIAL FLUTTER: ICD-10-CM

## 2023-06-29 PROCEDURE — 99215 OFFICE O/P EST HI 40 MIN: CPT | Mod: PBBFAC | Performed by: NURSE PRACTITIONER

## 2023-06-29 PROCEDURE — 93005 ELECTROCARDIOGRAM TRACING: CPT | Mod: PBBFAC | Performed by: INTERNAL MEDICINE

## 2023-06-29 PROCEDURE — 1159F PR MEDICATION LIST DOCUMENTED IN MEDICAL RECORD: ICD-10-PCS | Mod: CPTII,,, | Performed by: NURSE PRACTITIONER

## 2023-06-29 PROCEDURE — 1159F MED LIST DOCD IN RCRD: CPT | Mod: CPTII,,, | Performed by: NURSE PRACTITIONER

## 2023-06-29 PROCEDURE — 1160F RVW MEDS BY RX/DR IN RCRD: CPT | Mod: CPTII,,, | Performed by: NURSE PRACTITIONER

## 2023-06-29 PROCEDURE — 3008F BODY MASS INDEX DOCD: CPT | Mod: CPTII,,, | Performed by: NURSE PRACTITIONER

## 2023-06-29 PROCEDURE — 3074F SYST BP LT 130 MM HG: CPT | Mod: CPTII,,, | Performed by: NURSE PRACTITIONER

## 2023-06-29 PROCEDURE — 93010 ELECTROCARDIOGRAM REPORT: CPT | Mod: S$PBB,,, | Performed by: INTERNAL MEDICINE

## 2023-06-29 PROCEDURE — 99999 PR PBB SHADOW E&M-EST. PATIENT-LVL V: ICD-10-PCS | Mod: PBBFAC,,, | Performed by: NURSE PRACTITIONER

## 2023-06-29 PROCEDURE — 4010F PR ACE/ARB THEARPY RXD/TAKEN: ICD-10-PCS | Mod: CPTII,,, | Performed by: NURSE PRACTITIONER

## 2023-06-29 PROCEDURE — 3074F PR MOST RECENT SYSTOLIC BLOOD PRESSURE < 130 MM HG: ICD-10-PCS | Mod: CPTII,,, | Performed by: NURSE PRACTITIONER

## 2023-06-29 PROCEDURE — 3078F PR MOST RECENT DIASTOLIC BLOOD PRESSURE < 80 MM HG: ICD-10-PCS | Mod: CPTII,,, | Performed by: NURSE PRACTITIONER

## 2023-06-29 PROCEDURE — 3078F DIAST BP <80 MM HG: CPT | Mod: CPTII,,, | Performed by: NURSE PRACTITIONER

## 2023-06-29 PROCEDURE — 3008F PR BODY MASS INDEX (BMI) DOCUMENTED: ICD-10-PCS | Mod: CPTII,,, | Performed by: NURSE PRACTITIONER

## 2023-06-29 PROCEDURE — 4010F ACE/ARB THERAPY RXD/TAKEN: CPT | Mod: CPTII,,, | Performed by: NURSE PRACTITIONER

## 2023-06-29 PROCEDURE — 99214 OFFICE O/P EST MOD 30 MIN: CPT | Mod: S$PBB,,, | Performed by: NURSE PRACTITIONER

## 2023-06-29 PROCEDURE — 99999 PR PBB SHADOW E&M-EST. PATIENT-LVL V: CPT | Mod: PBBFAC,,, | Performed by: NURSE PRACTITIONER

## 2023-06-29 PROCEDURE — 1160F PR REVIEW ALL MEDS BY PRESCRIBER/CLIN PHARMACIST DOCUMENTED: ICD-10-PCS | Mod: CPTII,,, | Performed by: NURSE PRACTITIONER

## 2023-06-29 PROCEDURE — 99214 PR OFFICE/OUTPT VISIT, EST, LEVL IV, 30-39 MIN: ICD-10-PCS | Mod: S$PBB,,, | Performed by: NURSE PRACTITIONER

## 2023-06-29 PROCEDURE — 93010 RHYTHM STRIP: ICD-10-PCS | Mod: S$PBB,,, | Performed by: INTERNAL MEDICINE

## 2023-06-29 RX ORDER — ASPIRIN 81 MG/1
81 TABLET ORAL DAILY
Status: ON HOLD | COMMUNITY
End: 2023-07-06 | Stop reason: HOSPADM

## 2023-06-29 RX ORDER — VERAPAMIL HYDROCHLORIDE 120 MG/1
120 TABLET, FILM COATED, EXTENDED RELEASE ORAL NIGHTLY
Qty: 30 TABLET | Refills: 11
Start: 2023-06-29 | End: 2023-09-22 | Stop reason: SDUPTHER

## 2023-06-29 RX ORDER — LAMOTRIGINE 25 MG/1
50 TABLET ORAL DAILY
COMMUNITY
Start: 2023-06-13 | End: 2023-12-04

## 2023-07-04 ENCOUNTER — HOSPITAL ENCOUNTER (OUTPATIENT)
Facility: HOSPITAL | Age: 43
Discharge: HOME | End: 2023-07-06
Attending: EMERGENCY MEDICINE | Admitting: INTERNAL MEDICINE
Payer: MEDICAID

## 2023-07-04 ENCOUNTER — PATIENT MESSAGE (OUTPATIENT)
Dept: ELECTROPHYSIOLOGY | Facility: CLINIC | Age: 43
End: 2023-07-04
Payer: MEDICAID

## 2023-07-04 DIAGNOSIS — M79.661 PAIN AND SWELLING OF LOWER LEG, RIGHT: ICD-10-CM

## 2023-07-04 DIAGNOSIS — I26.99 PULMONARY EMBOLISM: ICD-10-CM

## 2023-07-04 DIAGNOSIS — R07.9 CHEST PAIN: ICD-10-CM

## 2023-07-04 DIAGNOSIS — R06.02 SHORTNESS OF BREATH: ICD-10-CM

## 2023-07-04 DIAGNOSIS — I26.99 PULMONARY EMBOLISM, UNSPECIFIED CHRONICITY, UNSPECIFIED PULMONARY EMBOLISM TYPE, UNSPECIFIED WHETHER ACUTE COR PULMONALE PRESENT: Primary | ICD-10-CM

## 2023-07-04 DIAGNOSIS — I26.99 BILATERAL PULMONARY EMBOLISM: ICD-10-CM

## 2023-07-04 DIAGNOSIS — M79.89 PAIN AND SWELLING OF LOWER LEG, RIGHT: ICD-10-CM

## 2023-07-04 LAB
ALBUMIN SERPL BCP-MCNC: 3.8 G/DL (ref 3.5–5.2)
ALP SERPL-CCNC: 72 U/L (ref 55–135)
ALT SERPL W/O P-5'-P-CCNC: 31 U/L (ref 10–44)
ANION GAP SERPL CALC-SCNC: 11 MMOL/L (ref 8–16)
APTT PPP: 24 SEC (ref 21–32)
AST SERPL-CCNC: 20 U/L (ref 10–40)
BASOPHILS # BLD AUTO: 0.03 K/UL (ref 0–0.2)
BASOPHILS # BLD AUTO: 0.03 K/UL (ref 0–0.2)
BASOPHILS NFR BLD: 0.3 % (ref 0–1.9)
BASOPHILS NFR BLD: 0.3 % (ref 0–1.9)
BILIRUB SERPL-MCNC: 0.4 MG/DL (ref 0.1–1)
BNP SERPL-MCNC: <10 PG/ML (ref 0–99)
BUN SERPL-MCNC: 16 MG/DL (ref 6–20)
CALCIUM SERPL-MCNC: 9.2 MG/DL (ref 8.7–10.5)
CHLORIDE SERPL-SCNC: 101 MMOL/L (ref 95–110)
CO2 SERPL-SCNC: 25 MMOL/L (ref 23–29)
CREAT SERPL-MCNC: 0.9 MG/DL (ref 0.5–1.4)
D DIMER PPP IA.FEU-MCNC: 1.25 MG/L FEU
DIFFERENTIAL METHOD: ABNORMAL
DIFFERENTIAL METHOD: ABNORMAL
EOSINOPHIL # BLD AUTO: 0.2 K/UL (ref 0–0.5)
EOSINOPHIL # BLD AUTO: 0.2 K/UL (ref 0–0.5)
EOSINOPHIL NFR BLD: 1.5 % (ref 0–8)
EOSINOPHIL NFR BLD: 1.8 % (ref 0–8)
ERYTHROCYTE [DISTWIDTH] IN BLOOD BY AUTOMATED COUNT: 12.7 % (ref 11.5–14.5)
ERYTHROCYTE [DISTWIDTH] IN BLOOD BY AUTOMATED COUNT: 12.7 % (ref 11.5–14.5)
EST. GFR  (NO RACE VARIABLE): >60 ML/MIN/1.73 M^2
GLUCOSE SERPL-MCNC: 107 MG/DL (ref 70–110)
HCT VFR BLD AUTO: 43.8 % (ref 40–54)
HCT VFR BLD AUTO: 44.5 % (ref 40–54)
HGB BLD-MCNC: 15 G/DL (ref 14–18)
HGB BLD-MCNC: 15.2 G/DL (ref 14–18)
IMM GRANULOCYTES # BLD AUTO: 0.04 K/UL (ref 0–0.04)
IMM GRANULOCYTES # BLD AUTO: 0.04 K/UL (ref 0–0.04)
IMM GRANULOCYTES NFR BLD AUTO: 0.3 % (ref 0–0.5)
IMM GRANULOCYTES NFR BLD AUTO: 0.4 % (ref 0–0.5)
INR PPP: 1 (ref 0.8–1.2)
LYMPHOCYTES # BLD AUTO: 2.5 K/UL (ref 1–4.8)
LYMPHOCYTES # BLD AUTO: 3 K/UL (ref 1–4.8)
LYMPHOCYTES NFR BLD: 22.8 % (ref 18–48)
LYMPHOCYTES NFR BLD: 25.4 % (ref 18–48)
MCH RBC QN AUTO: 29.7 PG (ref 27–31)
MCH RBC QN AUTO: 29.9 PG (ref 27–31)
MCHC RBC AUTO-ENTMCNC: 34.2 G/DL (ref 32–36)
MCHC RBC AUTO-ENTMCNC: 34.2 G/DL (ref 32–36)
MCV RBC AUTO: 87 FL (ref 82–98)
MCV RBC AUTO: 87 FL (ref 82–98)
MONOCYTES # BLD AUTO: 1.3 K/UL (ref 0.3–1)
MONOCYTES # BLD AUTO: 1.5 K/UL (ref 0.3–1)
MONOCYTES NFR BLD: 11.9 % (ref 4–15)
MONOCYTES NFR BLD: 12.6 % (ref 4–15)
NEUTROPHILS # BLD AUTO: 6.9 K/UL (ref 1.8–7.7)
NEUTROPHILS # BLD AUTO: 7.1 K/UL (ref 1.8–7.7)
NEUTROPHILS NFR BLD: 59.6 % (ref 38–73)
NEUTROPHILS NFR BLD: 63.1 % (ref 38–73)
NRBC BLD-RTO: 0 /100 WBC
NRBC BLD-RTO: 0 /100 WBC
PLATELET # BLD AUTO: 273 K/UL (ref 150–450)
PLATELET # BLD AUTO: 278 K/UL (ref 150–450)
PMV BLD AUTO: 9.5 FL (ref 9.2–12.9)
PMV BLD AUTO: 9.8 FL (ref 9.2–12.9)
POTASSIUM SERPL-SCNC: 4.3 MMOL/L (ref 3.5–5.1)
PROT SERPL-MCNC: 7.3 G/DL (ref 6–8.4)
PROTHROMBIN TIME: 10.3 SEC (ref 9–12.5)
RBC # BLD AUTO: 5.02 M/UL (ref 4.6–6.2)
RBC # BLD AUTO: 5.11 M/UL (ref 4.6–6.2)
SODIUM SERPL-SCNC: 137 MMOL/L (ref 136–145)
WBC # BLD AUTO: 11 K/UL (ref 3.9–12.7)
WBC # BLD AUTO: 11.82 K/UL (ref 3.9–12.7)

## 2023-07-04 PROCEDURE — 63600175 PHARM REV CODE 636 W HCPCS: Performed by: PHYSICIAN ASSISTANT

## 2023-07-04 PROCEDURE — 85610 PROTHROMBIN TIME: CPT | Performed by: PHYSICIAN ASSISTANT

## 2023-07-04 PROCEDURE — 85379 FIBRIN DEGRADATION QUANT: CPT | Performed by: PHYSICIAN ASSISTANT

## 2023-07-04 PROCEDURE — 96365 THER/PROPH/DIAG IV INF INIT: CPT

## 2023-07-04 PROCEDURE — 25000003 PHARM REV CODE 250: Performed by: PHYSICIAN ASSISTANT

## 2023-07-04 PROCEDURE — 93010 EKG 12-LEAD: ICD-10-PCS | Mod: ,,, | Performed by: INTERNAL MEDICINE

## 2023-07-04 PROCEDURE — 85025 COMPLETE CBC W/AUTO DIFF WBC: CPT | Mod: 91 | Performed by: PHYSICIAN ASSISTANT

## 2023-07-04 PROCEDURE — G0378 HOSPITAL OBSERVATION PER HR: HCPCS

## 2023-07-04 PROCEDURE — 25500020 PHARM REV CODE 255: Performed by: EMERGENCY MEDICINE

## 2023-07-04 PROCEDURE — 80053 COMPREHEN METABOLIC PANEL: CPT | Performed by: PHYSICIAN ASSISTANT

## 2023-07-04 PROCEDURE — 96366 THER/PROPH/DIAG IV INF ADDON: CPT

## 2023-07-04 PROCEDURE — 83880 ASSAY OF NATRIURETIC PEPTIDE: CPT | Performed by: PHYSICIAN ASSISTANT

## 2023-07-04 PROCEDURE — 99223 1ST HOSP IP/OBS HIGH 75: CPT | Mod: ,,, | Performed by: PHYSICIAN ASSISTANT

## 2023-07-04 PROCEDURE — 99285 EMERGENCY DEPT VISIT HI MDM: CPT | Mod: 25

## 2023-07-04 PROCEDURE — 96375 TX/PRO/DX INJ NEW DRUG ADDON: CPT | Mod: 59

## 2023-07-04 PROCEDURE — 99223 PR INITIAL HOSPITAL CARE,LEVL III: ICD-10-PCS | Mod: ,,, | Performed by: PHYSICIAN ASSISTANT

## 2023-07-04 PROCEDURE — 93010 ELECTROCARDIOGRAM REPORT: CPT | Mod: ,,, | Performed by: INTERNAL MEDICINE

## 2023-07-04 PROCEDURE — 85730 THROMBOPLASTIN TIME PARTIAL: CPT | Performed by: PHYSICIAN ASSISTANT

## 2023-07-04 PROCEDURE — 93005 ELECTROCARDIOGRAM TRACING: CPT

## 2023-07-04 RX ORDER — TALC
6 POWDER (GRAM) TOPICAL NIGHTLY PRN
Status: DISCONTINUED | OUTPATIENT
Start: 2023-07-04 | End: 2023-07-06 | Stop reason: HOSPADM

## 2023-07-04 RX ORDER — ONDANSETRON 8 MG/1
8 TABLET, ORALLY DISINTEGRATING ORAL EVERY 8 HOURS PRN
Status: DISCONTINUED | OUTPATIENT
Start: 2023-07-04 | End: 2023-07-06 | Stop reason: HOSPADM

## 2023-07-04 RX ORDER — ACETAMINOPHEN 325 MG/1
650 TABLET ORAL EVERY 4 HOURS PRN
Status: DISCONTINUED | OUTPATIENT
Start: 2023-07-04 | End: 2023-07-06 | Stop reason: HOSPADM

## 2023-07-04 RX ORDER — ASPIRIN 81 MG/1
81 TABLET ORAL DAILY
Status: DISCONTINUED | OUTPATIENT
Start: 2023-07-05 | End: 2023-07-06 | Stop reason: HOSPADM

## 2023-07-04 RX ORDER — IBUPROFEN 200 MG
16 TABLET ORAL
Status: DISCONTINUED | OUTPATIENT
Start: 2023-07-04 | End: 2023-07-06 | Stop reason: HOSPADM

## 2023-07-04 RX ORDER — VERAPAMIL HYDROCHLORIDE 120 MG/1
120 TABLET, FILM COATED, EXTENDED RELEASE ORAL NIGHTLY
Status: DISCONTINUED | OUTPATIENT
Start: 2023-07-05 | End: 2023-07-06 | Stop reason: HOSPADM

## 2023-07-04 RX ORDER — POLYETHYLENE GLYCOL 3350 17 G/17G
17 POWDER, FOR SOLUTION ORAL DAILY PRN
Status: DISCONTINUED | OUTPATIENT
Start: 2023-07-04 | End: 2023-07-06 | Stop reason: HOSPADM

## 2023-07-04 RX ORDER — GLUCAGON 1 MG
1 KIT INJECTION
Status: DISCONTINUED | OUTPATIENT
Start: 2023-07-04 | End: 2023-07-06 | Stop reason: HOSPADM

## 2023-07-04 RX ORDER — LAMOTRIGINE 25 MG/1
50 TABLET ORAL 2 TIMES DAILY
Status: DISCONTINUED | OUTPATIENT
Start: 2023-07-04 | End: 2023-07-05

## 2023-07-04 RX ORDER — IBUPROFEN 200 MG
24 TABLET ORAL
Status: DISCONTINUED | OUTPATIENT
Start: 2023-07-04 | End: 2023-07-06 | Stop reason: HOSPADM

## 2023-07-04 RX ORDER — BISACODYL 10 MG
10 SUPPOSITORY, RECTAL RECTAL DAILY PRN
Status: DISCONTINUED | OUTPATIENT
Start: 2023-07-04 | End: 2023-07-06 | Stop reason: HOSPADM

## 2023-07-04 RX ORDER — PROMETHAZINE HYDROCHLORIDE 25 MG/1
25 TABLET ORAL EVERY 6 HOURS PRN
Status: DISCONTINUED | OUTPATIENT
Start: 2023-07-04 | End: 2023-07-06 | Stop reason: HOSPADM

## 2023-07-04 RX ORDER — LORAZEPAM 0.5 MG/1
0.5 TABLET ORAL 2 TIMES DAILY PRN
Status: DISCONTINUED | OUTPATIENT
Start: 2023-07-04 | End: 2023-07-06 | Stop reason: HOSPADM

## 2023-07-04 RX ORDER — HEPARIN SODIUM,PORCINE/D5W 25000/250
0-40 INTRAVENOUS SOLUTION INTRAVENOUS CONTINUOUS
Status: DISCONTINUED | OUTPATIENT
Start: 2023-07-04 | End: 2023-07-06 | Stop reason: HOSPADM

## 2023-07-04 RX ADMIN — LORAZEPAM 0.5 MG: 0.5 TABLET ORAL at 11:07

## 2023-07-04 RX ADMIN — IOHEXOL 75 ML: 350 INJECTION, SOLUTION INTRAVENOUS at 07:07

## 2023-07-04 RX ADMIN — HEPARIN SODIUM 18 UNITS/KG/HR: 10000 INJECTION, SOLUTION INTRAVENOUS at 10:07

## 2023-07-04 NOTE — ED PROVIDER NOTES
Encounter Date: 7/4/2023       History     Chief Complaint   Patient presents with    Leg Pain     Stopped xeralto, and put asa on thurs , pain to r calf     43-year-old male with pmhx of atrial fibrillation (s/p ablation), anxiety/depression presents to ED with right calf pain and swelling x4 days.  Symptoms started after riding in a car for approximately 14 hours.  He also notes shortness of breath and significant cough for the past 3 weeks. Says that he experiences shortness of breath mainly with exertion.  He has a history of AFib and was previously on anticoagulation.  He had ablation in January 2023. He stopped Xarelto and flecainide 1-2 weeks ago as advised by his electrophysiologist and is currently only taking aspirin. He denies chest pain, palpitations.    The history is provided by the patient.   Review of patient's allergies indicates:  No Known Allergies  Past Medical History:   Diagnosis Date    ADHD (attention deficit hyperactivity disorder)     Allergy     Anxiety     Atrial fibrillation     Atrial flutter     Depression     History of psychiatric hospitalization     2002 for depression    Hx of psychiatric care     Psychiatric problem     Supraventricular tachycardia     Therapy      Past Surgical History:   Procedure Laterality Date    ABLATION, ATRIAL FIBRILLATION, CRYO N/A 01/24/2023    Procedure: Ablation, Atrial Fibrillation, Cryo;  Surgeon: Carlos Daugherty MD;  Location: [facility] EP LAB;  Service: Cardiology;  Laterality: N/A;  AF, PVI, CRYO, RAFAEL, CHANEL, GEN, DM, 3prep    APPENDECTOMY      HERNIA REPAIR      TRANSESOPHAGEAL ECHOCARDIOGRAPHY N/A 01/24/2023    Procedure: ECHOCARDIOGRAM, TRANSESOPHAGEAL;  Surgeon: Stacia Howell MD;  Location: [facility] EP LAB;  Service: Cardiology;  Laterality: N/A;     Family History   Problem Relation Age of Onset    Depression Mother     Anxiety disorder Mother     No Known Problems Father     No Known Problems Sister     No Known Problems Brother     No Known  Problems Maternal Aunt     No Known Problems Maternal Uncle     No Known Problems Paternal Aunt     No Known Problems Paternal Uncle     No Known Problems Maternal Grandmother     Cancer Maternal Grandfather         Prostate cancer recovered    No Known Problems Paternal Grandmother     No Known Problems Paternal Grandfather     Amblyopia Neg Hx     Blindness Neg Hx     Cataracts Neg Hx     Diabetes Neg Hx     Glaucoma Neg Hx     Hypertension Neg Hx     Macular degeneration Neg Hx     Retinal detachment Neg Hx     Strabismus Neg Hx     Stroke Neg Hx     Thyroid disease Neg Hx      Social History     Tobacco Use    Smoking status: Former     Packs/day: 0.00     Years: 0.00     Pack years: 0.00     Types: Cigarettes     Quit date: 10/22/2004     Years since quittin.7    Smokeless tobacco: Never   Substance Use Topics    Alcohol use: Not Currently     Comment: once a month; socially    Drug use: No     Review of Systems   Constitutional:  Negative for fever.   HENT:  Negative for sore throat.    Respiratory:  Positive for cough and shortness of breath.    Cardiovascular:  Positive for leg swelling. Negative for chest pain and palpitations.   Gastrointestinal:  Negative for nausea.   Genitourinary:  Negative for dysuria.   Musculoskeletal:  Negative for back pain.   Skin:  Negative for rash.   Neurological:  Negative for weakness.   Hematological:  Does not bruise/bleed easily.     Physical Exam     Initial Vitals [23 1613]   BP Pulse Resp Temp SpO2   133/81 92 16 98.6 °F (37 °C) 97 %      MAP       --         Physical Exam    Nursing note and vitals reviewed.  Constitutional: He appears well-developed and well-nourished. He is not diaphoretic. No distress.   HENT:   Head: Normocephalic and atraumatic.   Nose: Nose normal.   Eyes: Conjunctivae and EOM are normal.   Neck: Neck supple.   Cardiovascular:  Normal rate, regular rhythm, normal heart sounds and intact distal pulses.           Pulmonary/Chest: Breath  sounds normal. No respiratory distress.   Musculoskeletal:      Cervical back: Neck supple.      Comments: Mild Right calf swelling.  TTP.  No erythema.  Compartments are soft.  Skin temperature is equal when compared to left     Neurological: He is alert and oriented to person, place, and time.   Skin: Skin is warm. No rash noted. No erythema.   Psychiatric: He has a normal mood and affect. Thought content normal.       ED Course   Procedures  Labs Reviewed   CBC W/ AUTO DIFFERENTIAL - Abnormal; Notable for the following components:       Result Value    Mono # 1.3 (*)     All other components within normal limits   D DIMER, QUANTITATIVE - Abnormal; Notable for the following components:    D-Dimer 1.25 (*)     All other components within normal limits   CBC W/ AUTO DIFFERENTIAL - Abnormal; Notable for the following components:    Mono # 1.5 (*)     All other components within normal limits    Narrative:     Draw baseline aPTT prior to starting the heparin bolus or  infusion  (if patient is on warfarin prior to heparin therapy)   COMPREHENSIVE METABOLIC PANEL   B-TYPE NATRIURETIC PEPTIDE   APTT    Narrative:     Draw baseline aPTT prior to starting the heparin bolus or  infusion  (if patient is on warfarin prior to heparin therapy)   PROTIME-INR    Narrative:     Draw baseline aPTT prior to starting the heparin bolus or  infusion  (if patient is on warfarin prior to heparin therapy)     EKG Readings: (Independently Interpreted)   Rhythm: Normal Sinus Rhythm. Heart Rate: 78. Axis: Normal.     Imaging Results               CTA Chest Non-Coronary (PE Studies) (Final result)  Result time 07/04/23 20:20:56      Final result by Wilbur Tolbert MD (07/04/23 20:20:56)                   Impression:      Right greater than left bilateral pulmonary emboli, as detailed above, with the most central involving a segmental branch of the right pulmonary artery.    Hepatic steatosis.    The critical information above was relayed  directly by Annabella Matthew by telephone to Irene Ladd MD on 7/4/2023 at 19:49.    This report was flagged in Epic as abnormal.    Electronically signed by resident: Annabella Matthew  Date:    07/04/2023  Time:    19:28    Electronically signed by: Wilbur Tolbert MD  Date:    07/04/2023  Time:    20:20               Narrative:    EXAMINATION:  CTA CHEST NON CORONARY (PE STUDIES)    CLINICAL HISTORY:  Pulmonary embolism (PE) suspected, positive D-dimer;    TECHNIQUE:  Low dose axial images, sagittal and coronal reformations were obtained from the thoracic inlet to the lung bases.  75 cc Omnipaque 350 intravenous contrast administered according to PE protocol..    COMPARISON:  CTA 01/17/2023    FINDINGS:  Base of Neck: No significant abnormality.Thyroid appears within normal limits.    Thoracic soft tissues: Unremarkable.    Claudette/Mediastinum: No pathologic henry enlargement.    Heart: Normal size. No pericardial effusion.    Pulmonary vasculature: Right greater than left bilateral pulmonary emboli.  No large central type pulmonary embolus.  The most centrally located embolus on the right is noted in a segmental branch (2-217), otherwise, there are multiple pulmonary emboli visualized through the subsegmental branches of the right pulmonary artery (for example 2-317, 2-268, 2-248),.  Small subsegmental embolus on the left (2-288).  Small focus of air noted in the main pulmonary artery, likely related to injection of contrast.    Aorta: Left-sided aortic arch with 3 branch vessels.  No aneurysm, dissection, and no significant atherosclerosis.    Trachea and Proximal airways: Patent.    Lungs/Pleura: Respiratory motion artifact at the lung bases.  Symmetrically expanded without consolidation or pneumothorax.  No pleural effusion..  Small calcified granuloma in the medial left upper lobe, unchanged from prior.    Esophagus: Normal course and caliber.    Upper Abdomen: Hepatic steatosis.    Bones: No acute fracture. No  suspicious lytic or sclerotic lesions.  Age-appropriate degenerative changes.                                       US Lower Extremity Veins Right (Final result)  Result time 07/04/23 18:14:09      Final result by Boston Ibrahim MD (07/04/23 18:14:09)                   Impression:      No evidence of deep venous thrombosis in the right lower extremity.    Electronically signed by resident: Cecelia Longoria  Date:    07/04/2023  Time:    18:02    Electronically signed by: Boston Ibrahim MD  Date:    07/04/2023  Time:    18:14               Narrative:    EXAMINATION:  US LOWER EXTREMITY VEINS RIGHT    CLINICAL HISTORY:  Pain in right lower leg    TECHNIQUE:  Duplex and color flow Doppler evaluation and graded compression of the right lower extremity veins was performed.    COMPARISON:  None    FINDINGS:  Right thigh veins: The common femoral, femoral, popliteal, and upper greater saphenous are patent and free of thrombus. The veins are normally compressible and have normal phasic flow and augmentation response.    Right calf veins: The visualized calf veins are patent.    Contralateral CFV: The contralateral (left) common femoral vein is patent and free of thrombus.    Miscellaneous: None                                       Medications   heparin 25,000 units in dextrose 5% (100 units/ml) IV bolus from bag INITIAL BOLUS (has no administration in time range)   heparin 25,000 units in dextrose 5% 250 mL (100 units/mL) infusion HIGH INTENSITY nomogram - [facility] (has no administration in time range)   heparin 25,000 units in dextrose 5% (100 units/ml) IV bolus from bag - ADDITIONAL PRN BOLUS - 60 units/kg (has no administration in time range)   heparin 25,000 units in dextrose 5% (100 units/ml) IV bolus from bag - ADDITIONAL PRN BOLUS - 30 units/kg (has no administration in time range)   melatonin tablet 6 mg (has no administration in time range)   ondansetron disintegrating tablet 8 mg (has no administration in time range)    promethazine tablet 25 mg (has no administration in time range)   polyethylene glycol packet 17 g (has no administration in time range)   bisacodyL suppository 10 mg (has no administration in time range)   acetaminophen tablet 650 mg (has no administration in time range)   glucose chewable tablet 16 g (has no administration in time range)   glucose chewable tablet 24 g (has no administration in time range)   glucagon (human recombinant) injection 1 mg (has no administration in time range)   dextrose 10% bolus 125 mL 125 mL (has no administration in time range)   dextrose 10% bolus 250 mL 250 mL (has no administration in time range)   iohexoL (OMNIPAQUE 350) injection 75 mL (75 mLs Intravenous Given 7/4/23 1927)     Medical Decision Making:   Initial Assessment:   43-year-old male with pmhx of atrial fibrillation (s/p ablation), anxiety/depression presents to ED with right calf pain and swelling x4 days.  He also notes shortness of breath or past 2-3 weeks.  He is nontoxic appearing.  Hemodynamically stable.  Right calf swelling noted on examination.  I will initiate workup and reassess.  Differential Diagnosis:   DVT, Baker's cyst, muscle strain, pulmonary embolism, CHF, arrhythmia  Independently Interpreted Test(s):   I have ordered and independently interpreted EKG Reading(s) - see prior notes  Clinical Tests:   Lab Tests: Ordered and Reviewed  Radiological Study: Ordered and Reviewed  Medical Tests: Ordered and Reviewed  ED Management:  - Venous ultrasound negative for DVT  - D-dimer elevated  - CT significant for pulmonary embolism. Patient started on heparin therapy   - EKG with normal sinus rhythm.  No observed right heart strain  - On reassessment patient is resting comfortably.  He is in no distress.  He speaks in full and complete sentences.  He remains hemodynamically stable  - Will plan to admit to Hospital Medicine for re-initiation of anticoagulation therapy and further workup.  History of AFib and  has been on anticoagulation with Xarelto for several months.  He stopped taking his Xarelto approximately 1 week ago and has been taking aspirin only.  Further workup needed to determine cause of PE   Other:   I discussed test(s) with the performing physician.           ED Course as of 07/04/23 2145 Tue Jul 04, 2023 1731 EKG, Per my independent interpretation, sinus 78 normal axis, normal ST segments [HS]   1924 D-Dimer(!): 1.25 [HM]   1944 Discussed CTA with radiology, concerning for bilateral PEs  [HS]      ED Course User Index  [HM] Tova Alejandro PA-C  [HS] Irene Ladd MD                 Clinical Impression:   Final diagnoses:  [M79.661, M79.89] Pain and swelling of lower leg, right  [R06.02] Shortness of breath  [I26.99] Pulmonary embolism, unspecified chronicity, unspecified pulmonary embolism type, unspecified whether acute cor pulmonale present (Primary)        ED Disposition Condition    Observation Stable                Tova Alejandro PA-C  07/04/23 2145

## 2023-07-04 NOTE — ED NOTES
Patient states right calf pain, reports cramping that began Friday am, no Xarelto since last Sunday. Had 14 hour car ride Thursday,reports SOB  worse x 3 days

## 2023-07-04 NOTE — ED NOTES
Patient identifiers verified and correct for  Mr Diop  C/C: Right calf pain SEE NN  APPEARANCE: awake and alert in NAD. PAIN  10/10  SKIN: warm, dry and intact. No breakdown or bruising.  MUSCULOSKELETAL: Patient moving all extremities spontaneously, no obvious swelling or deformities noted. Ambulates independently.  RESPIRATORY: Positive  shortness of breath.Respirations unlabored.   CARDIAC: Denies CP, 2+ distal pulses; no peripheral edema  ABDOMEN: S/ND/NT, Denies nausea  : voids spontaneously, denies difficulty  Neurologic: AAO x 4; follows commands equal strength in all extremities; denies numbness/tingling. Denies dizziness  Denies new weakness, reports calf pain

## 2023-07-04 NOTE — Clinical Note
Diagnosis: Pulmonary embolism, unspecified chronicity, unspecified pulmonary embolism type, unspecified whether acute cor pulmonale present [[id number]]   Future Attending Provider: JAMARI JOHNSON [[id number]]   Admitting Provider:: JAMARI JOHNSON [[id number]]

## 2023-07-05 LAB
ANION GAP SERPL CALC-SCNC: 11 MMOL/L (ref 8–16)
APTT PPP: 35.4 SEC (ref 21–32)
APTT PPP: 40.2 SEC (ref 21–32)
APTT PPP: 42.7 SEC (ref 21–32)
ASCENDING AORTA: 2.57 CM
AV INDEX (PROSTH): 0.88
AV MEAN GRADIENT: 4 MMHG
AV PEAK GRADIENT: 6 MMHG
AV VALVE AREA: 2.98 CM2
AV VELOCITY RATIO: 0.78
BASOPHILS # BLD AUTO: 0.04 K/UL (ref 0–0.2)
BASOPHILS # BLD AUTO: 0.04 K/UL (ref 0–0.2)
BASOPHILS NFR BLD: 0.4 % (ref 0–1.9)
BASOPHILS NFR BLD: 0.4 % (ref 0–1.9)
BSA FOR ECHO PROCEDURE: 2.3 M2
BUN SERPL-MCNC: 14 MG/DL (ref 6–20)
CALCIUM SERPL-MCNC: 8.6 MG/DL (ref 8.7–10.5)
CHLORIDE SERPL-SCNC: 102 MMOL/L (ref 95–110)
CO2 SERPL-SCNC: 24 MMOL/L (ref 23–29)
CREAT SERPL-MCNC: 1 MG/DL (ref 0.5–1.4)
CV ECHO LV RWT: 0.31 CM
DIFFERENTIAL METHOD: ABNORMAL
DIFFERENTIAL METHOD: ABNORMAL
DOP CALC AO PEAK VEL: 1.27 M/S
DOP CALC AO VTI: 21.13 CM
DOP CALC LVOT AREA: 3.4 CM2
DOP CALC LVOT DIAMETER: 2.08 CM
DOP CALC LVOT PEAK VEL: 0.99 M/S
DOP CALC LVOT STROKE VOLUME: 63 CM3
DOP CALCLVOT PEAK VEL VTI: 18.55 CM
E WAVE DECELERATION TIME: 312.19 MSEC
E/A RATIO: 0.97
E/E' RATIO: 4.75 M/S
ECHO LV POSTERIOR WALL: 0.7 CM (ref 0.6–1.1)
EJECTION FRACTION: 63 %
EOSINOPHIL # BLD AUTO: 0.3 K/UL (ref 0–0.5)
EOSINOPHIL # BLD AUTO: 0.3 K/UL (ref 0–0.5)
EOSINOPHIL NFR BLD: 2.6 % (ref 0–8)
EOSINOPHIL NFR BLD: 2.6 % (ref 0–8)
ERYTHROCYTE [DISTWIDTH] IN BLOOD BY AUTOMATED COUNT: 12.8 % (ref 11.5–14.5)
ERYTHROCYTE [DISTWIDTH] IN BLOOD BY AUTOMATED COUNT: 12.8 % (ref 11.5–14.5)
EST. GFR  (NO RACE VARIABLE): >60 ML/MIN/1.73 M^2
ESTIMATED AVG GLUCOSE: 120 MG/DL (ref 68–131)
FRACTIONAL SHORTENING: 32 % (ref 28–44)
GLUCOSE SERPL-MCNC: 120 MG/DL (ref 70–110)
GLUCOSE SERPL-MCNC: 129 MG/DL (ref 70–110)
HBA1C MFR BLD: 5.8 % (ref 4–5.6)
HCT VFR BLD AUTO: 40.7 % (ref 40–54)
HCT VFR BLD AUTO: 40.7 % (ref 40–54)
HGB BLD-MCNC: 13.7 G/DL (ref 14–18)
HGB BLD-MCNC: 13.7 G/DL (ref 14–18)
IMM GRANULOCYTES # BLD AUTO: 0.04 K/UL (ref 0–0.04)
IMM GRANULOCYTES # BLD AUTO: 0.04 K/UL (ref 0–0.04)
IMM GRANULOCYTES NFR BLD AUTO: 0.4 % (ref 0–0.5)
IMM GRANULOCYTES NFR BLD AUTO: 0.4 % (ref 0–0.5)
INTERVENTRICULAR SEPTUM: 0.78 CM (ref 0.6–1.1)
IVRT: 77.07 MSEC
LA MAJOR: 5.76 CM
LA MINOR: 5.69 CM
LA WIDTH: 4.08 CM
LEFT ATRIUM SIZE: 3.53 CM
LEFT ATRIUM VOLUME INDEX MOD: 26.1 ML/M2
LEFT ATRIUM VOLUME INDEX: 31.4 ML/M2
LEFT ATRIUM VOLUME MOD: 58.14 CM3
LEFT ATRIUM VOLUME: 70.08 CM3
LEFT INTERNAL DIMENSION IN SYSTOLE: 3.11 CM (ref 2.1–4)
LEFT VENTRICLE DIASTOLIC VOLUME INDEX: 42.78 ML/M2
LEFT VENTRICLE DIASTOLIC VOLUME: 95.4 ML
LEFT VENTRICLE MASS INDEX: 47 G/M2
LEFT VENTRICLE SYSTOLIC VOLUME INDEX: 17.1 ML/M2
LEFT VENTRICLE SYSTOLIC VOLUME: 38.09 ML
LEFT VENTRICULAR INTERNAL DIMENSION IN DIASTOLE: 4.56 CM (ref 3.5–6)
LEFT VENTRICULAR MASS: 105.04 G
LV LATERAL E/E' RATIO: 4.07 M/S
LV SEPTAL E/E' RATIO: 5.7 M/S
LYMPHOCYTES # BLD AUTO: 3.6 K/UL (ref 1–4.8)
LYMPHOCYTES # BLD AUTO: 3.6 K/UL (ref 1–4.8)
LYMPHOCYTES NFR BLD: 36.4 % (ref 18–48)
LYMPHOCYTES NFR BLD: 36.4 % (ref 18–48)
MAGNESIUM SERPL-MCNC: 2.2 MG/DL (ref 1.6–2.6)
MCH RBC QN AUTO: 29.5 PG (ref 27–31)
MCH RBC QN AUTO: 29.5 PG (ref 27–31)
MCHC RBC AUTO-ENTMCNC: 33.7 G/DL (ref 32–36)
MCHC RBC AUTO-ENTMCNC: 33.7 G/DL (ref 32–36)
MCV RBC AUTO: 88 FL (ref 82–98)
MCV RBC AUTO: 88 FL (ref 82–98)
MONOCYTES # BLD AUTO: 1.3 K/UL (ref 0.3–1)
MONOCYTES # BLD AUTO: 1.3 K/UL (ref 0.3–1)
MONOCYTES NFR BLD: 13.2 % (ref 4–15)
MONOCYTES NFR BLD: 13.2 % (ref 4–15)
MV A" WAVE DURATION": 11.13 MSEC
MV PEAK A VEL: 0.59 M/S
MV PEAK E VEL: 0.57 M/S
MV STENOSIS PRESSURE HALF TIME: 90.54 MS
MV VALVE AREA P 1/2 METHOD: 2.43 CM2
NEUTROPHILS # BLD AUTO: 4.7 K/UL (ref 1.8–7.7)
NEUTROPHILS # BLD AUTO: 4.7 K/UL (ref 1.8–7.7)
NEUTROPHILS NFR BLD: 47 % (ref 38–73)
NEUTROPHILS NFR BLD: 47 % (ref 38–73)
NRBC BLD-RTO: 0 /100 WBC
NRBC BLD-RTO: 0 /100 WBC
PHOSPHATE SERPL-MCNC: 4.1 MG/DL (ref 2.7–4.5)
PISA TR MAX VEL: 1.98 M/S
PLATELET # BLD AUTO: 260 K/UL (ref 150–450)
PLATELET # BLD AUTO: 260 K/UL (ref 150–450)
PMV BLD AUTO: 9.7 FL (ref 9.2–12.9)
PMV BLD AUTO: 9.7 FL (ref 9.2–12.9)
POCT GLUCOSE: 101 MG/DL (ref 70–110)
POCT GLUCOSE: 120 MG/DL (ref 70–110)
POCT GLUCOSE: 141 MG/DL (ref 70–110)
POCT GLUCOSE: 94 MG/DL (ref 70–110)
POTASSIUM SERPL-SCNC: 3.6 MMOL/L (ref 3.5–5.1)
PULM VEIN S/D RATIO: 1.24
PV PEAK D VEL: 0.29 M/S
PV PEAK S VEL: 0.36 M/S
RA MAJOR: 4.96 CM
RA PRESSURE: 8 MMHG
RA WIDTH: 3.53 CM
RBC # BLD AUTO: 4.65 M/UL (ref 4.6–6.2)
RBC # BLD AUTO: 4.65 M/UL (ref 4.6–6.2)
RIGHT VENTRICULAR END-DIASTOLIC DIMENSION: 3.6 CM
SINUS: 2.74 CM
SODIUM SERPL-SCNC: 137 MMOL/L (ref 136–145)
STJ: 2.34 CM
TDI LATERAL: 0.14 M/S
TDI SEPTAL: 0.1 M/S
TDI: 0.12 M/S
TR MAX PG: 16 MMHG
TRICUSPID ANNULAR PLANE SYSTOLIC EXCURSION: 1.92 CM
TV REST PULMONARY ARTERY PRESSURE: 24 MMHG
WBC # BLD AUTO: 9.94 K/UL (ref 3.9–12.7)
WBC # BLD AUTO: 9.94 K/UL (ref 3.9–12.7)

## 2023-07-05 PROCEDURE — 25000003 PHARM REV CODE 250

## 2023-07-05 PROCEDURE — 63600175 PHARM REV CODE 636 W HCPCS: Performed by: PHYSICIAN ASSISTANT

## 2023-07-05 PROCEDURE — 80048 BASIC METABOLIC PNL TOTAL CA: CPT | Performed by: PHYSICIAN ASSISTANT

## 2023-07-05 PROCEDURE — 85025 COMPLETE CBC W/AUTO DIFF WBC: CPT | Performed by: PHYSICIAN ASSISTANT

## 2023-07-05 PROCEDURE — G0378 HOSPITAL OBSERVATION PER HR: HCPCS

## 2023-07-05 PROCEDURE — 85730 THROMBOPLASTIN TIME PARTIAL: CPT | Mod: 91 | Performed by: INTERNAL MEDICINE

## 2023-07-05 PROCEDURE — 85730 THROMBOPLASTIN TIME PARTIAL: CPT | Performed by: PHYSICIAN ASSISTANT

## 2023-07-05 PROCEDURE — 25000003 PHARM REV CODE 250: Performed by: PHYSICIAN ASSISTANT

## 2023-07-05 PROCEDURE — 82962 GLUCOSE BLOOD TEST: CPT

## 2023-07-05 PROCEDURE — 83735 ASSAY OF MAGNESIUM: CPT | Performed by: PHYSICIAN ASSISTANT

## 2023-07-05 PROCEDURE — 96366 THER/PROPH/DIAG IV INF ADDON: CPT

## 2023-07-05 PROCEDURE — 99233 SBSQ HOSP IP/OBS HIGH 50: CPT | Mod: ,,,

## 2023-07-05 PROCEDURE — 84100 ASSAY OF PHOSPHORUS: CPT | Performed by: PHYSICIAN ASSISTANT

## 2023-07-05 PROCEDURE — 83036 HEMOGLOBIN GLYCOSYLATED A1C: CPT | Performed by: PHYSICIAN ASSISTANT

## 2023-07-05 PROCEDURE — 36415 COLL VENOUS BLD VENIPUNCTURE: CPT | Performed by: INTERNAL MEDICINE

## 2023-07-05 PROCEDURE — 96376 TX/PRO/DX INJ SAME DRUG ADON: CPT | Mod: 59

## 2023-07-05 PROCEDURE — 99233 PR SUBSEQUENT HOSPITAL CARE,LEVL III: ICD-10-PCS | Mod: ,,,

## 2023-07-05 RX ORDER — LAMOTRIGINE 25 MG/1
50 TABLET ORAL DAILY
Status: DISCONTINUED | OUTPATIENT
Start: 2023-07-06 | End: 2023-07-06 | Stop reason: HOSPADM

## 2023-07-05 RX ORDER — LISINOPRIL 20 MG/1
20 TABLET ORAL NIGHTLY
Status: DISCONTINUED | OUTPATIENT
Start: 2023-07-05 | End: 2023-07-06 | Stop reason: HOSPADM

## 2023-07-05 RX ORDER — COLCHICINE 0.6 MG/1
0.6 TABLET, FILM COATED ORAL DAILY
Status: DISCONTINUED | OUTPATIENT
Start: 2023-07-05 | End: 2023-07-06 | Stop reason: HOSPADM

## 2023-07-05 RX ORDER — IBUPROFEN 200 MG
400 TABLET ORAL DAILY PRN
COMMUNITY
End: 2023-07-12

## 2023-07-05 RX ORDER — ACETAMINOPHEN 325 MG/1
650 TABLET ORAL DAILY PRN
COMMUNITY

## 2023-07-05 RX ORDER — MULTIVITAMIN
1 TABLET ORAL DAILY
COMMUNITY

## 2023-07-05 RX ORDER — DIPHENHYDRAMINE HCL 25 MG
25 TABLET ORAL NIGHTLY PRN
COMMUNITY
End: 2023-07-12

## 2023-07-05 RX ORDER — AMOXICILLIN 500 MG
1 CAPSULE ORAL EVERY MORNING
COMMUNITY

## 2023-07-05 RX ORDER — HYDROCODONE BITARTRATE AND ACETAMINOPHEN 5; 325 MG/1; MG/1
1 TABLET ORAL EVERY 6 HOURS PRN
Status: DISCONTINUED | OUTPATIENT
Start: 2023-07-05 | End: 2023-07-06 | Stop reason: HOSPADM

## 2023-07-05 RX ADMIN — HYDROCODONE BITARTRATE AND ACETAMINOPHEN 1 TABLET: 5; 325 TABLET ORAL at 02:07

## 2023-07-05 RX ADMIN — LAMOTRIGINE 50 MG: 25 TABLET ORAL at 08:07

## 2023-07-05 RX ADMIN — COLCHICINE 0.6 MG: 0.6 TABLET, FILM COATED ORAL at 08:07

## 2023-07-05 RX ADMIN — VERAPAMIL HYDROCHLORIDE 120 MG: 120 TABLET, FILM COATED, EXTENDED RELEASE ORAL at 09:07

## 2023-07-05 RX ADMIN — HEPARIN SODIUM 20 UNITS/KG/HR: 10000 INJECTION, SOLUTION INTRAVENOUS at 08:07

## 2023-07-05 RX ADMIN — LISINOPRIL 20 MG: 20 TABLET ORAL at 10:07

## 2023-07-05 RX ADMIN — ACETAMINOPHEN 650 MG: 325 TABLET ORAL at 09:07

## 2023-07-05 RX ADMIN — HEPARIN SODIUM 18 UNITS/KG/HR: 10000 INJECTION, SOLUTION INTRAVENOUS at 07:07

## 2023-07-05 RX ADMIN — LORAZEPAM 0.5 MG: 0.5 TABLET ORAL at 09:07

## 2023-07-05 NOTE — ASSESSMENT & PLAN NOTE
Status post catheter ablation of atrial flutter  - taken off xarelto 1 wk ago s/p ablation  - heparin for AC for now  - continue verapamil

## 2023-07-05 NOTE — PROGRESS NOTES
[facility]ation [address] [place] [address] Medicine  Progress Note    Patient Name: Wilbur Diop Jr.  MRN: [id number]  Patient Class: OP- Observation   Admission Date: 7/4/2023  Length of Stay: 0 days  Attending Physician: Kevan Hines MD  Primary Care Provider: Ge Naranjo MD        Subjective:     Principal Problem:Bilateral pulmonary embolism        HPI:  Wilbur Diop Jr. Is a 43 y.o. male with a PMHx of atrial fibrillation (s/p ablation 01/2023), anxiety/depression who presents to [facility] for evaluation of shortness of breath and right calf pain. Patient reports worsening shortness of breath and cough for the past 3 weeks. Shortness of breath occurs with minimal exertion, no significant SOB at rest. He then developed right calf pain and swelling 4 days ago after driving 14 hours to [place]. The pain and swelling worsened today after returning home so he presented to the ED for further evalition. He was previously on xarelto for Afib but this was stopped 1-2 weeks ago as advised by his electrophysiologist and is currently on taking ASA. Denies fever, chills, chest pain, congestion, HA, numbness/tingling, vision changes or syncope.     ED: AFVSS. No leukocytosis or electrolyte abnormalities. D-dimer 1.25. RLE US negative for DVT. CTA chest shows bilateral pulmonary emboli without RH strain. Started on heparin gtt.       Overview/Hospital Course:  Wilbur Diop is 43 y.o. male admitted to observation for bilateral PE. LE US w/o DVT. CTA confirmed PE. Started on heparin gtt.       Interval History: NAEON. AF, VSS. Patient reports improvement in SOB and cough. Endorses continued RLE calf pain which is tender to touch. Continued on heparin drip. Echo reviewed. Patient and wife hesitant to resume Xarelto as symptoms began while taking the medication. Will switch to oral medication tomorrow. Will monitor O2 status on RA and improvement in calf pain.     Review of Systems   Constitutional:  Negative for  chills and fever.   Respiratory:  Positive for cough (improved) and shortness of breath (improved). Negative for chest tightness.    Cardiovascular:  Positive for leg swelling. Negative for chest pain.   Gastrointestinal:  Negative for abdominal pain and nausea.   Neurological:  Negative for dizziness and weakness.   Objective:     Vital Signs (Most Recent):  Temp: 97.9 °F (36.6 °C) (07/05/23 1507)  Pulse: 79 (07/05/23 1513)  Resp: 18 (07/05/23 1507)  BP: (!) 140/86 (07/05/23 1507)  SpO2: 95 % (07/05/23 1507) Vital Signs (24h Range):  Temp:  [97.9 °F (36.6 °C)-98.9 °F (37.2 °C)] 97.9 °F (36.6 °C)  Pulse:  [74-92] 79  Resp:  [16-20] 18  SpO2:  [95 %-98 %] 95 %  BP: (101-154)/(62-89) 140/86     Weight: 114 kg (251 lb 5.2 oz)  Body mass index is 37.11 kg/m².  No intake or output data in the 24 hours ending 07/05/23 1514      Physical Exam  Vitals and nursing note reviewed.   Constitutional:       Appearance: He is well-developed.   Eyes:      Pupils: Pupils are equal, round, and reactive to light.   Cardiovascular:      Rate and Rhythm: Normal rate and regular rhythm.   Pulmonary:      Effort: Pulmonary effort is normal.      Breath sounds: Normal breath sounds.   Abdominal:      Palpations: Abdomen is soft.      Tenderness: There is no abdominal tenderness.   Musculoskeletal:         General: Tenderness (RLE) present.   Skin:     General: Skin is warm and dry.   Neurological:      Mental Status: He is alert and oriented to person, place, and time.   Psychiatric:         Behavior: Behavior normal.           Significant Labs: All pertinent labs within the past 24 hours have been reviewed.    Significant Imaging: I have reviewed all pertinent imaging results/findings within the past 24 hours.      Assessment/Plan:      * Bilateral pulmonary embolism  - satting 98% on RA  - EKG without acute changes  - LE US negative for DVT  - CTA chest with bilateral PE but no evidence of RH strain  - continue heparin gtt- transition to  oral medication tomorrow  - monitor tele  - increased risk of VTE given testosterone use and recent travel but warrants hypercoagulability workup given symptoms began while on xarelto  - referral to heme/onc on discharge  Results for orders placed during the hospital encounter of 07/04/23    Echo    Interpretation Summary  · The left ventricle is normal in size with normal systolic function.  · The estimated ejection fraction is 63%.  · Normal left ventricular diastolic function.  · Normal right ventricular size with normal right ventricular systolic function.  · Mild right atrial enlargement.  · Intermediate central venous pressure (8 mmHg).  · The estimated PA systolic pressure is 24 mmHg.    Hypogonadism in male  - on Testosterone injections q2wks as outpatient, will likely need to be DC'ed    Paroxysmal atrial fibrillation  Status post catheter ablation of atrial flutter  - taken off xarelto 1 wk ago s/p ablation  - heparin for AC for now  - continue verapamil     RACHEL (generalized anxiety disorder)  - continue lamictal and ativan       VTE Risk Mitigation (From admission, onward)         Ordered     heparin 25,000 units in dextrose 5% (100 units/ml) IV bolus from bag - ADDITIONAL PRN BOLUS - 60 units/kg  As needed (PRN)        Question:  Heparin Infusion Adjustment (DO NOT MODIFY ANSWER)  Answer:  \\[email or web address]\epic\Images\Pharmacy\HeparinInfusions\heparin HIGH INTENSITY nomogram for [facility] [id number]    07/04/23 2002     heparin 25,000 units in dextrose 5% (100 units/ml) IV bolus from bag - ADDITIONAL PRN BOLUS - 30 units/kg  As needed (PRN)        Question:  Heparin Infusion Adjustment (DO NOT MODIFY ANSWER)  Answer:  \\[email or web address]\epic\Images\Pharmacy\HeparinInfusions\heparin HIGH INTENSITY nomogram for [facility] [id number]    07/04/23 2002     Reason for No Pharmacological VTE Prophylaxis  Once        Question:  Reasons:  Answer:  Physician Provided (leave comment)  Comment:  on heparin gtt    07/04/23 6708     IP  VTE HIGH RISK PATIENT  Once         07/04/23 2125     heparin 25,000 units in dextrose 5% 250 mL (100 units/mL) infusion HIGH INTENSITY nomogram - [facility]  Continuous        Question Answer Comment   Heparin Infusion Adjustment (DO NOT MODIFY ANSWER) [email or web address]\epic\Images\Pharmacy\HeparinInfusions\heparin HIGH INTENSITY nomogram for [facility] [id number]    Begin at (in units/kg/hr) 18        07/04/23 2002                Discharge Planning   CARIN: 7/7/2023     Code Status: Full Code   Is the patient medically ready for discharge?: No    Reason for patient still in hospital (select all that apply): Patient trending condition and Treatment  Discharge Plan A: Home with family            Rosie Melo PA-C  Department of Hospital Medicine   [facility] - Observation 11H

## 2023-07-05 NOTE — ASSESSMENT & PLAN NOTE
- satting 98% on RA  - EKG without acute changes  - LE US negative for DVT  - CTA chest with bilateral PE but no evidence of RH strain  - check TTE  - continue heparin gtt  - monitor tele  - increased risk of VTE given testosterone use and recent travel but warrants hypercoagulability workup given symptoms began while on xarelto  - referral to heme/onc on discharge

## 2023-07-05 NOTE — HOSPITAL COURSE
Wilbur Diop is 43 y.o. male admitted to observation for bilateral PE. LE US w/o DVT. CTA confirmed PE. Started on heparin gtt. Patient's symptoms of SOB and cough resolved. LE tenderness persists, but repeat dopper US without DVT. Transitioned to therapeutic oral AC with Xarelto 15 mg BID x21 days followed by 20 mg nightly to complete 6-month course. Discontinue testosterone injections given risk of clotting. Encouraged physical activity. Follow up with hematology clinic to further assess patient's risk. Patient discharged in stable condition. All questions answered.

## 2023-07-05 NOTE — PLAN OF CARE
07/05/23 0039   Post-Acute Status   Post-Acute Authorization Other   Other Status No Post-Acute Service Needs   Discharge Plan   Discharge Plan A Home with family   Discharge Plan B Home         Sw met pt and pt's wife Sarah Beth at bedside. Pt stated he had difficulty walking due to his recent leg pain. Pt stated due to his recent illness, he's unable to perform his regular activities . Pt ambulates without assistance. Case management services delayed pending pt's medical  needs.     Gloria Navarrete LMSW  Case Management  Emergency Department  [phone]

## 2023-07-05 NOTE — SUBJECTIVE & OBJECTIVE
Interval History: NAEON. AF, VSS. Patient reports improvement in SOB and cough. Endorses continued RLE calf pain which is tender to touch. Continued on heparin drip. Echo reviewed. Patient and wife hesitant to resume Xarelto as symptoms began while taking the medication. Will switch to oral medication tomorrow. Will monitor O2 status on RA and improvement in calf pain.     Review of Systems   Constitutional:  Negative for chills and fever.   Respiratory:  Positive for cough (improved) and shortness of breath (improved). Negative for chest tightness.    Cardiovascular:  Positive for leg swelling. Negative for chest pain.   Gastrointestinal:  Negative for abdominal pain and nausea.   Neurological:  Negative for dizziness and weakness.   Objective:     Vital Signs (Most Recent):  Temp: 97.9 °F (36.6 °C) (07/05/23 1507)  Pulse: 79 (07/05/23 1513)  Resp: 18 (07/05/23 1507)  BP: (!) 140/86 (07/05/23 1507)  SpO2: 95 % (07/05/23 1507) Vital Signs (24h Range):  Temp:  [97.9 °F (36.6 °C)-98.9 °F (37.2 °C)] 97.9 °F (36.6 °C)  Pulse:  [74-92] 79  Resp:  [16-20] 18  SpO2:  [95 %-98 %] 95 %  BP: (101-154)/(62-89) 140/86     Weight: 114 kg (251 lb 5.2 oz)  Body mass index is 37.11 kg/m².  No intake or output data in the 24 hours ending 07/05/23 1514      Physical Exam  Vitals and nursing note reviewed.   Constitutional:       Appearance: He is well-developed.   Eyes:      Pupils: Pupils are equal, round, and reactive to light.   Cardiovascular:      Rate and Rhythm: Normal rate and regular rhythm.   Pulmonary:      Effort: Pulmonary effort is normal.      Breath sounds: Normal breath sounds.   Abdominal:      Palpations: Abdomen is soft.      Tenderness: There is no abdominal tenderness.   Musculoskeletal:         General: Tenderness (RLE) present.   Skin:     General: Skin is warm and dry.   Neurological:      Mental Status: He is alert and oriented to person, place, and time.   Psychiatric:         Behavior: Behavior normal.            Significant Labs: All pertinent labs within the past 24 hours have been reviewed.    Significant Imaging: I have reviewed all pertinent imaging results/findings within the past 24 hours.

## 2023-07-05 NOTE — ED NOTES
Telemetry Verification   Patient placed on Telemetry Box  Verified with War Room  Box # [id number]   Monitor Tech    Rate 83   Rhythm NSR

## 2023-07-05 NOTE — ED NOTES
LOC: The patient is awake and alert; oriented x 3 and speaking appropriately.  APPEARANCE: Patient resting comfortably, patient is clean and well groomed  SKIN: warm and dry, normal skin turgor & moist mucus membranes, skin intact, no breakdown noted.  MUSCULOSKELETAL: Patient moving all extremities well, no obvious swelling or deformities noted, c/o bilateral leg pain   RESPIRATORY: Airway is open and patent,  respirations are spontaneous, normal effort and rate  CARDIAC: Patient has a normal rate, no peripheral edema noted, capillary refill < 3 seconds; No complaints of chest pain , remains on  portable tele   ABDOMEN: Soft and non tender to palpation, no distention noted.

## 2023-07-05 NOTE — HPI
Wilbur Diop Jr. Is a 43 y.o. male with a PMHx of atrial fibrillation (s/p ablation 01/2023), anxiety/depression who [place] [facility] [place] of breath and right calf pain. Patient reports worsening shortness of breath and cough for the past 3 weeks. Shortness of breath occurs with minimal exertion, no significant SOB at rest. He then developed right calf pain and swelling 4 days ago after driving 14 hours to [place]. The pain and swelling worsened today after returning home so he presented to the ED for further evalition. He was previously on xarelto for Afib but this was stopped 1-2 weeks ago as advised by his electrophysiologist and is currently on taking ASA. Denies fever, chills, chest pain, congestion, HA, numbness/tingling, vision changes or syncope.     ED: AFVSS. No leukocytosis or electrolyte abnormalities. D-dimer 1.25. RLE US negative for DVT. CTA chest shows bilateral pulmonary emboli without RH strain. Started on heparin gtt.

## 2023-07-05 NOTE — ED NOTES
Pt remains on cardiac monitor, continuous pulse ox, cycling blood pressures. Side rails up x2, call bell in reach, bed in low position with brake engaged.  C/o leg pain , remains oc  tele boc, IV site asymptomatic  w/ heparin infusing at 15.5ml/hr= 18u/kg/hr. Wife at bedside- waiting for echo.

## 2023-07-05 NOTE — ASSESSMENT & PLAN NOTE
- satting 98% on RA  - EKG without acute changes  - LE US negative for DVT  - CTA chest with bilateral PE but no evidence of RH strain  - continue heparin gtt- transition to oral medication tomorrow  - monitor tele  - increased risk of VTE given testosterone use and recent travel but warrants hypercoagulability workup given symptoms began while on xarelto  - referral to heme/onc on discharge  Results for orders placed during the hospital encounter of 07/04/23    Echo    Interpretation Summary  · The left ventricle is normal in size with normal systolic function.  · The estimated ejection fraction is 63%.  · Normal left ventricular diastolic function.  · Normal right ventricular size with normal right ventricular systolic function.  · Mild right atrial enlargement.  · Intermediate central venous pressure (8 mmHg).  · The estimated PA systolic pressure is 24 mmHg.

## 2023-07-05 NOTE — H&P
[facility] - Emergency Dept  [facility] Medicine  History & Physical    Patient Name: Wilbur Diop Jr.  MRN: [id number]  Patient Class: OP- Observation  Admission Date: 7/4/2023  Attending Physician: Kevan Hines MD   Primary Care Provider: Ge Naranjo MD         Patient information was obtained from patient, past medical records and ER records.     Subjective:     Principal Problem:Bilateral pulmonary embolism    Chief Complaint:   Chief Complaint   Patient presents with    Leg Pain     Stopped xeralto, and put asa on thurs , pain to r calf        HPI: Wilbur Diop Jr. Is a 43 y.o. male with a PMHx of atrial fibrillation (s/p ablation 01/2023), anxiety/depression who [place] [facility] [place] of shortness of breath and right calf pain. Patient reports worsening shortness of breath and cough for the past 3 weeks. Shortness of breath occurs with minimal exertion, no significant SOB at rest. He then developed right calf pain and swelling 4 days ago after driving 14 hours to [place]. The pain and swelling worsened today after returning home so he presented to the ED for further evalition. He was previously on xarelto for Afib but this was stopped 1-2 weeks ago as advised by his electrophysiologist and is currently on taking ASA. Denies fever, chills, chest pain, congestion, HA, numbness/tingling, vision changes or syncope.     ED: AFVSS. No leukocytosis or electrolyte abnormalities. D-dimer 1.25. RLE US negative for DVT. CTA chest shows bilateral pulmonary emboli without RH strain. Started on heparin gtt.       Past Medical History:   Diagnosis Date    ADHD (attention deficit hyperactivity disorder)     Allergy     Anxiety     Atrial fibrillation     Atrial flutter     Depression     History of psychiatric hospitalization     2002 for depression    Hx of psychiatric care     Psychiatric problem     Supraventricular tachycardia     Therapy        Past Surgical History:   Procedure  Laterality Date    ABLATION, ATRIAL FIBRILLATION, CRYO N/A 01/24/2023    Procedure: Ablation, Atrial Fibrillation, Cryo;  Surgeon: Carlos Daugherty MD;  Location: [facility] EP LAB;  Service: Cardiology;  Laterality: N/A;  AF, PVI, CRYO, RAFAEL, CHANEL, GEN, DM, 3prep    APPENDECTOMY      HERNIA REPAIR      TRANSESOPHAGEAL ECHOCARDIOGRAPHY N/A 01/24/2023    Procedure: ECHOCARDIOGRAM, TRANSESOPHAGEAL;  Surgeon: Stacia Howell MD;  Location: [facility] EP LAB;  Service: Cardiology;  Laterality: N/A;       Review of patient's allergies indicates:  No Known Allergies    No current facility-administered medications on file prior to encounter.     Current Outpatient Medications on File Prior to Encounter   Medication Sig    aspirin (ECOTRIN) 81 MG EC tablet Take 81 mg by mouth once daily.    lamoTRIgine (LAMICTAL) 25 MG tablet TAKE 1 TABLET BY MOUTH DAILY FOR 14 DAYS, THEN INCREASE TO 2 TABLETS DAILY.    lisinopriL (PRINIVIL,ZESTRIL) 20 MG tablet Take 1 tablet (20 mg total) by mouth once daily. (Patient taking differently: Take 20 mg by mouth every evening.)    verapamiL (CALAN-SR) 120 MG CR tablet Take 1 tablet (120 mg total) by mouth every evening.    ALPRAZolam (XANAX) 0.5 MG tablet Take 1 tablet (0.5 mg total) by mouth once. Prior to MRI for 1 dose (Patient not taking: Reported on 6/29/2023)    cetirizine (ZYRTEC) 5 MG tablet Take 5 mg by mouth 2 (two) times daily as needed.    colchicine (COLCRYS) 0.6 mg tablet Take 2 tablets by mouth once, then take 1 tablet by mouth 1 hour later.  Then take 1 tablet by mouth daily until symptoms resolve (Patient not taking: Reported on 6/29/2023)    dexmethylphenidate (FOCALIN XR) 5 MG 24 hr capsule Take 5 mg by mouth every morning.    ergocalciferol, vitamin D2, (VITAMIN D ORAL) Take by mouth.    EScitalopram oxalate (LEXAPRO) 10 MG tablet Take 10 mg by mouth once daily.    LORazepam (ATIVAN) 0.5 MG tablet Take 0.5 mg by mouth 2 (two) times daily as needed.    MAGNESIUM ORAL  "Take by mouth.    methylPREDNISolone (MEDROL DOSEPACK) 4 mg tablet use as directed (Patient not taking: Reported on 2023)    needle, disp, 18 G 18 gauge x 1" Ndle For T injection    needle, disp, 21 G 21 gauge x 1 1/2" Ndle For T injection    SPRAVATO 84 mg (28 mg x 3) Spry Take 56 mg    syringe, disposable, (BD LUER-GILL SYRINGE) 3 mL Syrg For T injection    testosterone cypionate (DEPOTESTOTERONE CYPIONATE) 200 mg/mL injection Inject 1 mL (200 mg total) into the muscle every 14 (fourteen) days.    zinc 50 mg Tab Take by mouth.     Family History       Problem Relation (Age of Onset)    Anxiety disorder Mother    Cancer Maternal Grandfather    Depression Mother    No Known Problems Father, Sister, Brother, Maternal Aunt, Maternal Uncle, Paternal Aunt, Paternal Uncle, Maternal Grandmother, Paternal Grandmother, Paternal Grandfather          Tobacco Use    Smoking status: Former     Packs/day: 0.00     Years: 0.00     Pack years: 0.00     Types: Cigarettes     Quit date: 10/22/2004     Years since quittin.7    Smokeless tobacco: Never   Substance and Sexual Activity    Alcohol use: Not Currently     Comment: once a month; socially    Drug use: No    Sexual activity: Not Currently     Partners: Female     Birth control/protection: None     Review of Systems   Constitutional:  Negative for activity change, chills and fever.   HENT:  Negative for trouble swallowing.    Eyes:  Negative for photophobia and visual disturbance.   Respiratory:  Positive for cough and shortness of breath. Negative for chest tightness and wheezing.    Cardiovascular:  Positive for leg swelling. Negative for chest pain and palpitations.   Gastrointestinal:  Negative for abdominal pain, constipation, diarrhea, nausea and vomiting.   Genitourinary:  Negative for dysuria, frequency, hematuria and urgency.   Musculoskeletal:  Positive for myalgias. Negative for arthralgias, back pain and gait problem.   Skin:  Negative for " color change and rash.   Neurological:  Negative for dizziness, syncope, weakness, light-headedness, numbness and headaches.   Psychiatric/Behavioral:  Negative for agitation and confusion. The patient is not nervous/anxious.    Objective:     Vital Signs (Most Recent):  Temp: 98.9 °F (37.2 °C) (07/04/23 1828)  Pulse: 91 (07/04/23 2202)  Resp: 16 (07/04/23 1828)  BP: 126/85 (07/04/23 2202)  SpO2: 97 % (07/04/23 2202) Vital Signs (24h Range):  Temp:  [98.6 °F (37 °C)-98.9 °F (37.2 °C)] 98.9 °F (37.2 °C)  Pulse:  [79-92] 91  Resp:  [16] 16  SpO2:  [97 %-98 %] 97 %  BP: (122-133)/(75-85) 126/85     Weight: 108.9 kg (240 lb)  Body mass index is 35.44 kg/m².     Physical Exam  Vitals and nursing note reviewed.   Constitutional:       General: He is not in acute distress.     Appearance: He is well-developed.   HENT:      Head: Normocephalic and atraumatic.      Mouth/Throat:      Pharynx: No oropharyngeal exudate.   Eyes:      General: No scleral icterus.     Conjunctiva/sclera: Conjunctivae normal.   Cardiovascular:      Rate and Rhythm: Normal rate and regular rhythm.      Heart sounds: Normal heart sounds.   Pulmonary:      Effort: Pulmonary effort is normal. No respiratory distress.      Breath sounds: Normal breath sounds. No wheezing.   Abdominal:      General: Bowel sounds are normal. There is no distension.      Palpations: Abdomen is soft.      Tenderness: There is no abdominal tenderness.   Musculoskeletal:         General: Swelling (RLE) and tenderness (R calf) present. Normal range of motion.      Cervical back: Normal range of motion and neck supple.   Lymphadenopathy:      Cervical: No cervical adenopathy.   Skin:     General: Skin is warm and dry.      Capillary Refill: Capillary refill takes less than 2 seconds.      Findings: No rash.   Neurological:      Mental Status: He is alert and oriented to person, place, and time.      Cranial Nerves: No cranial nerve deficit.      Sensory: No sensory deficit.       Coordination: Coordination normal.   Psychiatric:         Behavior: Behavior normal.         Thought Content: Thought content normal.         Judgment: Judgment normal.              Significant Labs: All pertinent labs within the past 24 hours have been reviewed.  CBC:   Recent Labs   Lab 07/04/23 1821 07/04/23 2029   WBC 11.00 11.82   HGB 15.0 15.2   HCT 43.8 44.5    278     CMP:   Recent Labs   Lab 07/04/23 1821      K 4.3      CO2 25      BUN 16   CREATININE 0.9   CALCIUM 9.2   PROT 7.3   ALBUMIN 3.8   BILITOT 0.4   ALKPHOS 72   AST 20   ALT 31   ANIONGAP 11       Significant Imaging: I have reviewed all pertinent imaging results/findings within the past 24 hours.  CTA Chest Non-Coronary (PE Studies)  Narrative: EXAMINATION:  CTA CHEST NON CORONARY (PE STUDIES)    CLINICAL HISTORY:  Pulmonary embolism (PE) suspected, positive D-dimer;    TECHNIQUE:  Low dose axial images, sagittal and coronal reformations were obtained from the thoracic inlet to the lung bases.  75 cc Omnipaque 350 intravenous contrast administered according to PE protocol..    COMPARISON:  CTA 01/17/2023    FINDINGS:  Base of Neck: No significant abnormality.Thyroid appears within normal limits.    Thoracic soft tissues: Unremarkable.    Claudette/Mediastinum: No pathologic henry enlargement.    Heart: Normal size. No pericardial effusion.    Pulmonary vasculature: Right greater than left bilateral pulmonary emboli.  No large central type pulmonary embolus.  The most centrally located embolus on the right is noted in a segmental branch (2-217), otherwise, there are multiple pulmonary emboli visualized through the subsegmental branches of the right pulmonary artery (for example 2-317, 2-268, 2-248),.  Small subsegmental embolus on the left (2-288).  Small focus of air noted in the main pulmonary artery, likely related to injection of contrast.    Aorta: Left-sided aortic arch with 3 branch vessels.  No aneurysm,  dissection, and no significant atherosclerosis.    Trachea and Proximal airways: Patent.    Lungs/Pleura: Respiratory motion artifact at the lung bases.  Symmetrically expanded without consolidation or pneumothorax.  No pleural effusion..  Small calcified granuloma in the medial left upper lobe, unchanged from prior.    Esophagus: Normal course and caliber.    Upper Abdomen: Hepatic steatosis.    Bones: No acute fracture. No suspicious lytic or sclerotic lesions.  Age-appropriate degenerative changes.  Impression: Right greater than left bilateral pulmonary emboli, as detailed above, with the most central involving a segmental branch of the right pulmonary artery.    Hepatic steatosis.    The critical information above was relayed directly by Annabella Matthew by telephone to Irene Ladd MD on 7/4/2023 at 19:49.    This report was flagged in Epic as abnormal.    Electronically signed by resident: Annabella Matthew  Date:    07/04/2023  Time:    19:28    Electronically signed by: Wilbur Tolbert MD  Date:    07/04/2023  Time:    20:20  US Lower Extremity Veins Right  Narrative: EXAMINATION:  US LOWER EXTREMITY VEINS RIGHT    CLINICAL HISTORY:  Pain in right lower leg    TECHNIQUE:  Duplex and color flow Doppler evaluation and graded compression of the right lower extremity veins was performed.    COMPARISON:  None    FINDINGS:  Right thigh veins: The common femoral, femoral, popliteal, and upper greater saphenous are patent and free of thrombus. The veins are normally compressible and have normal phasic flow and augmentation response.    Right calf veins: The visualized calf veins are patent.    Contralateral CFV: The contralateral (left) common femoral vein is patent and free of thrombus.    Miscellaneous: None  Impression: No evidence of deep venous thrombosis in the right lower extremity.    Electronically signed by resident: Cecelia Longoria  Date:    07/04/2023  Time:    18:02    Electronically signed by: [place]  MD [place]  Date:    07/04/2023  Time:    18:14        Assessment/Plan:     * Bilateral pulmonary embolism  - satting 98% on RA  - EKG without acute changes  - LE US negative for DVT  - CTA chest with bilateral PE but no evidence of RH strain  - check TTE  - continue heparin gtt  - monitor tele  - increased risk of VTE given testosterone use and recent travel but warrants hypercoagulability workup given symptoms began while on xarelto  - referral to heme/onc on discharge    Hypogonadism in male  - on Testosterone injections q2wks as outpatient, will likely need to be DC'ed    Paroxysmal atrial fibrillation  Status post catheter ablation of atrial flutter  - taken off xarelto 1 wk ago s/p ablation  - heparin for AC for now  - continue verapamil     RACHEL (generalized anxiety disorder)  - continue lamictal and ativan       VTE Risk Mitigation (From admission, onward)         Ordered     heparin 25,000 units in dextrose 5% (100 units/ml) IV bolus from bag - ADDITIONAL PRN BOLUS - 60 units/kg  As needed (PRN)        Question:  Heparin Infusion Adjustment (DO NOT MODIFY ANSWER)  Answer:  [email or web address]\[email or web address]\Images\Pharmacy\HeparinInfusions\heparin HIGH INTENSITY nomogram for [facility] [id number]    07/04/23 2002     heparin 25,000 units in dextrose 5% (100 units/ml) IV bolus from bag - ADDITIONAL PRN BOLUS - 30 units/kg  As needed (PRN)        Question:  Heparin Infusion Adjustment (DO NOT MODIFY ANSWER)  Answer:  \[email or web address]\[email or web address]\Images\Pharmacy\HeparinInfusions\heparin HIGH INTENSITY nomogram for [facility] [id number].pdf    07/04/23 2002     Reason for No Pharmacological VTE Prophylaxis  Once        Question:  Reasons:  Answer:  Physician Provided (leave comment)  Comment:  on heparin gtt    07/04/23 2125     IP VTE HIGH RISK PATIENT  Once         07/04/23 2125     heparin 25,000 units in dextrose 5% 250 mL (100 units/mL) infusion HIGH INTENSITY nomogram - [facility]  Continuous        Question Answer Comment   Heparin Infusion Adjustment (DO  NOT MODIFY ANSWER) [email or web address]\Images\Pharmacy\HeparinInfusions\heparin HIGH INTENSITY nomogram for [facility] [id number]    Begin at (in units/kg/hr) 18        07/04/23 2002                     On 07/04/2023, patient should be placed in hospital observation services under my care in collaboration with Dr. Wilbur Garibay.      Odalys Banuelos PA-C  Department of Hospital Medicine  [facility] - Emergency Dept

## 2023-07-05 NOTE — SUBJECTIVE & OBJECTIVE
Past Medical History:   Diagnosis Date    ADHD (attention deficit hyperactivity disorder)     Allergy     Anxiety     Atrial fibrillation     Atrial flutter     Depression     History of psychiatric hospitalization     2002 for depression    Hx of psychiatric care     Psychiatric problem     Supraventricular tachycardia     Therapy        Past Surgical History:   Procedure Laterality Date    ABLATION, ATRIAL FIBRILLATION, CRYO N/A 01/24/2023    Procedure: Ablation, Atrial Fibrillation, Cryo;  Surgeon: Carlos Daugherty MD;  Location: [facility];  Service: Cardiology;  Laterality: N/A;  AF, PVI, CRYO, RAFAEL, CHANEL, GEN, DM, 3prep    APPENDECTOMY      HERNIA REPAIR      TRANSESOPHAGEAL ECHOCARDIOGRAPHY N/A 01/24/2023    Procedure: ECHOCARDIOGRAM, TRANSESOPHAGEAL;  Surgeon: Stacia Howell MD;  Location: [facility];  Service: Cardiology;  Laterality: N/A;       Review of patient's allergies indicates:  No Known Allergies    No current facility-administered medications on file prior to encounter.     Current Outpatient Medications on File Prior to Encounter   Medication Sig    aspirin (ECOTRIN) 81 MG EC tablet Take 81 mg by mouth once daily.    lamoTRIgine (LAMICTAL) 25 MG tablet TAKE 1 TABLET BY MOUTH DAILY FOR 14 DAYS, THEN INCREASE TO 2 TABLETS DAILY.    lisinopriL (PRINIVIL,ZESTRIL) 20 MG tablet Take 1 tablet (20 mg total) by mouth once daily. (Patient taking differently: Take 20 mg by mouth every evening.)    verapamiL (CALAN-SR) 120 MG CR tablet Take 1 tablet (120 mg total) by mouth every evening.    ALPRAZolam (XANAX) 0.5 MG tablet Take 1 tablet (0.5 mg total) by mouth once. Prior to MRI for 1 dose (Patient not taking: Reported on 6/29/2023)    cetirizine (ZYRTEC) 5 MG tablet Take 5 mg by mouth 2 (two) times daily as needed.    colchicine (COLCRYS) 0.6 mg tablet Take 2 tablets by mouth once, then take 1 tablet by mouth 1 hour later.  Then take 1 tablet by mouth daily until symptoms resolve (Patient not taking:  "Reported on 2023)    dexmethylphenidate (FOCALIN XR) 5 MG 24 hr capsule Take 5 mg by mouth every morning.    ergocalciferol, vitamin D2, (VITAMIN D ORAL) Take by mouth.    EScitalopram oxalate (LEXAPRO) 10 MG tablet Take 10 mg by mouth once daily.    LORazepam (ATIVAN) 0.5 MG tablet Take 0.5 mg by mouth 2 (two) times daily as needed.    MAGNESIUM ORAL Take by mouth.    methylPREDNISolone (MEDROL DOSEPACK) 4 mg tablet use as directed (Patient not taking: Reported on 2023)    needle, disp, 18 G 18 gauge x 1" Ndle For T injection    needle, disp, 21 G 21 gauge x 1 1/2" Ndle For T injection    SPRAVATO 84 mg (28 mg x 3) Spry Take 56 mg    syringe, disposable, (BD LUER-GILL SYRINGE) 3 mL Syrg For T injection    testosterone cypionate (DEPOTESTOTERONE CYPIONATE) 200 mg/mL injection Inject 1 mL (200 mg total) into the muscle every 14 (fourteen) days.    zinc 50 mg Tab Take by mouth.     Family History       Problem Relation (Age of Onset)    Anxiety disorder Mother    Cancer Maternal Grandfather    Depression Mother    No Known Problems Father, Sister, Brother, Maternal Aunt, Maternal Uncle, Paternal Aunt, Paternal Uncle, Maternal Grandmother, Paternal Grandmother, Paternal Grandfather          Tobacco Use    Smoking status: Former     Packs/day: 0.00     Years: 0.00     Pack years: 0.00     Types: Cigarettes     Quit date: 10/22/2004     Years since quittin.7    Smokeless tobacco: Never   Substance and Sexual Activity    Alcohol use: Not Currently     Comment: once a month; socially    Drug use: No    Sexual activity: Not Currently     Partners: Female     Birth control/protection: None     Review of Systems   Constitutional:  Negative for activity change, chills and fever.   HENT:  Negative for trouble swallowing.    Eyes:  Negative for photophobia and visual disturbance.   Respiratory:  Positive for cough and shortness of breath. Negative for chest tightness and wheezing.    Cardiovascular:  Positive for " leg swelling. Negative for chest pain and palpitations.   Gastrointestinal:  Negative for abdominal pain, constipation, diarrhea, nausea and vomiting.   Genitourinary:  Negative for dysuria, frequency, hematuria and urgency.   Musculoskeletal:  Positive for myalgias. Negative for arthralgias, back pain and gait problem.   Skin:  Negative for color change and rash.   Neurological:  Negative for dizziness, syncope, weakness, light-headedness, numbness and headaches.   Psychiatric/Behavioral:  Negative for agitation and confusion. The patient is not nervous/anxious.    Objective:     Vital Signs (Most Recent):  Temp: 98.9 °F (37.2 °C) (07/04/23 1828)  Pulse: 91 (07/04/23 2202)  Resp: 16 (07/04/23 1828)  BP: 126/85 (07/04/23 2202)  SpO2: 97 % (07/04/23 2202) Vital Signs (24h Range):  Temp:  [98.6 °F (37 °C)-98.9 °F (37.2 °C)] 98.9 °F (37.2 °C)  Pulse:  [79-92] 91  Resp:  [16] 16  SpO2:  [97 %-98 %] 97 %  BP: (122-133)/(75-85) 126/85     Weight: 108.9 kg (240 lb)  Body mass index is 35.44 kg/m².     Physical Exam  Vitals and nursing note reviewed.   Constitutional:       General: He is not in acute distress.     Appearance: He is well-developed.   HENT:      Head: Normocephalic and atraumatic.      Mouth/Throat:      Pharynx: No oropharyngeal exudate.   Eyes:      General: No scleral icterus.     Conjunctiva/sclera: Conjunctivae normal.   Cardiovascular:      Rate and Rhythm: Normal rate and regular rhythm.      Heart sounds: Normal heart sounds.   Pulmonary:      Effort: Pulmonary effort is normal. No respiratory distress.      Breath sounds: Normal breath sounds. No wheezing.   Abdominal:      General: Bowel sounds are normal. There is no distension.      Palpations: Abdomen is soft.      Tenderness: There is no abdominal tenderness.   Musculoskeletal:         General: Swelling (RLE) and tenderness (R calf) present. Normal range of motion.      Cervical back: Normal range of motion and neck supple.   Lymphadenopathy:       Cervical: No cervical adenopathy.   Skin:     General: Skin is warm and dry.      Capillary Refill: Capillary refill takes less than 2 seconds.      Findings: No rash.   Neurological:      Mental Status: He is alert and oriented to person, place, and time.      Cranial Nerves: No cranial nerve deficit.      Sensory: No sensory deficit.      Coordination: Coordination normal.   Psychiatric:         Behavior: Behavior normal.         Thought Content: Thought content normal.         Judgment: Judgment normal.              Significant Labs: All pertinent labs within the past 24 hours have been reviewed.  CBC:   Recent Labs   Lab 07/04/23 1821 07/04/23 2029   WBC 11.00 11.82   HGB 15.0 15.2   HCT 43.8 44.5    278     CMP:   Recent Labs   Lab 07/04/23 1821      K 4.3      CO2 25      BUN 16   CREATININE 0.9   CALCIUM 9.2   PROT 7.3   ALBUMIN 3.8   BILITOT 0.4   ALKPHOS 72   AST 20   ALT 31   ANIONGAP 11       Significant Imaging: I have reviewed all pertinent imaging results/findings within the past 24 hours.  CTA Chest Non-Coronary (PE Studies)  Narrative: EXAMINATION:  CTA CHEST NON CORONARY (PE STUDIES)    CLINICAL HISTORY:  Pulmonary embolism (PE) suspected, positive D-dimer;    TECHNIQUE:  Low dose axial images, sagittal and coronal reformations were obtained from the thoracic inlet to the lung bases.  75 cc Omnipaque 350 intravenous contrast administered according to PE protocol..    COMPARISON:  CTA 01/17/2023    FINDINGS:  Base of Neck: No significant abnormality.Thyroid appears within normal limits.    Thoracic soft tissues: Unremarkable.    Claudette/Mediastinum: No pathologic henry enlargement.    Heart: Normal size. No pericardial effusion.    Pulmonary vasculature: Right greater than left bilateral pulmonary emboli.  No large central type pulmonary embolus.  The most centrally located embolus on the right is noted in a segmental branch (2-217), otherwise, there are multiple  pulmonary emboli visualized through the subsegmental branches of the right pulmonary artery (for example 2-317, 2-268, 2-248),.  Small subsegmental embolus on the left (2-288).  Small focus of air noted in the main pulmonary artery, likely related to injection of contrast.    Aorta: Left-sided aortic arch with 3 branch vessels.  No aneurysm, dissection, and no significant atherosclerosis.    Trachea and Proximal airways: Patent.    Lungs/Pleura: Respiratory motion artifact at the lung bases.  Symmetrically expanded without consolidation or pneumothorax.  No pleural effusion..  Small calcified granuloma in the medial left upper lobe, unchanged from prior.    Esophagus: Normal course and caliber.    Upper Abdomen: Hepatic steatosis.    Bones: No acute fracture. No suspicious lytic or sclerotic lesions.  Age-appropriate degenerative changes.  Impression: Right greater than left bilateral pulmonary emboli, as detailed above, with the most central involving a segmental branch of the right pulmonary artery.    Hepatic steatosis.    The critical information above was relayed directly by Annabella Matthew by telephone to Irene Ladd MD on 7/4/2023 at 19:49.    This report was flagged in Epic as abnormal.    Electronically signed by resident: Annabella Matthew  Date:    07/04/2023  Time:    19:28    Electronically signed by: Wilbur Tolbert MD  Date:    07/04/2023  Time:    20:20  US Lower Extremity Veins Right  Narrative: EXAMINATION:  US LOWER EXTREMITY VEINS RIGHT    CLINICAL HISTORY:  Pain in right lower leg    TECHNIQUE:  Duplex and color flow Doppler evaluation and graded compression of the right lower extremity veins was performed.    COMPARISON:  None    FINDINGS:  Right thigh veins: The common femoral, femoral, popliteal, and upper greater saphenous are patent and free of thrombus. The veins are normally compressible and have normal phasic flow and augmentation response.    Right calf veins: The visualized calf veins  are patent.    Contralateral CFV: The contralateral (left) common femoral vein is patent and free of thrombus.    Miscellaneous: None  Impression: No evidence of deep venous thrombosis in the right lower extremity.    Electronically signed by resident: Cecelia Longoria  Date:    07/04/2023  Time:    18:02    Electronically signed by: Boston Ibrahim MD  Date:    07/04/2023  Time:    18:14

## 2023-07-05 NOTE — PLAN OF CARE
[facility] - Emergency Dept  Initial Discharge Assessment       Primary Care Provider: Ge Naranjo MD    Admission Diagnosis: Pulmonary embolism, unspecified chronicity, unspecified pulmonary embolism type, unspecified whether acute cor pulmonale present [I26.99]    Admission Date: 7/4/2023  Expected Discharge Date: 7/7/2023    Transition of Care Barriers: (P) None    Payor: MEDICAID / Plan: RF Code [facility] / Product Type: Managed Medicaid /     Extended Emergency Contact Information  Primary Emergency Contact: Sarah Beth Diop  Address: [address] [place], [place] [address] [address] [facility]  Home Phone: [phone]  Mobile Phone: [phone]  Relation: Spouse    Discharge Plan A: (P) Home with family  Discharge Plan B: (P) Home      [id number] - [place], [place] - [address]  [address]  [place] [address]  Phone: [phone] Fax: [phone]    [id number] - [place], [place] - [address] [address]  Phone: [phone] Fax: [phone]      Initial Assessment (most recent)       Adult Discharge Assessment - 07/05/23 0044          Discharge Assessment    Assessment Type Discharge Planning Assessment (P)      Confirmed/corrected address, phone number and insurance Yes (P)      Confirmed Demographics Correct on Facesheet (P)      Source of Information patient (P)      Does patient/caregiver understand observation status Yes (P)      Communicated CARIN with patient/caregiver Date not available/Unable to determine (P)      People in Home spouse (P)      Do you expect to return to your current living situation? Yes (P)      Do you have help at home or someone to help you manage your care at home? Yes (P)      Who are your caregiver(s) and their phone number(s)? Sarah Beth Diop  (P)      Prior to hospitilization cognitive status: Alert/Oriented (P)      Current cognitive status: Alert/Oriented (P)       Walking or Climbing Stairs -- (P)    pt stated he does not use any equipment, but pt endorsed difficulty walking due to leg pain    Home Accessibility wheelchair accessible (P)      Home Layout Able to live on 1st floor (P)      Equipment Currently Used at Home none (P)      Readmission within 30 days? No (P)      Patient currently being followed by outpatient case management? No (P)      Do you currently have service(s) that help you manage your care at home? No (P)      Do you take prescription medications? Yes (P)      Do you have prescription coverage? Yes (P)      Do you have any problems affording any of your prescribed medications? No (P)      Is the patient taking medications as prescribed? yes (P)      Who is going to help you get home at discharge? Sarah Beth Diop (P)      How do you get to doctors appointments? car, drives self (P)      Are you on dialysis? No (P)      Do you take coumadin? No (P)      Discharge Plan A Home with family (P)      Discharge Plan B Home (P)      DME Needed Upon Discharge  other (see comments) (P)    pending    Transition of Care Barriers None (P)         Physical Activity    On average, how many days per week do you engage in moderate to strenuous exercise (like a brisk walk)? 6 days (P)      On average, how many minutes do you engage in exercise at this level? 30 min (P)         Financial Resource Strain    How hard is it for you to pay for the very basics like food, housing, medical care, and heating? Not very hard (P)         Housing Stability    In the last 12 months, was there a time when you were not able to pay the mortgage or rent on time? No (P)      In the last 12 months, how many places have you lived? 1 (P)      In the last 12 months, was there a time when you did not have a steady place to sleep or slept in a shelter (including now)? No (P)         Transportation Needs    In the past 12 months, has lack of transportation kept you from medical appointments or  from getting medications? No (P)      In the past 12 months, has lack of transportation kept you from meetings, work, or from getting things needed for daily living? No (P)         Food Insecurity    Within the past 12 months, you worried that your food would run out before you got the money to buy more. Never true (P)      Within the past 12 months, the food you bought just didn't last and you didn't have money to get more. Never true (P)         Stress    Do you feel stress - tense, restless, nervous, or anxious, or unable to sleep at night because your mind is troubled all the time - these days? Only a little (P)         Social Connections    In a typical week, how many times do you talk on the phone with family, friends, or neighbors? More than three times a week (P)      How often do you get together with friends or relatives? More than three times a week (P)      How often do you attend Mandaeism or [employer]aritan services? Never (P)      Do you belong to any clubs or organizations such as Mandaeism groups, unions, fraternal or athletic groups, or school groups? No (P)      How often do you attend meetings of the clubs or organizations you belong to? Never (P)      Are you , , , , never , or living with a partner?  (P)         Alcohol Use    Q1: How often do you have a drink containing alcohol? Never (P)      Q2: How many drinks containing alcohol do you have on a typical day when you are drinking? Patient does not drink (P)      Q3: How often do you have six or more drinks on one occasion? Never (P)                         Gloria Navarrete LMSW  Case Management  Emergency Department  [phone]

## 2023-07-05 NOTE — PHARMACY MED REC
"  Admission Medication History     The home medication history was taken by Felix Lynch.    You may go to "Admission" then "Reconcile Home Medications" tabs to review and/or act upon these items.     The home medication list has been updated by the Pharmacy department.   Please read ALL comments highlighted in yellow.   Please address this information as you see fit.    Feel free to contact us if you have any questions or require assistance.      The medications listed below were removed from the home medication list. Please reorder if appropriate:  Patient reports no longer taking the following medication(s):  DEXMETHYLPHENIDATE 5 MG 24 HR CAP  ERGOCALCIFEROL, VIT D2 ORAL  MAGNESIUM ORAL  METHYLPREDNISOLONE DOSEPAK 4 MG  ZINC 50 MG TAB    Medications listed below were obtained from: Patient/family  PTA Medications   Medication Sig    acetaminophen (TYLENOL) 325 MG tablet   Take 650 mg by mouth daily as needed (Gout pain).    aspirin (ECOTRIN) 81 MG EC tablet   Take 81 mg by mouth once daily.    cetirizine (ZYRTEC) 5 MG tablet   Take 5 mg by mouth 2 (two) times daily as needed for Allergies.    colchicine (COLCRYS) 0.6 mg tablet   Take 0.6 mg by mouth every morning.    diphenhydrAMINE (BENADRYL ALLERGY) 25 mg tablet   Take 25 mg by mouth nightly as needed for Insomnia.    ibuprofen (ADVIL,MOTRIN) 200 MG tablet   Take 400 mg by mouth daily as needed (Gout pain (alternates w/Tylenol).    lamoTRIgine (LAMICTAL) 25 MG tablet   TAKE 2 TABLETS BY MOUTH DAILY.    lisinopriL (PRINIVIL,ZESTRIL) 20 MG tablet   Take 20 mg by mouth every evening.    LORazepam (ATIVAN) 0.5 MG tablet   Take 0.5 mg by mouth 2 (two) times daily as needed.    multivitamin (ONE DAILY MULTIVITAMIN) per tablet   Take 1 tablet by mouth once daily.    omega-3 fatty acids/fish oil (FISH OIL-OMEGA-3 FATTY ACIDS) 300-1,000 mg capsule   Take 1 capsule by mouth every morning.    SPRAVATO 84 mg (28 mg x 3) Spry   56 mg by Nasal route twice a week.    " "verapamiL (CALAN-SR) 120 MG CR tablet   Take 1 tablet (120 mg total) by mouth every evening.    needle, disp, 18 G 18 gauge x 1" Ndle   For T injection    needle, disp, 21 G 21 gauge x 1 1/2" Ndle   For T injection    syringe, disposable, (BD LUER-GILL SYRINGE) 3 mL Syrg   For T injection     Potential issues to be addressed PRIOR TO DISCHARGE  Patient reported not taking the following medications: (XANAX, DEPOSTESTOTERONE 200 MG). These medications remain on the home medication list. Please address accordingly.     Patient requires education regarding drug therapies           Felix Lynch  EXT [id number]                .        "

## 2023-07-06 VITALS
WEIGHT: 251.31 LBS | TEMPERATURE: 97 F | BODY MASS INDEX: 37.22 KG/M2 | HEIGHT: 69 IN | HEART RATE: 93 BPM | OXYGEN SATURATION: 97 % | SYSTOLIC BLOOD PRESSURE: 133 MMHG | RESPIRATION RATE: 18 BRPM | DIASTOLIC BLOOD PRESSURE: 80 MMHG

## 2023-07-06 LAB
ANION GAP SERPL CALC-SCNC: 13 MMOL/L (ref 8–16)
APTT PPP: 41.9 SEC (ref 21–32)
APTT PPP: 42.5 SEC (ref 21–32)
APTT PPP: 46.7 SEC (ref 21–32)
BASOPHILS # BLD AUTO: 0.05 K/UL (ref 0–0.2)
BASOPHILS # BLD AUTO: 0.05 K/UL (ref 0–0.2)
BASOPHILS NFR BLD: 0.5 % (ref 0–1.9)
BASOPHILS NFR BLD: 0.5 % (ref 0–1.9)
BUN SERPL-MCNC: 15 MG/DL (ref 6–20)
CALCIUM SERPL-MCNC: 9.2 MG/DL (ref 8.7–10.5)
CHLORIDE SERPL-SCNC: 102 MMOL/L (ref 95–110)
CO2 SERPL-SCNC: 23 MMOL/L (ref 23–29)
CREAT SERPL-MCNC: 0.8 MG/DL (ref 0.5–1.4)
DIFFERENTIAL METHOD: ABNORMAL
DIFFERENTIAL METHOD: ABNORMAL
EOSINOPHIL # BLD AUTO: 0.3 K/UL (ref 0–0.5)
EOSINOPHIL # BLD AUTO: 0.3 K/UL (ref 0–0.5)
EOSINOPHIL NFR BLD: 3 % (ref 0–8)
EOSINOPHIL NFR BLD: 3 % (ref 0–8)
ERYTHROCYTE [DISTWIDTH] IN BLOOD BY AUTOMATED COUNT: 12.6 % (ref 11.5–14.5)
ERYTHROCYTE [DISTWIDTH] IN BLOOD BY AUTOMATED COUNT: 12.6 % (ref 11.5–14.5)
EST. GFR  (NO RACE VARIABLE): >60 ML/MIN/1.73 M^2
GLUCOSE SERPL-MCNC: 107 MG/DL (ref 70–110)
HCT VFR BLD AUTO: 44.6 % (ref 40–54)
HCT VFR BLD AUTO: 44.6 % (ref 40–54)
HGB BLD-MCNC: 15.1 G/DL (ref 14–18)
HGB BLD-MCNC: 15.1 G/DL (ref 14–18)
IMM GRANULOCYTES # BLD AUTO: 0.03 K/UL (ref 0–0.04)
IMM GRANULOCYTES # BLD AUTO: 0.03 K/UL (ref 0–0.04)
IMM GRANULOCYTES NFR BLD AUTO: 0.3 % (ref 0–0.5)
IMM GRANULOCYTES NFR BLD AUTO: 0.3 % (ref 0–0.5)
LYMPHOCYTES # BLD AUTO: 3.7 K/UL (ref 1–4.8)
LYMPHOCYTES # BLD AUTO: 3.7 K/UL (ref 1–4.8)
LYMPHOCYTES NFR BLD: 37.7 % (ref 18–48)
LYMPHOCYTES NFR BLD: 37.7 % (ref 18–48)
MAGNESIUM SERPL-MCNC: 2.3 MG/DL (ref 1.6–2.6)
MCH RBC QN AUTO: 29.5 PG (ref 27–31)
MCH RBC QN AUTO: 29.5 PG (ref 27–31)
MCHC RBC AUTO-ENTMCNC: 33.9 G/DL (ref 32–36)
MCHC RBC AUTO-ENTMCNC: 33.9 G/DL (ref 32–36)
MCV RBC AUTO: 87 FL (ref 82–98)
MCV RBC AUTO: 87 FL (ref 82–98)
MONOCYTES # BLD AUTO: 1.1 K/UL (ref 0.3–1)
MONOCYTES # BLD AUTO: 1.1 K/UL (ref 0.3–1)
MONOCYTES NFR BLD: 11.4 % (ref 4–15)
MONOCYTES NFR BLD: 11.4 % (ref 4–15)
NEUTROPHILS # BLD AUTO: 4.6 K/UL (ref 1.8–7.7)
NEUTROPHILS # BLD AUTO: 4.6 K/UL (ref 1.8–7.7)
NEUTROPHILS NFR BLD: 47.1 % (ref 38–73)
NEUTROPHILS NFR BLD: 47.1 % (ref 38–73)
NRBC BLD-RTO: 0 /100 WBC
NRBC BLD-RTO: 0 /100 WBC
PHOSPHATE SERPL-MCNC: 4.4 MG/DL (ref 2.7–4.5)
PLATELET # BLD AUTO: 290 K/UL (ref 150–450)
PLATELET # BLD AUTO: 290 K/UL (ref 150–450)
PMV BLD AUTO: 9.7 FL (ref 9.2–12.9)
PMV BLD AUTO: 9.7 FL (ref 9.2–12.9)
POTASSIUM SERPL-SCNC: 4 MMOL/L (ref 3.5–5.1)
RBC # BLD AUTO: 5.11 M/UL (ref 4.6–6.2)
RBC # BLD AUTO: 5.11 M/UL (ref 4.6–6.2)
SODIUM SERPL-SCNC: 138 MMOL/L (ref 136–145)
WBC # BLD AUTO: 9.83 K/UL (ref 3.9–12.7)
WBC # BLD AUTO: 9.83 K/UL (ref 3.9–12.7)

## 2023-07-06 PROCEDURE — 83735 ASSAY OF MAGNESIUM: CPT | Performed by: PHYSICIAN ASSISTANT

## 2023-07-06 PROCEDURE — 85730 THROMBOPLASTIN TIME PARTIAL: CPT | Performed by: PHYSICIAN ASSISTANT

## 2023-07-06 PROCEDURE — 85025 COMPLETE CBC W/AUTO DIFF WBC: CPT | Performed by: PHYSICIAN ASSISTANT

## 2023-07-06 PROCEDURE — 25000003 PHARM REV CODE 250: Performed by: PHYSICIAN ASSISTANT

## 2023-07-06 PROCEDURE — 99239 HOSP IP/OBS DSCHRG MGMT >30: CPT | Mod: ,,,

## 2023-07-06 PROCEDURE — G0378 HOSPITAL OBSERVATION PER HR: HCPCS

## 2023-07-06 PROCEDURE — 36415 COLL VENOUS BLD VENIPUNCTURE: CPT | Performed by: PHYSICIAN ASSISTANT

## 2023-07-06 PROCEDURE — 25000003 PHARM REV CODE 250

## 2023-07-06 PROCEDURE — 80048 BASIC METABOLIC PNL TOTAL CA: CPT | Performed by: PHYSICIAN ASSISTANT

## 2023-07-06 PROCEDURE — 99239 PR HOSPITAL DISCHARGE DAY,>30 MIN: ICD-10-PCS | Mod: ,,,

## 2023-07-06 PROCEDURE — 96366 THER/PROPH/DIAG IV INF ADDON: CPT

## 2023-07-06 PROCEDURE — 84100 ASSAY OF PHOSPHORUS: CPT | Performed by: PHYSICIAN ASSISTANT

## 2023-07-06 PROCEDURE — 63600175 PHARM REV CODE 636 W HCPCS: Performed by: PHYSICIAN ASSISTANT

## 2023-07-06 PROCEDURE — 36415 COLL VENOUS BLD VENIPUNCTURE: CPT | Performed by: INTERNAL MEDICINE

## 2023-07-06 PROCEDURE — 94761 N-INVAS EAR/PLS OXIMETRY MLT: CPT

## 2023-07-06 PROCEDURE — 85730 THROMBOPLASTIN TIME PARTIAL: CPT | Mod: 91 | Performed by: INTERNAL MEDICINE

## 2023-07-06 RX ADMIN — LAMOTRIGINE 50 MG: 25 TABLET ORAL at 09:07

## 2023-07-06 RX ADMIN — HEPARIN SODIUM 20 UNITS/KG/HR: 10000 INJECTION, SOLUTION INTRAVENOUS at 12:07

## 2023-07-06 RX ADMIN — COLCHICINE 0.6 MG: 0.6 TABLET, FILM COATED ORAL at 09:07

## 2023-07-06 NOTE — PLAN OF CARE
Discharge Planning Case   Management Assessment Note:  Final note:    Patient admitted on 7-4-23  Chart reviewed  Care plan discussed with treatment team,    Current dispo: home today/    See PCP, cardiology and hem/onc as discussed  Transportation: has reliable  Consults following: case mgt  Case management  to follow    [facility] - [facility]ation [facility]  Discharge Final Note    Primary Care Provider: Ge Naranjo MD    Expected Discharge Date: 7/6/2023    Final Discharge Note (most recent)       Final Note - 07/06/23 1530          Final Note    Assessment Type Final Discharge Note (P)      Anticipated Discharge Disposition Home or Self Care (P)      Hospital Resources/Appts/Education Provided Provided patient/caregiver with written discharge plan information (P)         Post-Acute Status    Other Status No Post-Acute Service Needs (P)                      Important Message from Medicare             Contact Info       Ge Naranjo MD   Specialty: Internal Medicine   Relationship: PCP - General    [address] [place] [id number]   Phone: [phone]       Next Steps: Go to    Instructions: See Wilian Still on the [facility] clinic on 7-12-23 at 2:45 p.    [facility] - Emergency Dept   Specialty: Emergency Medicine    [address] [place] [id number]   Phone: [phone]       Next Steps: Go to    Instructions: If symptoms worsen    Mago Carreno NP   Specialty: Cardiology    [address] [place] [address]   Phone: [phone]       Next Steps: Schedule an appointment as soon as possible for a visit in 1 day(s)    Instructions: call and schedule a follow up appt.   see in 2-3 weeks    [facility] - Hem Onc [facility]   Specialty: Hematology and Oncology    [address] [place] [id number]   Phone: [phone]       Next Steps: Follow up

## 2023-07-07 ENCOUNTER — PATIENT MESSAGE (OUTPATIENT)
Dept: ELECTROPHYSIOLOGY | Facility: CLINIC | Age: 43
End: 2023-07-07
Payer: MEDICAID

## 2023-07-07 ENCOUNTER — PATIENT MESSAGE (OUTPATIENT)
Dept: PRIMARY CARE CLINIC | Facility: CLINIC | Age: 43
End: 2023-07-07
Payer: MEDICAID

## 2023-07-07 DIAGNOSIS — I48.0 PAROXYSMAL ATRIAL FIBRILLATION: ICD-10-CM

## 2023-07-07 DIAGNOSIS — I48.3 TYPICAL ATRIAL FLUTTER: Primary | ICD-10-CM

## 2023-07-07 DIAGNOSIS — I47.10 PSVT (PAROXYSMAL SUPRAVENTRICULAR TACHYCARDIA): ICD-10-CM

## 2023-07-07 NOTE — DISCHARGE SUMMARY
[facility] - Observation [address] [place] [address] Medicine  Discharge Summary      Patient Name: Wilbur Diop Jr.  MRN: [id number]  SUKUMAR: [id number]  Patient Class: OP- Observation  Admission Date: 7/4/2023  Hospital Length of Stay: 0 days  Discharge Date and Time:  07/07/2023 11:50 AM  Attending Physician: Mayi att. providers found   Discharging Provider: Yoselin Taylor PA-C  Primary Care Provider: Ge Naranjo MD  [facility] Medicine Team: [facility] HOSP MED E Yoselin Taylor PA-C  Primary Care Team: [facility] HOSP MED E    HPI:   Wilbur Diop Jr. Is a 43 y.o. male with a PMHx of atrial fibrillation (s/p ablation 01/2023), anxiety/depression who presents to [facility] for evaluation of shortness of breath and right calf pain. Patient reports worsening shortness of breath and cough for the past 3 weeks. Shortness of breath occurs with minimal exertion, no significant SOB at rest. He then developed right calf pain and swelling 4 days ago after driving 14 hours to [place]. The pain and swelling worsened today after returning home so he presented to the ED for further evalition. He was previously on xarelto for Afib but this was stopped 1-2 weeks ago as advised by his electrophysiologist and is currently on taking ASA. Denies fever, chills, chest pain, congestion, HA, numbness/tingling, vision changes or syncope.     ED: AFVSS. No leukocytosis or electrolyte abnormalities. D-dimer 1.25. RLE US negative for DVT. CTA chest shows bilateral pulmonary emboli without RH strain. Started on heparin gtt.       * No surgery found *      Hospital Course:   Wilbur Diop is 43 y.o. male admitted to observation for bilateral PE. LE US w/o DVT. CTA confirmed PE. Started on heparin gtt. Patient's symptoms of SOB and cough resolved. LE tenderness persists, but repeat dopper US without DVT. Transitioned to therapeutic oral AC with Xarelto 15 mg BID x21 days followed by 20 mg nightly to complete 6-month course. Discontinue testosterone injections  given risk of clotting. Encouraged physical activity. Follow up with hematology clinic to further assess patient's risk. Patient discharged in stable condition. All questions answered.        Goals of Care Treatment Preferences:  Code Status: Full Code      Consults:     Cardiac/Vascular  Paroxysmal atrial fibrillation  Status post catheter ablation of atrial flutter  - taken off xarelto 1 wk ago s/p ablation  - heparin for AC for now --> restart xarelto at therapeutic dosage to complete 6 month course  - continue verapamil   - follow up with hematology, cardiology    Hematology  * Bilateral pulmonary embolism  - satting 98% on RA  - EKG without acute changes  - LE US negative for DVT  - CTA chest with bilateral PE but no evidence of RH strain  - continue heparin gtt- transition to oral medication tomorrow  - discharge on xarelto 15 mg BID x 21 days, followed by 20 mg every evening for 6 months.  - monitor tele  - increased risk of VTE given testosterone use and recent travel but warrants hypercoagulability workup given symptoms began while on xarelto   --> discontinue testosterone for now given clotting risk   - referral to heme/onc on discharge    Results for orders placed during the hospital encounter of 07/04/23    Echo    Interpretation Summary  · The left ventricle is normal in size with normal systolic function.  · The estimated ejection fraction is 63%.  · Normal left ventricular diastolic function.  · Normal right ventricular size with normal right ventricular systolic function.  · Mild right atrial enlargement.  · Intermediate central venous pressure (8 mmHg).  · The estimated PA systolic pressure is 24 mmHg.    Endocrine  Hypogonadism in male  - on Testosterone injections q2wks as outpatient, will need to be AR'ed      Final Active Diagnoses:    Diagnosis Date Noted POA    PRINCIPAL PROBLEM:  Bilateral pulmonary embolism [I26.99] 07/04/2023 Yes    Hypogonadism in male [E29.1] 12/12/2022 Yes     Paroxysmal atrial fibrillation [I48.0] 10/21/2022 Yes    Status post catheter ablation of atrial flutter [Z98.890] 06/17/2022 Not Applicable    RACHEL (generalized anxiety disorder) [F41.1] 11/18/2021 Yes      Problems Resolved During this Admission:       Discharged Condition: good    Disposition: Home or Self Care    Follow Up:   Follow-up Information     Ge Naranjo MD. Go to.    Specialty: Internal Medicine  Why: See Wilian Still DO, on the [facility] on 7-12-23 at 2:45 p.  Contact information:  [address]  [place] [address]  [phone]             [facility] - Emergency Dept. Go to.    Specialty: Emergency Medicine  Why: If symptoms worsen  Contact information:  [address] [facility] [address] [phone]           Mago Carreno NP. Schedule an appointment as soon as possible for a visit in 1 day(s).    Specialty: Cardiology  Why: call and schedule a follow up appt.   see in 2-3 weeks  Contact information:  [address] [facility] [id number] [phone]             [facility] - Hem Onc [facility] Follow up.    Specialty: Hematology and Oncology  Contact information:  [address] [facility] [address] [phone]  Additional information:  Please park in the [facility] surface lot on the [facility]. side. Check in on the 2nd floor.                     Patient Instructions:      Ambulatory referral/consult to Hematology / Oncology   Standing Status: Future   Referral Priority: Routine Referral Type: Consultation   Referral Reason: Specialty Services Required   Requested Specialty: Hematology and Oncology   Number of Visits Requested: 1     Activity as tolerated           Pending Diagnostic Studies:     None         Medications:  Reconciled Home Medications:      Medication List      START taking these medications    XARELTO 15 mg Tab  Generic drug: rivaroxaban  Take 1 tablet (15 mg total) by mouth 2 (two) times daily with meals for 21  "days. Last dose 7/26/2023.        CHANGE how you take these medications    colchicine 0.6 mg tablet  Commonly known as: COLCRYS  Take 2 tablets by mouth once, then take 1 tablet by mouth 1 hour later.  Then take 1 tablet by mouth daily until symptoms resolve  What changed:   · how much to take  · how to take this  · when to take this  · additional instructions     lisinopriL 20 MG tablet  Commonly known as: PRINIVIL,ZESTRIL  Take 1 tablet (20 mg total) by mouth once daily.  What changed: when to take this        CONTINUE taking these medications    acetaminophen 325 MG tablet  Commonly known as: TYLENOL  Take 650 mg by mouth daily as needed (Gout pain).     ALPRAZolam 0.5 MG tablet  Commonly known as: XANAX  Take 1 tablet (0.5 mg total) by mouth once. Prior to MRI for 1 dose     BENADRYL ALLERGY 25 mg tablet  Generic drug: diphenhydrAMINE  Take 25 mg by mouth nightly as needed for Insomnia.     cetirizine 5 MG tablet  Commonly known as: ZYRTEC  Take 5 mg by mouth 2 (two) times daily as needed for Allergies.     fish oil-omega-3 fatty acids 300-1,000 mg capsule  Take 1 capsule by mouth every morning.     ibuprofen 200 MG tablet  Commonly known as: ADVIL,MOTRIN  Take 400 mg by mouth daily as needed (Gout pain (alternates w/Tylenol)).     lamoTRIgine 25 MG tablet  Commonly known as: LAMICTAL  Take 50 mg by mouth once daily.     LORazepam 0.5 MG tablet  Commonly known as: ATIVAN  Take 0.5 mg by mouth 2 (two) times daily as needed.     * needle (disp) 21 G 21 gauge x 1 1/2" Ndle  For T injection     * needle (disp) 18 G 18 gauge x 1" Ndle  For T injection     ONE DAILY MULTIVITAMIN per tablet  Generic drug: multivitamin  Take 1 tablet by mouth once daily.     SPRAVATO 84 mg (28 mg x 3) Spry  Generic drug: esketamine  56 mg by Nasal route twice a week.     syringe (disposable) 3 mL Syrg  Commonly known as: BD LUER-GILL SYRINGE  For T injection     verapamiL 120 MG CR tablet  Commonly known as: CALAN-SR  Take 1 tablet (120 " mg total) by mouth every evening.         * This list has 2 medication(s) that are the same as other medications prescribed for you. Read the directions carefully, and ask your doctor or other care provider to review them with you.            STOP taking these medications    aspirin 81 MG EC tablet  Commonly known as: ECOTRIN     testosterone cypionate 200 mg/mL injection  Commonly known as: DEPOTESTOTERONE CYPIONATE            Indwelling Lines/Drains at time of discharge:   Lines/Drains/Airways     None                 Time spent on the discharge of patient: 36 minutes         Yoselin Taylor PA-C  Department of Hospital Medicine  [facility] - Observation 11H

## 2023-07-07 NOTE — ASSESSMENT & PLAN NOTE
Status post catheter ablation of atrial flutter  - taken off xarelto 1 wk ago s/p ablation  - heparin for AC for now --> restart xarelto at therapeutic dosage to complete 6 month course  - continue verapamil   - follow up with hematology, cardiology

## 2023-07-07 NOTE — ASSESSMENT & PLAN NOTE
- satting 98% on RA  - EKG without acute changes  - LE US negative for DVT  - CTA chest with bilateral PE but no evidence of RH strain  - continue heparin gtt- transition to oral medication tomorrow  - discharge on xarelto 15 mg BID x 21 days, followed by 20 mg every evening for 6 months.  - monitor tele  - increased risk of VTE given testosterone use and recent travel but warrants hypercoagulability workup given symptoms began while on xarelto   --> discontinue testosterone for now given clotting risk   - referral to heme/onc on discharge    Results for orders placed during the hospital encounter of 07/04/23    Echo    Interpretation Summary  · The left ventricle is normal in size with normal systolic function.  · The estimated ejection fraction is 63%.  · Normal left ventricular diastolic function.  · Normal right ventricular size with normal right ventricular systolic function.  · Mild right atrial enlargement.  · Intermediate central venous pressure (8 mmHg).  · The estimated PA systolic pressure is 24 mmHg.

## 2023-07-11 ENCOUNTER — TELEPHONE (OUTPATIENT)
Dept: HEMATOLOGY/ONCOLOGY | Facility: CLINIC | Age: 43
End: 2023-07-11
Payer: MEDICAID

## 2023-07-11 ENCOUNTER — OFFICE VISIT (OUTPATIENT)
Dept: HEMATOLOGY/ONCOLOGY | Facility: CLINIC | Age: 43
End: 2023-07-11
Payer: MEDICAID

## 2023-07-11 VITALS
OXYGEN SATURATION: 98 % | RESPIRATION RATE: 18 BRPM | BODY MASS INDEX: 35.62 KG/M2 | DIASTOLIC BLOOD PRESSURE: 86 MMHG | HEIGHT: 69 IN | HEART RATE: 84 BPM | SYSTOLIC BLOOD PRESSURE: 136 MMHG | WEIGHT: 240.5 LBS

## 2023-07-11 DIAGNOSIS — I26.99 PULMONARY EMBOLISM, UNSPECIFIED CHRONICITY, UNSPECIFIED PULMONARY EMBOLISM TYPE, UNSPECIFIED WHETHER ACUTE COR PULMONALE PRESENT: ICD-10-CM

## 2023-07-11 DIAGNOSIS — D68.59 THROMBOPHILIA: Primary | ICD-10-CM

## 2023-07-11 DIAGNOSIS — I48.3 TYPICAL ATRIAL FLUTTER: ICD-10-CM

## 2023-07-11 PROCEDURE — 3008F BODY MASS INDEX DOCD: CPT | Mod: CPTII,,, | Performed by: INTERNAL MEDICINE

## 2023-07-11 PROCEDURE — 1159F MED LIST DOCD IN RCRD: CPT | Mod: CPTII,,, | Performed by: INTERNAL MEDICINE

## 2023-07-11 PROCEDURE — 3079F DIAST BP 80-89 MM HG: CPT | Mod: CPTII,,, | Performed by: INTERNAL MEDICINE

## 2023-07-11 PROCEDURE — 99417 PR PROLONGED SVC, OUTPT, W/WO DIRECT PT CONTACT,  EA ADDTL 15 MIN: ICD-10-PCS | Mod: S$PBB,,, | Performed by: INTERNAL MEDICINE

## 2023-07-11 PROCEDURE — 3075F SYST BP GE 130 - 139MM HG: CPT | Mod: CPTII,,, | Performed by: INTERNAL MEDICINE

## 2023-07-11 PROCEDURE — 3008F PR BODY MASS INDEX (BMI) DOCUMENTED: ICD-10-PCS | Mod: CPTII,,, | Performed by: INTERNAL MEDICINE

## 2023-07-11 PROCEDURE — 3044F PR MOST RECENT HEMOGLOBIN A1C LEVEL <7.0%: ICD-10-PCS | Mod: CPTII,,, | Performed by: INTERNAL MEDICINE

## 2023-07-11 PROCEDURE — 4010F ACE/ARB THERAPY RXD/TAKEN: CPT | Mod: CPTII,,, | Performed by: INTERNAL MEDICINE

## 2023-07-11 PROCEDURE — 99214 OFFICE O/P EST MOD 30 MIN: CPT | Mod: PBBFAC,PO | Performed by: INTERNAL MEDICINE

## 2023-07-11 PROCEDURE — 3079F PR MOST RECENT DIASTOLIC BLOOD PRESSURE 80-89 MM HG: ICD-10-PCS | Mod: CPTII,,, | Performed by: INTERNAL MEDICINE

## 2023-07-11 PROCEDURE — 3075F PR MOST RECENT SYSTOLIC BLOOD PRESS GE 130-139MM HG: ICD-10-PCS | Mod: CPTII,,, | Performed by: INTERNAL MEDICINE

## 2023-07-11 PROCEDURE — 1159F PR MEDICATION LIST DOCUMENTED IN MEDICAL RECORD: ICD-10-PCS | Mod: CPTII,,, | Performed by: INTERNAL MEDICINE

## 2023-07-11 PROCEDURE — 99215 PR OFFICE/OUTPT VISIT, EST, LEVL V, 40-54 MIN: ICD-10-PCS | Mod: S$PBB,,, | Performed by: INTERNAL MEDICINE

## 2023-07-11 PROCEDURE — 99215 OFFICE O/P EST HI 40 MIN: CPT | Mod: S$PBB,,, | Performed by: INTERNAL MEDICINE

## 2023-07-11 PROCEDURE — 99999 PR PBB SHADOW E&M-EST. PATIENT-LVL IV: CPT | Mod: PBBFAC,,, | Performed by: INTERNAL MEDICINE

## 2023-07-11 PROCEDURE — 4010F PR ACE/ARB THEARPY RXD/TAKEN: ICD-10-PCS | Mod: CPTII,,, | Performed by: INTERNAL MEDICINE

## 2023-07-11 PROCEDURE — 3044F HG A1C LEVEL LT 7.0%: CPT | Mod: CPTII,,, | Performed by: INTERNAL MEDICINE

## 2023-07-11 PROCEDURE — 99999 PR PBB SHADOW E&M-EST. PATIENT-LVL IV: ICD-10-PCS | Mod: PBBFAC,,, | Performed by: INTERNAL MEDICINE

## 2023-07-11 PROCEDURE — 99417 PROLNG OP E/M EACH 15 MIN: CPT | Mod: S$PBB,,, | Performed by: INTERNAL MEDICINE

## 2023-07-11 RX ORDER — ACETAMINOPHEN 500 MG
5000 TABLET ORAL DAILY
COMMUNITY

## 2023-07-11 RX ORDER — DEXMETHYLPHENIDATE HYDROCHLORIDE 5 MG/1
10 CAPSULE, EXTENDED RELEASE ORAL
COMMUNITY
End: 2023-12-04

## 2023-07-11 RX ORDER — MAGNESIUM GLYCINATE 100 MG
2 CAPSULE ORAL DAILY
COMMUNITY

## 2023-07-11 NOTE — TELEPHONE ENCOUNTER
----- Message from Ricky Rojas sent at 7/11/2023  3:40 PM CDT -----  Contact: pt  Type: Requesting to speak with nurse        Who Called: PT  Regarding: running late   Would the patient rather a call back or a response via [employer]? Call back  Best Call Back Number: [phone]  Additional Information:

## 2023-07-11 NOTE — LETTER
July 11, 2023        Wilbur Diop Jr.  [address]  [place] [address]             [facility]  [address]  [facility] [address]  Phone: [phone]   Patient: Wilbur Diop Jr.   MR Number: [id number]   YOB: 1980   Date of Visit: 7/11/2023     To Whom it May Concern;    I have evaluated Mr. Diop in clinic today. I see no medical contraindication to resuming Spravato therapy, and he may resume at any time. Please feel free to contact me if you have questions.      Sincerely,          Quique Krishnan MD

## 2023-07-12 ENCOUNTER — LAB VISIT (OUTPATIENT)
Dept: LAB | Facility: HOSPITAL | Age: 43
End: 2023-07-12
Attending: INTERNAL MEDICINE
Payer: MEDICAID

## 2023-07-12 ENCOUNTER — OFFICE VISIT (OUTPATIENT)
Dept: INTERNAL MEDICINE | Facility: CLINIC | Age: 43
End: 2023-07-12
Payer: MEDICAID

## 2023-07-12 VITALS — HEIGHT: 69 IN | WEIGHT: 242.31 LBS | BODY MASS INDEX: 35.89 KG/M2

## 2023-07-12 DIAGNOSIS — I26.99 BILATERAL PULMONARY EMBOLISM: Primary | ICD-10-CM

## 2023-07-12 DIAGNOSIS — I26.99 PULMONARY EMBOLISM, UNSPECIFIED CHRONICITY, UNSPECIFIED PULMONARY EMBOLISM TYPE, UNSPECIFIED WHETHER ACUTE COR PULMONALE PRESENT: ICD-10-CM

## 2023-07-12 DIAGNOSIS — D68.59 THROMBOPHILIA: ICD-10-CM

## 2023-07-12 LAB
D DIMER PPP IA.FEU-MCNC: 0.87 MG/L FEU
HCYS SERPL-SCNC: 10.4 UMOL/L (ref 4–16.5)

## 2023-07-12 PROCEDURE — 85730 THROMBOPLASTIN TIME PARTIAL: CPT | Performed by: INTERNAL MEDICINE

## 2023-07-12 PROCEDURE — 4010F PR ACE/ARB THEARPY RXD/TAKEN: ICD-10-PCS | Mod: CPTII,,,

## 2023-07-12 PROCEDURE — 85303 CLOT INHIBIT PROT C ACTIVITY: CPT | Performed by: INTERNAL MEDICINE

## 2023-07-12 PROCEDURE — 3044F PR MOST RECENT HEMOGLOBIN A1C LEVEL <7.0%: ICD-10-PCS | Mod: CPTII,,,

## 2023-07-12 PROCEDURE — 99212 OFFICE O/P EST SF 10 MIN: CPT | Mod: PBBFAC

## 2023-07-12 PROCEDURE — 83090 ASSAY OF HOMOCYSTEINE: CPT | Performed by: INTERNAL MEDICINE

## 2023-07-12 PROCEDURE — 85379 FIBRIN DEGRADATION QUANT: CPT | Performed by: INTERNAL MEDICINE

## 2023-07-12 PROCEDURE — 1160F RVW MEDS BY RX/DR IN RCRD: CPT | Mod: CPTII,,,

## 2023-07-12 PROCEDURE — 85306 CLOT INHIBIT PROT S FREE: CPT | Performed by: INTERNAL MEDICINE

## 2023-07-12 PROCEDURE — 3008F BODY MASS INDEX DOCD: CPT | Mod: CPTII,,,

## 2023-07-12 PROCEDURE — 1159F PR MEDICATION LIST DOCUMENTED IN MEDICAL RECORD: ICD-10-PCS | Mod: CPTII,,,

## 2023-07-12 PROCEDURE — 36415 COLL VENOUS BLD VENIPUNCTURE: CPT | Mod: PO | Performed by: INTERNAL MEDICINE

## 2023-07-12 PROCEDURE — 85670 THROMBIN TIME PLASMA: CPT | Performed by: INTERNAL MEDICINE

## 2023-07-12 PROCEDURE — 4010F ACE/ARB THERAPY RXD/TAKEN: CPT | Mod: CPTII,,,

## 2023-07-12 PROCEDURE — 99999 PR PBB SHADOW E&M-EST. PATIENT-LVL II: CPT | Mod: PBBFAC,,,

## 2023-07-12 PROCEDURE — 1159F MED LIST DOCD IN RCRD: CPT | Mod: CPTII,,,

## 2023-07-12 PROCEDURE — 86146 BETA-2 GLYCOPROTEIN ANTIBODY: CPT | Performed by: INTERNAL MEDICINE

## 2023-07-12 PROCEDURE — 86148 ANTI-PHOSPHOLIPID ANTIBODY: CPT | Mod: 91 | Performed by: INTERNAL MEDICINE

## 2023-07-12 PROCEDURE — 99999 PR PBB SHADOW E&M-EST. PATIENT-LVL II: ICD-10-PCS | Mod: PBBFAC,,,

## 2023-07-12 PROCEDURE — 85300 ANTITHROMBIN III ACTIVITY: CPT | Performed by: INTERNAL MEDICINE

## 2023-07-12 PROCEDURE — 86147 CARDIOLIPIN ANTIBODY EA IG: CPT | Mod: 59 | Performed by: INTERNAL MEDICINE

## 2023-07-12 PROCEDURE — 99213 OFFICE O/P EST LOW 20 MIN: CPT | Mod: S$PBB,,,

## 2023-07-12 PROCEDURE — 85613 RUSSELL VIPER VENOM DILUTED: CPT | Performed by: INTERNAL MEDICINE

## 2023-07-12 PROCEDURE — 1160F PR REVIEW ALL MEDS BY PRESCRIBER/CLIN PHARMACIST DOCUMENTED: ICD-10-PCS | Mod: CPTII,,,

## 2023-07-12 PROCEDURE — 99213 PR OFFICE/OUTPT VISIT, EST, LEVL III, 20-29 MIN: ICD-10-PCS | Mod: S$PBB,,,

## 2023-07-12 PROCEDURE — 85732 THROMBOPLASTIN TIME PARTIAL: CPT | Performed by: INTERNAL MEDICINE

## 2023-07-12 PROCEDURE — 3044F HG A1C LEVEL LT 7.0%: CPT | Mod: CPTII,,,

## 2023-07-12 PROCEDURE — 3008F PR BODY MASS INDEX (BMI) DOCUMENTED: ICD-10-PCS | Mod: CPTII,,,

## 2023-07-12 PROCEDURE — 85613 RUSSELL VIPER VENOM DILUTED: CPT | Mod: 91 | Performed by: INTERNAL MEDICINE

## 2023-07-12 NOTE — PROGRESS NOTES
Clinic Note  7/12/2023      Subjective:       Patient ID:  Wilbur is a 43 y.o. male being seen for hospital follow up.       He was admitted for observation on 7/7 for BL PE without cor pulmonale. During this admission he was transitioned from heparin to Eliquis and has been able to fill his Rx and has been taking it as prescribed. He was previously on DOAC for atrial fibrillation but was discontinued following an ablation but reports he was experiencing cough prior to this discontinuation. He was also taking Testosterone injections and was recommended to discontinue. He was able to see hematology yesterday for hypercoagulable workup. He reports he is still experiencing cough but states it is improving from prior to the admission.     He was able to work out today and was able to complete 20 minutes of treadmill that was asymptomatic but noted on his watch/cardiac monitor and that resolved after rest.     He denies any new or worsening SOB, hemoptysis, N/V, bleeding, melena.           Past Medical History:   Diagnosis Date    ADHD (attention deficit hyperactivity disorder)     Allergy     Anxiety     Atrial fibrillation     Atrial flutter     Depression     History of psychiatric hospitalization     2002 for depression    Hx of psychiatric care     Psychiatric problem     Supraventricular tachycardia     Therapy        Past Surgical History:   Procedure Laterality Date    ABLATION, ATRIAL FIBRILLATION, CRYO N/A 01/24/2023    Procedure: Ablation, Atrial Fibrillation, Cryo;  Surgeon: Carlos Daugherty MD;  Location: [facility] EP LAB;  Service: Cardiology;  Laterality: N/A;  AF, PVI, CRYO, RAFAEL, CHANEL, GEN, DM, 3prep    APPENDECTOMY      HERNIA REPAIR      TRANSESOPHAGEAL ECHOCARDIOGRAPHY N/A 01/24/2023    Procedure: ECHOCARDIOGRAM, TRANSESOPHAGEAL;  Surgeon: Stacia Howell MD;  Location: [facility] EP LAB;  Service: Cardiology;  Laterality: N/A;       Family History   Problem Relation Age of Onset    Depression Mother      Anxiety disorder Mother     No Known Problems Father     No Known Problems Sister     No Known Problems Brother     No Known Problems Maternal Aunt     No Known Problems Maternal Uncle     No Known Problems Paternal Aunt     No Known Problems Paternal Uncle     No Known Problems Maternal Grandmother     Cancer Maternal Grandfather         Prostate cancer recovered    No Known Problems Paternal Grandmother     No Known Problems Paternal Grandfather     Amblyopia Neg Hx     Blindness Neg Hx     Cataracts Neg Hx     Diabetes Neg Hx     Glaucoma Neg Hx     Hypertension Neg Hx     Macular degeneration Neg Hx     Retinal detachment Neg Hx     Strabismus Neg Hx     Stroke Neg Hx     Thyroid disease Neg Hx        Social History     Socioeconomic History    Marital status:     Number of children: 2   Tobacco Use    Smoking status: Former     Packs/day: 0.00     Years: 0.00     Pack years: 0.00     Types: Cigarettes     Quit date: 10/22/2004     Years since quittin.7    Smokeless tobacco: Never   Substance and Sexual Activity    Alcohol use: Not Currently     Comment: once a month; socially    Drug use: No    Sexual activity: Not Currently     Partners: Female     Birth control/protection: None   Other Topics Concern    Patient feels they ought to cut down on drinking/drug use No    Patient annoyed by others criticizing their drinking/drug use No    Patient has felt bad or guilty about drinking/drug use No    Patient has had a drink/used drugs as an eye opener in the AM No     Social Determinants of Health     Financial Resource Strain: Low Risk     Difficulty of Paying Living Expenses: Not very hard   Food Insecurity: No Food Insecurity    Worried About Running Out of Food in the Last Year: Never true    Ran Out of Food in the Last Year: Never true   Transportation Needs: No Transportation Needs    Lack of Transportation (Medical): No    Lack of Transportation (Non-Medical): No   Physical Activity: Sufficiently  Active    Days of Exercise per Week: 6 days    Minutes of Exercise per Session: 30 min   Stress: No Stress Concern Present    Feeling of Stress : Only a little   Social Connections: Moderately Isolated    Frequency of Communication with Friends and Family: More than three times a week    Frequency of Social Gatherings with Friends and Family: More than three times a week    Attends Congregation Services: Never    Active Member of Clubs or Organizations: No    Attends Club or Organization Meetings: Never    Marital Status:    Housing Stability: Low Risk     Unable to Pay for Housing in the Last Year: No    Number of Places Lived in the Last Year: 1    Unstable Housing in the Last Year: No       Review of Systems   Constitutional:  Negative for chills, diaphoresis, fever and weight loss.   HENT:  Negative for congestion, hearing loss, sinus pain and sore throat.    Eyes:  Negative for blurred vision, double vision and photophobia.   Respiratory:  Positive for cough. Negative for shortness of breath and wheezing.    Cardiovascular:  Negative for chest pain, palpitations, orthopnea, leg swelling and PND.   Gastrointestinal:  Negative for abdominal pain, constipation, diarrhea, nausea and vomiting.   Genitourinary:  Negative for dysuria, flank pain and hematuria.   Musculoskeletal:  Negative for falls, joint pain and neck pain.   Skin:  Negative for rash.   Neurological:  Negative for dizziness, tingling, sensory change and headaches.     Medication List with Changes/Refills   New Medications    DEXMETHYLPHENIDATE (FOCALIN XR) 10 MG 24 HR CAPSULE    Take 1 capsule once a day in the morning.   Current Medications    ACETAMINOPHEN (TYLENOL) 325 MG TABLET    Take 650 mg by mouth daily as needed (Gout pain).    CHOLECALCIFEROL, VITAMIN D3, 125 MCG (5,000 UNIT) TAB    Take 5,000 Units by mouth once daily.    DEXMETHYLPHENIDATE (FOCALIN XR) 5 MG 24 HR CAPSULE    Take 10 mg by mouth.    LAMOTRIGINE (LAMICTAL) 25 MG TABLET    " Take 50 mg by mouth once daily.    LISINOPRIL (PRINIVIL,ZESTRIL) 20 MG TABLET    Take 1 tablet (20 mg total) by mouth once daily.    LORAZEPAM (ATIVAN) 0.5 MG TABLET    Take 0.5 mg by mouth 2 (two) times daily as needed.    MAGNESIUM GLYCINATE-MAG OXIDE 120 MG MAGNESIUM CAP    Take 2 capsules by mouth once daily.    MULTIVITAMIN (THERAGRAN) PER TABLET    Take 1 tablet by mouth once daily.    OMEGA-3 FATTY ACIDS/FISH OIL (FISH OIL-OMEGA-3 FATTY ACIDS) 300-1,000 MG CAPSULE    Take 1 capsule by mouth every morning.    RIVAROXABAN (XARELTO) 15 MG TAB    Take 1 tablet (15 mg total) by mouth 2 (two) times daily with meals for 21 days. Last dose 7/26/2023.    RIVAROXABAN (XARELTO) 20 MG TAB    Take 1 tablet (20 mg total) by mouth daily with dinner or evening meal. Starting on 7/27/2023, take one 20 mg tablet every evening with dinner. Last dose 12/21/2023.    SPRAVATO 84 MG (28 MG X 3) SPRY    56 mg by Nasal route twice a week.    VERAPAMIL (CALAN-SR) 120 MG CR TABLET    Take 1 tablet (120 mg total) by mouth every evening.       Patient Active Problem List   Diagnosis    Recurrent major depressive disorder, in partial remission    RACHEL (generalized anxiety disorder)    Typical atrial flutter    PSVT (paroxysmal supraventricular tachycardia)    Status post catheter ablation of atrial flutter    Paroxysmal atrial fibrillation    Impaired fasting glucose    Hypogonadism in male    Prediabetes    Bilateral pulmonary embolism               Objective:      Ht 5' 9" (1.753 m)   Wt 109.9 kg (242 lb 4.6 oz)   BMI 35.78 kg/m²   Estimated body mass index is 35.78 kg/m² as calculated from the following:    Height as of this encounter: 5' 9" (1.753 m).    Weight as of this encounter: 109.9 kg (242 lb 4.6 oz).      Physical Exam  Constitutional:       Appearance: Normal appearance. He is normal weight.   HENT:      Head: Atraumatic.      Right Ear: External ear normal.      Left Ear: External ear normal.      Nose: Nose normal. No " congestion or rhinorrhea.      Mouth/Throat:      Mouth: Mucous membranes are moist.      Pharynx: Oropharynx is clear.   Eyes:      General: No scleral icterus.     Extraocular Movements: Extraocular movements intact.      Conjunctiva/sclera: Conjunctivae normal.   Cardiovascular:      Rate and Rhythm: Normal rate and regular rhythm.      Pulses: Normal pulses.      Heart sounds: Normal heart sounds. No murmur heard.    No gallop.   Pulmonary:      Effort: Pulmonary effort is normal.      Breath sounds: Normal breath sounds. No wheezing or rales.   Abdominal:      General: Abdomen is flat. There is no distension.      Palpations: Abdomen is soft.      Tenderness: There is no abdominal tenderness.   Musculoskeletal:         General: No swelling or tenderness. Normal range of motion.      Cervical back: Normal range of motion and neck supple. No tenderness.      Right lower leg: No edema.      Left lower leg: No edema.   Skin:     General: Skin is warm and dry.      Capillary Refill: Capillary refill takes less than 2 seconds.      Coloration: Skin is not jaundiced.      Findings: No rash.   Neurological:      General: No focal deficit present.      Mental Status: He is alert and oriented to person, place, and time. Mental status is at baseline.   Psychiatric:         Mood and Affect: Mood normal.         Behavior: Behavior normal.         Assessment and Plan:   44yo M with h/o of paroxysmal AF s/p PVI and ablation presenting for hospital follow up after an admission for bilateral segmental PE. Reports improvement in cough that was present prior to admission. No hemoptysis. Patient able to work out on treadmill for 20 mins following discharge with heart rate reaching age appropriate rate of 130 and recovering during rest. No SANTA or hemoptysis. Already seen by heme clinic and had hypercoagulable workup initiated. Currently on Xarelto with good adherence and no issues with access or coverage of medication. Will follow  up with EP, cardiology and contineu following with heme and his PCP for continued management       Problem List Items Addressed This Visit          Hematology    Bilateral pulmonary embolism - Primary       Follow Up:       Wilbur was seen today for hospital follow up.    Diagnoses and all orders for this visit:    Bilateral pulmonary embolism            Other Orders Placed This Visit:  No orders of the defined types were placed in this encounter.            Interview, Assessment, Findings, and Plan discussed with Dr. Coronado    No follow-ups on file.    Wilian Still DO, MS   Internal Medicine

## 2023-07-12 NOTE — PROGRESS NOTES
PATIENT: Wilbur Diop Jr.  MRN: [id number]  DATE: 7/12/2023    Diagnosis:   1. Thrombophilia    2. Pulmonary embolism, unspecified chronicity, unspecified pulmonary embolism type, unspecified whether acute cor pulmonale present    3. Typical atrial flutter        Chief Complaint: Establish Care      Subjective:    History of Present Illness: Mr. Diop is a 43 y.o. male who presents for evaluation and management of bilateral PE.  His pertinent medical history includes significant struggles with anxiety and depression.  He was diagnosed last year with atrial fibrillation and started on Xarelto.  The Xarelto was discontinued in June following an ablation.    He notes that he took a road trip to [place] and developed significant swelling of the right lower extremity.  When he returned home from the trip he presented to the emergency department.  He had a ultrasound performed which did not demonstrate any DVT.  The D-dimer was checked at that time and it was elevated.  A CT scan of the chest did show bilateral pulmonary emboli.  He was initially admitted and started on heparin drip with improvement in coughing which he notes was his primary pulmonary symptom.  Subsequently, he was restarted on Xarelto which at that point he had been off of for at least a couple of weeks.  He presents to clinic today in order to establish care with Hematology for further evaluation of his recent VTE.  He is understandably concerned about the possibility that he may have developed clotting while he was on Xarelto which only became evident or presented clinically when it did.  He states that his cough has been present for he notes that the cough which he associates with the pulmonary embolism was present prior to discontinuation of Xarelto.  There is however no objective evidence to suggest that he had a blood clot while he was on Xarelto initially.  His symptoms have improved since starting Xarelto although the cough remains and he  is concerned that this represents recurrence or return of clots. Additionally, he has been on testosterone therapy which is felt to have potentially contributed to development of clots.  We had a very lengthy discussion today about his diagnosis, treatment, and prognosis.  Patient was present with spouse and they both had numerous questions.  All questions were addressed to their expressed satisfaction.  Past medical, surgical, family, and social histories have been reviewed and updated below.    Past Medical History:   Past Medical History:   Diagnosis Date    ADHD (attention deficit hyperactivity disorder)     Allergy     Anxiety     Atrial fibrillation     Atrial flutter     Depression     History of psychiatric hospitalization     2002 for depression    Hx of psychiatric care     Psychiatric problem     Supraventricular tachycardia     Therapy        Past Surgical History:   Past Surgical History:   Procedure Laterality Date    ABLATION, ATRIAL FIBRILLATION, CRYO N/A 01/24/2023    Procedure: Ablation, Atrial Fibrillation, Cryo;  Surgeon: Carlos Daugherty MD;  Location: [facility] EP LAB;  Service: Cardiology;  Laterality: N/A;  AF, PVI, CRYO, RAFAEL, CHANEL, GEN, DM, 3prep    APPENDECTOMY      HERNIA REPAIR      TRANSESOPHAGEAL ECHOCARDIOGRAPHY N/A 01/24/2023    Procedure: ECHOCARDIOGRAM, TRANSESOPHAGEAL;  Surgeon: Stacia Howell MD;  Location: [facility] EP LAB;  Service: Cardiology;  Laterality: N/A;       Family History:   Family History   Problem Relation Age of Onset    Depression Mother     Anxiety disorder Mother     No Known Problems Father     No Known Problems Sister     No Known Problems Brother     No Known Problems Maternal Aunt     No Known Problems Maternal Uncle     No Known Problems Paternal Aunt     No Known Problems Paternal Uncle     No Known Problems Maternal Grandmother     Cancer Maternal Grandfather         Prostate cancer recovered    No Known Problems Paternal Grandmother     No Known Problems  Paternal Grandfather     Amblyopia Neg Hx     Blindness Neg Hx     Cataracts Neg Hx     Diabetes Neg Hx     Glaucoma Neg Hx     Hypertension Neg Hx     Macular degeneration Neg Hx     Retinal detachment Neg Hx     Strabismus Neg Hx     Stroke Neg Hx     Thyroid disease Neg Hx        Social History:  reports that he quit smoking about 18 years ago. His smoking use included cigarettes. He has never used smokeless tobacco. He reports that he does not currently use alcohol. He reports that he does not use drugs.    Allergies:  Review of patient's allergies indicates:  No Known Allergies    Medications:  Current Outpatient Medications   Medication Sig Dispense Refill    acetaminophen (TYLENOL) 325 MG tablet Take 650 mg by mouth daily as needed (Gout pain).      cholecalciferol, vitamin D3, 125 mcg (5,000 unit) Tab Take 5,000 Units by mouth once daily.      dexmethylphenidate (FOCALIN XR) 5 MG 24 hr capsule Take 10 mg by mouth.      lamoTRIgine (LAMICTAL) 25 MG tablet Take 50 mg by mouth once daily.      lisinopriL (PRINIVIL,ZESTRIL) 20 MG tablet Take 1 tablet (20 mg total) by mouth once daily. (Patient taking differently: Take 20 mg by mouth every evening.) 90 tablet 3    LORazepam (ATIVAN) 0.5 MG tablet Take 0.5 mg by mouth 2 (two) times daily as needed.      magnesium glycinate-mag oxide 120 mg magnesium Cap Take 2 capsules by mouth once daily.      multivitamin (THERAGRAN) per tablet Take 1 tablet by mouth once daily.      omega-3 fatty acids/fish oil (FISH OIL-OMEGA-3 FATTY ACIDS) 300-1,000 mg capsule Take 1 capsule by mouth every morning.      rivaroxaban (XARELTO) 15 mg Tab Take 1 tablet (15 mg total) by mouth 2 (two) times daily with meals for 21 days. Last dose 7/26/2023. 42 tablet 0    [START ON 7/27/2023] rivaroxaban (XARELTO) 20 mg Tab Take 1 tablet (20 mg total) by mouth daily with dinner or evening meal. Starting on 7/27/2023, take one 20 mg tablet every evening with dinner. Last dose 12/21/2023. 147 tablet  "0    SPRAVATO 84 mg (28 mg x 3) Spry 56 mg by Nasal route twice a week.      verapamiL (CALAN-SR) 120 MG CR tablet Take 1 tablet (120 mg total) by mouth every evening. 30 tablet 11    ALPRAZolam (XANAX) 0.5 MG tablet Take 1 tablet (0.5 mg total) by mouth once. Prior to MRI for 1 dose 1 tablet 0    cetirizine (ZYRTEC) 5 MG tablet Take 5 mg by mouth 2 (two) times daily as needed for Allergies.      colchicine (COLCRYS) 0.6 mg tablet Take 2 tablets by mouth once, then take 1 tablet by mouth 1 hour later.  Then take 1 tablet by mouth daily until symptoms resolve (Patient taking differently: Take 0.6 mg by mouth every morning.) 30 tablet 0    dexmethylphenidate (FOCALIN XR) 10 MG 24 hr capsule Take 1 capsule once a day in the morning. 30 capsule 0    diphenhydrAMINE (SOMINEX) 25 mg tablet Take 25 mg by mouth nightly as needed for Insomnia.      ibuprofen (ADVIL,MOTRIN) 200 MG tablet Take 400 mg by mouth daily as needed (Gout pain (alternates w/Tylenol)).      needle, disp, 18 G 18 gauge x 1" Ndle For T injection 100 each 0    needle, disp, 21 G 21 gauge x 1 1/2" Ndle For T injection 100 each 0    syringe, disposable, (BD LUER-GILL SYRINGE) 3 mL Syrg For T injection 100 each 0     No current facility-administered medications for this visit.     Objective:      Vitals:   Vitals:    07/11/23 1628   BP: 136/86   BP Location: Right arm   Patient Position: Sitting   BP Method: Large (Automatic)   Pulse: 84   Resp: 18   SpO2: 98%   Weight: 109.1 kg (240 lb 8.4 oz)   Height: 5' 9" (1.753 m)     BMI: Body mass index is 35.52 kg/m².    Physical Exam  Vitals and nursing note reviewed.   Constitutional:       General: He is not in acute distress.     Appearance: Normal appearance. He is normal weight. He is not toxic-appearing.   HENT:      Head: Normocephalic and atraumatic.   Eyes:      General: No scleral icterus.     Conjunctiva/sclera: Conjunctivae normal.   Cardiovascular:      Rate and Rhythm: Normal rate and regular rhythm. "      Heart sounds: Normal heart sounds.   Pulmonary:      Effort: Pulmonary effort is normal.      Breath sounds: Normal breath sounds. No wheezing, rhonchi or rales.   Abdominal:      General: Abdomen is flat. Bowel sounds are normal.      Palpations: Abdomen is soft.      Tenderness: There is no abdominal tenderness.   Musculoskeletal:         General: No tenderness or deformity.      Cervical back: Neck supple.   Lymphadenopathy:      Cervical: No cervical adenopathy.   Skin:     General: Skin is warm and dry.      Coloration: Skin is not jaundiced.      Findings: No bruising or erythema.   Neurological:      General: No focal deficit present.      Mental Status: He is alert and oriented to person, place, and time. Mental status is at baseline.   Psychiatric:         Mood and Affect: Mood normal.         Behavior: Behavior normal.         Thought Content: Thought content normal.       Laboratory Data:  Labs have been reviewed.        Imaging: Contains abnormal data CTA Chest Non-Coronary (PE Studies)  Order: [id number]  Status: Final result     Visible to patient: Yes (seen)     Next appt: 07/24/2023 at 01:15 PM in Cardiology (EKG, APPT)     0 Result Notes  Details    Reading Physician Reading Date Result Priority   Wilbur Tolbert MD  [phone] 7/4/2023    Annabella Matthew MD  [phone] 7/4/2023      Narrative & Impression  EXAMINATION:  CTA CHEST NON CORONARY (PE STUDIES)     CLINICAL HISTORY:  Pulmonary embolism (PE) suspected, positive D-dimer;     TECHNIQUE:  Low dose axial images, sagittal and coronal reformations were obtained from the thoracic inlet to the lung bases.  75 cc Omnipaque 350 intravenous contrast administered according to PE protocol..     COMPARISON:  CTA 01/17/2023     FINDINGS:  Base of Neck: No significant abnormality.Thyroid appears within normal limits.     Thoracic soft tissues: Unremarkable.     Claudette/Mediastinum: No pathologic henry enlargement.     Heart: Normal size. No  pericardial effusion.     Pulmonary vasculature: Right greater than left bilateral pulmonary emboli.  No large central type pulmonary embolus.  The most centrally located embolus on the right is noted in a segmental branch (2-217), otherwise, there are multiple pulmonary emboli visualized through the subsegmental branches of the right pulmonary artery (for example 2-317, 2-268, 2-248),.  Small subsegmental embolus on the left (2-288).  Small focus of air noted in the main pulmonary artery, likely related to injection of contrast.     Aorta: Left-sided aortic arch with 3 branch vessels.  No aneurysm, dissection, and no significant atherosclerosis.     Trachea and Proximal airways: Patent.     Lungs/Pleura: Respiratory motion artifact at the lung bases.  Symmetrically expanded without consolidation or pneumothorax.  No pleural effusion..  Small calcified granuloma in the medial left upper lobe, unchanged from prior.     Esophagus: Normal course and caliber.     Upper Abdomen: Hepatic steatosis.     Bones: No acute fracture. No suspicious lytic or sclerotic lesions.  Age-appropriate degenerative changes.     Impression:     Right greater than left bilateral pulmonary emboli, as detailed above, with the most central involving a segmental branch of the right pulmonary artery.     Hepatic steatosis.     The critical information above was relayed directly by Annabella Matthew by telephone to Irene Ladd MD on 7/4/2023 at 19:49.     This report was flagged in Epic as abnormal.     Electronically signed by resident: Annabella Matthew  Date:                                            07/04/2023  Time:                                           19:28     Electronically signed by: Wilbur Tolbert MD  Date:                                            07/04/2023  Time:                                           20:20           Exam Ended: 07/04/23 19:26 Last Resulted: 07/04/23 20:20            Assessment:       1. Thrombophilia    2.  Pulmonary embolism, unspecified chronicity, unspecified pulmonary embolism type, unspecified whether acute cor pulmonale present    3. Typical atrial flutter      Recent diagnosis of bilateral PE.  History is rather convoluted however an argument can be made that it was provoked by a long car ride to and from [place]. Testosterone therapy may have also contributed.  He had previously been on Xarelto for atrial fibrillation but had been off of it for a couple of weeks at the time when he developed the acute symptoms.  He has no prior history of VTE and no family history.  He is currently on the loading dose of Xarelto and will resume the regular dose thereafter.  He has tolerated Xarelto well in the past.     Plan:     While the story is very compelling for this having been a provoked event, there are multiple factors which call that into question.  Specifically the presence of cough which he believes is caused by the blood clots and has been present well prior to the diagnosis and developed initially while he was on the maintenance dose of Xarelto.  While cough can certainly be a symptom of pulmonary embolism it is nonspecific and I would not use this as a reference point to determine a timeline for development of this specific clot.  I think he warrants a complete thrombophilia evaluation based on his age and severity of his blood clot.  He is to continue Xarelto for at least 6 months.  There may be caused to continue it indefinitely beyond that but we will determine that in the future.  The question of whether or not testosterone contributing to this is difficult to answer.  I would expect clotting related to testosterone to be associated with some degree of polycythemia which is not being seen here.  Nonetheless I do think that he would need to stop testosterone at least if he plans to discontinue anticoagulation in the future.  If testosterone is felt to be necessary I suspect he would need to remain on blood  thinners for as long as he is on testosterone.     Quique Krishnan MD, FACP  Hematology/Oncology  [facility]  [address], [address]  [place] [address]  Phone: [phone]  Fax: [phone]    Total time of this visit, including time spent face to face with patient and/or via video/audio, and also in preparing for today's visit for MDM and documentation. (Medical Decision Making, including consideration of possible diagnoses, management options, complex medical record review, review of diagnostic tests and information, consideration and discussion of significant complications based on comorbidities, and discussion with providers involved with the care of the patient) 91 minutes. Greater than 50% was spent face to face with the patient counseling and coordinating care.

## 2023-07-13 LAB
AT III ACT/NOR PPP CHRO: 144 % (ref 83–118)
PROT C ACT/NOR PPP CHRO: 162 % (ref 70–150)

## 2023-07-13 NOTE — PROGRESS NOTES
Reviewed documentation of history, physical, assessment, and plan.  The residents presentation is consistent with the documentation of the history and physical.  The assessment and plan reflects my discussion with the resident regarding the care in follow-up this patient.

## 2023-07-16 LAB
APTT IMM NP PPP: 44 SEC (ref 32–48)
APTT P HEP NEUT PPP: ABNORMAL SEC (ref 32–48)
CONFIRM APTT STACLOT: ABNORMAL
DRVVT SCREEN TO CONFIRM RATIO: POSITIVE RATIO
LA 3 SCREEN W REFLEX-IMP: ABNORMAL
LA NT DPL PPP QL: ABNORMAL
MIXING DRVVT: 65 SEC (ref 33–44)
PROTHROMBIN TIME: 16.1 SEC (ref 12–15.5)
REPTILASE TIME: ABNORMAL SEC
SCREEN APTT: 55 SEC (ref 32–48)
SCREEN DRVVT: 94 SEC (ref 33–44)
THROMBIN TIME: 17.6 SEC (ref 14.7–19.5)

## 2023-07-17 LAB
PS IGA SER-ACNC: 0 APS (ref 0–19)
PS IGG SER-ACNC: 2 GPS (ref 0–15)
PS IGM SER-ACNC: 2 MPS (ref 0–21)

## 2023-07-18 ENCOUNTER — NURSE TRIAGE (OUTPATIENT)
Dept: ADMINISTRATIVE | Facility: CLINIC | Age: 43
End: 2023-07-18
Payer: MEDICAID

## 2023-07-18 ENCOUNTER — PATIENT MESSAGE (OUTPATIENT)
Dept: ELECTROPHYSIOLOGY | Facility: CLINIC | Age: 43
End: 2023-07-18
Payer: MEDICAID

## 2023-07-18 LAB
B2 GLYCOPROT1 IGA SER QL: 0.9 U/ML
B2 GLYCOPROT1 IGG SER QL: <0.8 U/ML
B2 GLYCOPROT1 IGM SER QL: <2.4 U/ML
CARDIOLIPIN IGG SER IA-ACNC: <9.4 GPL (ref 0–14.99)
CARDIOLIPIN IGM SER IA-ACNC: <9.4 MPL (ref 0–12.49)
PROT S FREE AG ACT/NOR PPP IA: 114 % (ref 57–171)

## 2023-07-18 NOTE — TELEPHONE ENCOUNTER
Discussed with patient earlier through Patient Advice Request. Per Mago, if he is asymptomatic ok to wait and discuss during his scheduled office visit next week.

## 2023-07-18 NOTE — TELEPHONE ENCOUNTER
Patient states he was hospitalized last week for blood clots in his lungs. Patient states that since discharge he has begun exercising and is having episodes of immediate heart rate decreases during the cool down phase on the treadmill. Patient states while walking on the treadmill his heart rate is 100-125 bpm and on last Friday, 7/14/23,his heart rate dropped to 58 bpm. Patient states on yesterday, 7/17/23, his heart rate decreased to 62 bpm while at the gym on today his heart rate decreased to 42 bpm. Patient denies dizziness, weakness and chest pain and states he is asymptomatic at this time and heart rate is 85-86 bpm.     Patient advised that a message will be sent to Dr. Carlos Daugherty and case encounter routed as High Priority for call back  to patient today for care advice. Patient states understanding and cites no additional concerns at this time.     Addendum: Triage RN unable to send a message to Dr. Carols Daugherty at this time. Dr. Daugherty is offline at this time.       Reason for Disposition   Heart rhythm alert (e.g., 'you have irregular heartbeat') from personal wearable device (e.g., Apple Watch)    Additional Information   Negative: Passed out (i.e., lost consciousness, collapsed and was not responding)   Negative: Shock suspected (e.g., cold/pale/clammy skin, too weak to stand, low BP, rapid pulse)   Negative: Difficult to awaken or acting confused (e.g., disoriented, slurred speech)   Negative: Visible sweat on face or sweat dripping down face   Negative: Unable to walk, or can only walk with assistance (e.g., requires support)   Negative: Received SHOCK from implantable cardiac defibrillator and has persisting symptoms (i.e., palpitations, lightheadedness)   Negative: Dizziness, lightheadedness, or weakness and heart beating very rapidly (e.g., > 140 / minute)   Negative: Dizziness, lightheadedness, or weakness and heart beating very slowly (e.g., < 50 / minute)   Negative: Sounds like a  life-threatening emergency to the triager   Negative: Chest pain   Negative: Difficulty breathing   Negative: Dizziness, lightheadedness, or weakness   Negative: Heart beating very rapidly (e.g., > 140 / minute) and present now  (Exception: During exercise.)   Negative: Heart beating very slowly (e.g., < 50 / minute)  (Exception: Athlete and heart rate normal for caller.)   Negative: New or worsened shortness of breath with activity (dyspnea on exertion)   Negative: Patient sounds very sick or weak to the triager   Negative: Wearing a 'Holter monitor' or 'cardiac event monitor'   Negative: Received SHOCK from implantable cardiac defibrillator (and now feels well)   Negative: Heart beating very rapidly (e.g., > 140 / minute) and not present now  (Exception: During exercise.)   Negative: Skipped or extra beat(s) and increases with exercise or exertion   Negative: Skipped or extra beat(s) and occurs 4 or more times per minute   Negative: History of heart disease (i.e., heart attack, bypass surgery, angina, angioplasty)  (Exception: Brief heartbeat symptoms that went away and now feels well.)   Negative: Age > 60 years  (Exception: Brief heartbeat symptoms that went away and now feels well.)   Negative: Taking water pill (i.e., diuretic) or heart medication (e.g., digoxin)   Negative: Patient wants to be seen    Protocols used: Heart Rate and Heartbeat [id number]

## 2023-07-20 NOTE — PROGRESS NOTES
Mr. Diop is a patient of Dr. Daugherty and was last seen in clinic 6/23/2023.      Subjective:   Patient ID:  Wilbur Diop Jr. is a 43 y.o. male who presents for follow up of Atrial Fibrillation  .     HPI:    Mr. Diop is a 43 y.o. male with anxiety/depression, AFl (RFA of CTI 3/2022), pAF (cryo PVI 1/24/2023), HTN here for follow up.    Background:    anxiety, depression; on meds  AFL, s/p RF CTI  PAF, on flecainide    Had racing heart rate for >2 days. Went to ER 2/11/22; diagnosed with SVT but ECG appears c/w typical AFL with 2:1 conduction. Reportedly CSM had no effect, and arrhythmia terminated following 12 mg adenosine. No ECG of termination.  Felt well since.    Went to ER recently. Again c/w typical AFL. Rx or AFL with RVR was diltiazem; repeat ECG showed NSR.  Update 10/2022:  RF of AFL 3/22. During that case, AFL degenerated to AF at times. SVT was not inducible with up to DAES.  Subsequently, had PAF requiring hospital visit and then monitoring showed 4% AF. Started flecainide.  Since flecainide, doing well.    ECG is NSR  Doing well on flecainide for past weeks.   Discussed options: AAD vs. PVI, with pros/cons of each approach. If opts for PVI, would likely use cryoballoon.    1/24/2023: cryo PVI    4/2023: BPs up - verapamil increased    6/29/2023: He is now 5 months s/p cryo PVI. He is doing well from a rhythm standpoint, with no documented or symptomatic recurrence of arrhythmia since procedure.  Off xarelto. CHADSVASc 1 (HTN). Will start ASA for now. Can stop flecainide at this time. He snores - will refer for sleep study.    Update (07/24/2023):    7/4/2023: Wilbur Diop is 43 y.o. male admitted to observation for bilateral PE. LE US w/o DVT. CTA confirmed PE. Started on heparin gtt. Patient's symptoms of SOB and cough resolved. LE tenderness persists, but repeat dopper US without DVT. Transitioned to therapeutic oral AC with Xarelto 15 mg BID x21 days followed by 20 mg nightly to  "complete 6-month course. Discontinue testosterone injections given risk of clotting. Encouraged physical activity. Follow up with hematology clinic to further assess patient's risk.    Since Brennon been out of the hosiptal Brennon been doing walking on the treadmill. I notice that my heart stays between 100-120. When I go to cool down mode on treadmill. My heart drops last Friday it dropped to 58. Yesterday 62. And today 42.     Saw hematology 7/11/2023: "I think he warrants a complete thrombophilia evaluation based on his age and severity of his blood clot.  He is to continue Xarelto for at least 6 months.  There may be caused to continue it indefinitely beyond that but we will determine that in the future."    Today he says his cough (which he says he experienced for about 2 weeks prior to his PE diagnosis) is gradually improving. Does report low HRs during exercise cool down. Lasting a second or two. Wife said he was pale during these episodes. Some episodes of fast HR on watch - also lasting seconds.  Left leg numb and pain in left arm both boom at night. Chronic SANTA. No CP, sustained palps, syncope reported.    He is currently taking xarelto 15mg BID for PE treatment/stroke prophylaxis and denies significant bleeding episodes. He is currently being treated with verapamil 120mg daily for HR control.  Kidney function is stable, with a creatinine of 0.8 on 7/6/2023.    I have personally reviewed the patient's EKG today, which shows sinus rhythm at 94bpm. MS interval is 138. QRS is 84. QTc is 427.    Relevant Cardiac Test Results:    2D Echo (7/5/2023):  The left ventricle is normal in size with normal systolic function.  The estimated ejection fraction is 63%.  Normal left ventricular diastolic function.  Normal right ventricular size with normal right ventricular systolic function.  Mild right atrial enlargement.  Intermediate central venous pressure (8 mmHg).  The estimated PA systolic pressure is 24 mmHg.    Current " Outpatient Medications   Medication Sig    acetaminophen (TYLENOL) 325 MG tablet Take 650 mg by mouth daily as needed (Gout pain).    cholecalciferol, vitamin D3, 125 mcg (5,000 unit) Tab Take 5,000 Units by mouth once daily.    dexmethylphenidate (FOCALIN XR) 10 MG 24 hr capsule Take 1 capsule once a day in the morning.    hydrOXYzine pamoate (VISTARIL) 25 MG Cap SMARTSI-2 Capsule(s) By Mouth 1-2 Times Daily    lisinopriL (PRINIVIL,ZESTRIL) 20 MG tablet Take 1 tablet (20 mg total) by mouth once daily. (Patient taking differently: Take 20 mg by mouth every evening.)    LORazepam (ATIVAN) 0.5 MG tablet Take 0.5 mg by mouth 2 (two) times daily as needed.    magnesium glycinate-mag oxide 120 mg magnesium Cap Take 2 capsules by mouth once daily.    multivitamin (THERAGRAN) per tablet Take 1 tablet by mouth once daily.    omega-3 fatty acids/fish oil (FISH OIL-OMEGA-3 FATTY ACIDS) 300-1,000 mg capsule Take 1 capsule by mouth every morning.    rivaroxaban (XARELTO) 15 mg Tab Take 1 tablet (15 mg total) by mouth 2 (two) times daily with meals for 21 days. Last dose 2023.    [START ON 2023] rivaroxaban (XARELTO) 20 mg Tab Take 1 tablet (20 mg total) by mouth daily with dinner or evening meal. Starting on 2023, take one 20 mg tablet every evening with dinner. Last dose 2023.    SPRAVATO 84 mg (28 mg x 3) Spry 56 mg by Nasal route once a week.    verapamiL (CALAN-SR) 120 MG CR tablet Take 1 tablet (120 mg total) by mouth every evening.    dexmethylphenidate (FOCALIN XR) 5 MG 24 hr capsule Take 10 mg by mouth.    lamoTRIgine (LAMICTAL) 25 MG tablet Take 50 mg by mouth once daily.     No current facility-administered medications for this visit.       Review of Systems   Constitutional: Negative for malaise/fatigue.   Cardiovascular:  Positive for dyspnea on exertion and irregular heartbeat. Negative for chest pain, leg swelling and palpitations.   Respiratory:  Negative for shortness of breath.   "  Hematologic/Lymphatic: Negative for bleeding problem.   Skin:  Negative for rash.   Musculoskeletal:  Positive for joint pain. Negative for myalgias.   Gastrointestinal:  Negative for hematemesis, hematochezia and nausea.   Genitourinary:  Negative for hematuria.   Neurological:  Positive for numbness. Negative for light-headedness.   Psychiatric/Behavioral:  Negative for altered mental status. The patient is nervous/anxious.    Allergic/Immunologic: Negative for persistent infections.     Objective:          BP (!) 140/80   Pulse 94   Ht 5' 9" (1.753 m)   Wt 107.7 kg (237 lb 7 oz)   BMI 35.06 kg/m²     Physical Exam  Vitals and nursing note reviewed.   Constitutional:       Appearance: Normal appearance. He is well-developed.   HENT:      Head: Normocephalic.      Nose: Nose normal.   Eyes:      Pupils: Pupils are equal, round, and reactive to light.   Cardiovascular:      Rate and Rhythm: Normal rate and regular rhythm.   Pulmonary:      Effort: No respiratory distress.      Breath sounds: Normal breath sounds.   Musculoskeletal:         General: Normal range of motion.   Skin:     General: Skin is warm and dry.      Findings: No erythema.   Neurological:      Mental Status: He is alert and oriented to person, place, and time.   Psychiatric:         Speech: Speech normal.         Behavior: Behavior normal.         Lab Results   Component Value Date     07/06/2023    K 4.0 07/06/2023    MG 2.3 07/06/2023    BUN 15 07/06/2023    CREATININE 0.8 07/06/2023    ALT 31 07/04/2023    AST 20 07/04/2023    HGB 15.1 07/06/2023    HGB 15.1 07/06/2023    HCT 44.6 07/06/2023    HCT 44.6 07/06/2023    TSH 1.154 11/17/2022    LDLCALC 177.2 (H) 11/17/2022       Recent Labs   Lab 03/07/22  1413 01/17/23  0905 07/04/23 2029   INR 1.0 1.1 1.0       Assessment:     1. Paroxysmal atrial fibrillation    2. Typical atrial flutter    3. Status post catheter ablation of atrial flutter    4. Back pain, unspecified back " location, unspecified back pain laterality, unspecified chronicity      Plan:     In summary, Mr. Diop is a 43 y.o. male with anxiety/depression, AFl (RFA of CTI 3/2022), pAF (cryo PVI 1/24/2023), HTN here for follow up.  He is now 6 months s/p PVI. Was hospitalized with bilateral PE 7/4/23 and now on xarelto. He will be switching from xarelto 15mg BID to 20mg daily on 7/27/23. Stopped testosterone and is being worked up by hematology. I reminded him to take xarelto with food. He is reporting george episodes during cool down after exercise - suspect ectopy but will confirm with monitor. He also has SANTA. Echo 7/2023 shows preserved LV function. Suspect this is r/t his PE. Not likely cardiac etiology. He has numbness and joint/back pain - refer to orthopedics.    5 day monitor  Orthopedics referra  Continue current meds  RTC 6 mo (if testing WNL), sooner if needed    *A copy of this note has been sent to Dr. Daugherty*    Follow up in about 6 months (around 1/24/2024).    ------------------------------------------------------------------    ROSALINO Mo, NP-C  Cardiac Electrophysiology

## 2023-07-21 ENCOUNTER — PATIENT MESSAGE (OUTPATIENT)
Dept: ELECTROPHYSIOLOGY | Facility: CLINIC | Age: 43
End: 2023-07-21
Payer: MEDICAID

## 2023-07-24 ENCOUNTER — OFFICE VISIT (OUTPATIENT)
Dept: ELECTROPHYSIOLOGY | Facility: CLINIC | Age: 43
End: 2023-07-24
Payer: MEDICAID

## 2023-07-24 ENCOUNTER — HOSPITAL ENCOUNTER (OUTPATIENT)
Dept: CARDIOLOGY | Facility: CLINIC | Age: 43
Discharge: HOME | End: 2023-07-24
Payer: MEDICAID

## 2023-07-24 VITALS
DIASTOLIC BLOOD PRESSURE: 80 MMHG | SYSTOLIC BLOOD PRESSURE: 140 MMHG | HEIGHT: 69 IN | BODY MASS INDEX: 35.17 KG/M2 | WEIGHT: 237.44 LBS | HEART RATE: 94 BPM

## 2023-07-24 DIAGNOSIS — I48.3 TYPICAL ATRIAL FLUTTER: ICD-10-CM

## 2023-07-24 DIAGNOSIS — I48.0 PAROXYSMAL ATRIAL FIBRILLATION: ICD-10-CM

## 2023-07-24 DIAGNOSIS — I48.0 PAROXYSMAL ATRIAL FIBRILLATION: Primary | ICD-10-CM

## 2023-07-24 DIAGNOSIS — M54.9 BACK PAIN, UNSPECIFIED BACK LOCATION, UNSPECIFIED BACK PAIN LATERALITY, UNSPECIFIED CHRONICITY: ICD-10-CM

## 2023-07-24 DIAGNOSIS — Z98.890 STATUS POST CATHETER ABLATION OF ATRIAL FLUTTER: ICD-10-CM

## 2023-07-24 PROCEDURE — 3079F PR MOST RECENT DIASTOLIC BLOOD PRESSURE 80-89 MM HG: ICD-10-PCS | Mod: CPTII,,, | Performed by: NURSE PRACTITIONER

## 2023-07-24 PROCEDURE — 93010 ELECTROCARDIOGRAM REPORT: CPT | Mod: S$PBB,,, | Performed by: INTERNAL MEDICINE

## 2023-07-24 PROCEDURE — 4010F PR ACE/ARB THEARPY RXD/TAKEN: ICD-10-PCS | Mod: CPTII,,, | Performed by: NURSE PRACTITIONER

## 2023-07-24 PROCEDURE — 3077F PR MOST RECENT SYSTOLIC BLOOD PRESSURE >= 140 MM HG: ICD-10-PCS | Mod: CPTII,,, | Performed by: NURSE PRACTITIONER

## 2023-07-24 PROCEDURE — 3044F HG A1C LEVEL LT 7.0%: CPT | Mod: CPTII,,, | Performed by: NURSE PRACTITIONER

## 2023-07-24 PROCEDURE — 3077F SYST BP >= 140 MM HG: CPT | Mod: CPTII,,, | Performed by: NURSE PRACTITIONER

## 2023-07-24 PROCEDURE — 4010F ACE/ARB THERAPY RXD/TAKEN: CPT | Mod: CPTII,,, | Performed by: NURSE PRACTITIONER

## 2023-07-24 PROCEDURE — 99214 OFFICE O/P EST MOD 30 MIN: CPT | Mod: S$PBB,,, | Performed by: NURSE PRACTITIONER

## 2023-07-24 PROCEDURE — 3008F PR BODY MASS INDEX (BMI) DOCUMENTED: ICD-10-PCS | Mod: CPTII,,, | Performed by: NURSE PRACTITIONER

## 2023-07-24 PROCEDURE — 93005 ELECTROCARDIOGRAM TRACING: CPT | Mod: PBBFAC | Performed by: INTERNAL MEDICINE

## 2023-07-24 PROCEDURE — 99999 PR PBB SHADOW E&M-EST. PATIENT-LVL IV: ICD-10-PCS | Mod: PBBFAC,,, | Performed by: NURSE PRACTITIONER

## 2023-07-24 PROCEDURE — 93010 RHYTHM STRIP: ICD-10-PCS | Mod: S$PBB,,, | Performed by: INTERNAL MEDICINE

## 2023-07-24 PROCEDURE — 99214 PR OFFICE/OUTPT VISIT, EST, LEVL IV, 30-39 MIN: ICD-10-PCS | Mod: S$PBB,,, | Performed by: NURSE PRACTITIONER

## 2023-07-24 PROCEDURE — 1160F PR REVIEW ALL MEDS BY PRESCRIBER/CLIN PHARMACIST DOCUMENTED: ICD-10-PCS | Mod: CPTII,,, | Performed by: NURSE PRACTITIONER

## 2023-07-24 PROCEDURE — 99999 PR PBB SHADOW E&M-EST. PATIENT-LVL IV: CPT | Mod: PBBFAC,,, | Performed by: NURSE PRACTITIONER

## 2023-07-24 PROCEDURE — 1159F MED LIST DOCD IN RCRD: CPT | Mod: CPTII,,, | Performed by: NURSE PRACTITIONER

## 2023-07-24 PROCEDURE — 1159F PR MEDICATION LIST DOCUMENTED IN MEDICAL RECORD: ICD-10-PCS | Mod: CPTII,,, | Performed by: NURSE PRACTITIONER

## 2023-07-24 PROCEDURE — 3008F BODY MASS INDEX DOCD: CPT | Mod: CPTII,,, | Performed by: NURSE PRACTITIONER

## 2023-07-24 PROCEDURE — 99214 OFFICE O/P EST MOD 30 MIN: CPT | Mod: PBBFAC | Performed by: NURSE PRACTITIONER

## 2023-07-24 PROCEDURE — 1160F RVW MEDS BY RX/DR IN RCRD: CPT | Mod: CPTII,,, | Performed by: NURSE PRACTITIONER

## 2023-07-24 PROCEDURE — 3044F PR MOST RECENT HEMOGLOBIN A1C LEVEL <7.0%: ICD-10-PCS | Mod: CPTII,,, | Performed by: NURSE PRACTITIONER

## 2023-07-24 PROCEDURE — 3079F DIAST BP 80-89 MM HG: CPT | Mod: CPTII,,, | Performed by: NURSE PRACTITIONER

## 2023-07-24 RX ORDER — HYDROXYZINE PAMOATE 25 MG/1
CAPSULE ORAL
COMMUNITY
Start: 2023-07-11

## 2023-07-25 ENCOUNTER — CLINICAL SUPPORT (OUTPATIENT)
Dept: CARDIOLOGY | Facility: HOSPITAL | Age: 43
End: 2023-07-25
Attending: NURSE PRACTITIONER
Payer: MEDICAID

## 2023-07-25 ENCOUNTER — OFFICE VISIT (OUTPATIENT)
Dept: HEMATOLOGY/ONCOLOGY | Facility: CLINIC | Age: 43
End: 2023-07-25
Payer: MEDICAID

## 2023-07-25 DIAGNOSIS — D68.59 THROMBOPHILIA: ICD-10-CM

## 2023-07-25 DIAGNOSIS — I48.0 PAROXYSMAL ATRIAL FIBRILLATION: ICD-10-CM

## 2023-07-25 DIAGNOSIS — I26.99 PULMONARY EMBOLISM, UNSPECIFIED CHRONICITY, UNSPECIFIED PULMONARY EMBOLISM TYPE, UNSPECIFIED WHETHER ACUTE COR PULMONALE PRESENT: Primary | ICD-10-CM

## 2023-07-25 PROCEDURE — 99215 PR OFFICE/OUTPT VISIT, EST, LEVL V, 40-54 MIN: ICD-10-PCS | Mod: 95,,, | Performed by: INTERNAL MEDICINE

## 2023-07-25 PROCEDURE — 4010F PR ACE/ARB THEARPY RXD/TAKEN: ICD-10-PCS | Mod: CPTII,95,, | Performed by: INTERNAL MEDICINE

## 2023-07-25 PROCEDURE — 99215 OFFICE O/P EST HI 40 MIN: CPT | Mod: 95,,, | Performed by: INTERNAL MEDICINE

## 2023-07-25 PROCEDURE — 93244 EXT ECG>48HR<7D REV&INTERPJ: CPT | Mod: ,,, | Performed by: INTERNAL MEDICINE

## 2023-07-25 PROCEDURE — 3044F PR MOST RECENT HEMOGLOBIN A1C LEVEL <7.0%: ICD-10-PCS | Mod: CPTII,95,, | Performed by: INTERNAL MEDICINE

## 2023-07-25 PROCEDURE — 3044F HG A1C LEVEL LT 7.0%: CPT | Mod: CPTII,95,, | Performed by: INTERNAL MEDICINE

## 2023-07-25 PROCEDURE — 4010F ACE/ARB THERAPY RXD/TAKEN: CPT | Mod: CPTII,95,, | Performed by: INTERNAL MEDICINE

## 2023-07-25 PROCEDURE — 93244 CV CARDIAC MONITOR - 3-15 DAY ADULT (CUPID ONLY): ICD-10-PCS | Mod: ,,, | Performed by: INTERNAL MEDICINE

## 2023-07-25 NOTE — PROGRESS NOTES
PATIENT: Wilbur Diop Jr.  MRN: [id number]  DATE: 7/25/2023    Subjective:     Chief complaint:  Chief Complaint   Patient presents with    Follow-up   The patient location is: his home  The chief complaint leading to consultation is: pe/thrombophilia    Visit type: audiovisual    Face to Face time with patient:  33min        Each patient to whom he or she provides medical services by telemedicine is:  (1) informed of the relationship between the physician and patient and the respective role of any other health care provider with respect to management of the patient; and (2) notified that he or she may decline to receive medical services by telemedicine and may withdraw from such care at any time.          Interval History: Mr. Diop participates in remote telehealth visit follow-up. Overall doing well. He has numerous questions, all questions answered to his expressed satisfaction.   He states his cough has been improving gradually. Denies chest pains. Noting some concerns about heart rhythm--cardiology ordered holter      Objective:      Vitals: There were no vitals filed for this visit.  BMI: There is no height or weight on file to calculate BMI.      Physical Exam:   No physical exam due to remote nature of the visit.        Laboratory Data:  WBC   Date Value Ref Range Status   07/06/2023 9.83 3.90 - 12.70 K/uL Final   07/06/2023 9.83 3.90 - 12.70 K/uL Final     Hemoglobin   Date Value Ref Range Status   07/06/2023 15.1 14.0 - 18.0 g/dL Final   07/06/2023 15.1 14.0 - 18.0 g/dL Final     Hematocrit   Date Value Ref Range Status   07/06/2023 44.6 40.0 - 54.0 % Final   07/06/2023 44.6 40.0 - 54.0 % Final     Platelets   Date Value Ref Range Status   07/06/2023 290 150 - 450 K/uL Final   07/06/2023 290 150 - 450 K/uL Final     Gran # (ANC)   Date Value Ref Range Status   07/06/2023 4.6 1.8 - 7.7 K/uL Final   07/06/2023 4.6 1.8 - 7.7 K/uL Final     Gran %   Date Value Ref Range Status   07/06/2023 47.1 38.0 -  73.0 % Final   07/06/2023 47.1 38.0 - 73.0 % Final       Chemistry        Component Value Date/Time     07/06/2023 0502    K 4.0 07/06/2023 0502     07/06/2023 0502    CO2 23 07/06/2023 0502    BUN 15 07/06/2023 0502    CREATININE 0.8 07/06/2023 0502     07/06/2023 0502        Component Value Date/Time    CALCIUM 9.2 07/06/2023 0502    ALKPHOS 72 07/04/2023 1821    AST 20 07/04/2023 1821    ALT 31 07/04/2023 1821    BILITOT 0.4 07/04/2023 1821    ESTGFRAFRICA >60.0 03/07/2022 1413    EGFRNONAA >60.0 03/07/2022 1413              Assessment/Plan:     1. Pulmonary embolism, unspecified chronicity, unspecified pulmonary embolism type, unspecified whether acute cor pulmonale present    2. Thrombophilia      Tolerating xarelto well, due to presentation would recommend 6 months anticoagulation.  LAC was positive but all APS serology negative--suspect false positive LAC due to presence of DOAC--repeat at 12 week interval with Xarelto held x48hrs prior to test being done.  Additional thrombophilia tests remain outstanding--will repeat with next lab draw  Repeat d-dimer at 6 weeks post clot to monitor  RTC (virtual ok) September    Med and Orders:  Orders Placed This Encounter    D dimer, quantitative    LUPUS A COAG       Follow Up:  Follow up in about 3 months (around 10/25/2023).    Patient instructions:   There are no Patient Instructions on file for this visit.    Above care plan was discussed with patient and all questions were addressed to their expressed satisfaction.       Quique Krishnan MD, [facility]  Hematology & Medical Oncology  [facility]   Total time of this visit, including time spent face to face with patient and/or via video/audio, and also in preparing for today's visit for MDM and documentation. (Medical Decision Making, including consideration of possible diagnoses, management options, complex medical record review, review of diagnostic tests and information, consideration and  discussion of significant complications based on comorbidities, and discussion with providers involved with the care of the patient) 45 minutes. Greater than 50% was spent face to face with the patient counseling and coordinating care.

## 2023-08-04 ENCOUNTER — TELEPHONE (OUTPATIENT)
Dept: CARDIOLOGY | Facility: HOSPITAL | Age: 43
End: 2023-08-04
Payer: MEDICAID

## 2023-08-04 NOTE — TELEPHONE ENCOUNTER
----- Message from Melissa Ba sent at 8/4/2023  3:04 PM CDT -----    ----- Message -----  From: Shana Dyer MA  Sent: 8/4/2023   3:01 PM CDT  To: [facility]rhythmia Device Staff    The patient would like to know if you received his monitor please call [phone]. Thank you

## 2023-08-10 ENCOUNTER — PATIENT MESSAGE (OUTPATIENT)
Dept: ELECTROPHYSIOLOGY | Facility: CLINIC | Age: 43
End: 2023-08-10
Payer: MEDICAID

## 2023-08-14 ENCOUNTER — LAB VISIT (OUTPATIENT)
Dept: LAB | Facility: HOSPITAL | Age: 43
End: 2023-08-14
Attending: INTERNAL MEDICINE
Payer: MEDICAID

## 2023-08-14 DIAGNOSIS — I26.99 PULMONARY EMBOLISM, UNSPECIFIED CHRONICITY, UNSPECIFIED PULMONARY EMBOLISM TYPE, UNSPECIFIED WHETHER ACUTE COR PULMONALE PRESENT: ICD-10-CM

## 2023-08-14 DIAGNOSIS — D68.59 THROMBOPHILIA: ICD-10-CM

## 2023-08-14 LAB — D DIMER PPP IA.FEU-MCNC: <0.19 MG/L FEU

## 2023-08-14 PROCEDURE — 85379 FIBRIN DEGRADATION QUANT: CPT | Performed by: INTERNAL MEDICINE

## 2023-08-14 PROCEDURE — 81240 F2 GENE: CPT | Performed by: INTERNAL MEDICINE

## 2023-08-14 PROCEDURE — 36415 COLL VENOUS BLD VENIPUNCTURE: CPT | Mod: PO | Performed by: INTERNAL MEDICINE

## 2023-08-14 PROCEDURE — 81241 F5 GENE: CPT | Performed by: INTERNAL MEDICINE

## 2023-08-18 LAB
F2 C.20210G>A GENO BLD/T: NEGATIVE
F5 P.R506Q BLD/T QL: NEGATIVE

## 2023-08-21 ENCOUNTER — PATIENT MESSAGE (OUTPATIENT)
Dept: PRIMARY CARE CLINIC | Facility: CLINIC | Age: 43
End: 2023-08-21
Payer: MEDICAID

## 2023-08-21 DIAGNOSIS — M10.9 GOUT, UNSPECIFIED CAUSE, UNSPECIFIED CHRONICITY, UNSPECIFIED SITE: Primary | ICD-10-CM

## 2023-08-21 RX ORDER — COLCHICINE 0.6 MG/1
CAPSULE ORAL
Qty: 30 CAPSULE | Refills: 0 | Status: SHIPPED | OUTPATIENT
Start: 2023-08-21 | End: 2023-08-23

## 2023-08-22 DIAGNOSIS — M10.9 GOUT, UNSPECIFIED CAUSE, UNSPECIFIED CHRONICITY, UNSPECIFIED SITE: ICD-10-CM

## 2023-08-22 LAB
ONEOME COMMENT: NORMAL
ONEOME METHOD: NORMAL

## 2023-08-23 RX ORDER — COLCHICINE 0.6 MG/1
TABLET ORAL
Qty: 90 TABLET | Refills: 1 | Status: SHIPPED | OUTPATIENT
Start: 2023-08-23 | End: 2023-10-31 | Stop reason: SDUPTHER

## 2023-09-06 ENCOUNTER — LAB VISIT (OUTPATIENT)
Dept: LAB | Facility: HOSPITAL | Age: 43
End: 2023-09-06
Attending: INTERNAL MEDICINE
Payer: MEDICAID

## 2023-09-06 DIAGNOSIS — D68.59 THROMBOPHILIA: ICD-10-CM

## 2023-09-06 DIAGNOSIS — I26.99 PULMONARY EMBOLISM, UNSPECIFIED CHRONICITY, UNSPECIFIED PULMONARY EMBOLISM TYPE, UNSPECIFIED WHETHER ACUTE COR PULMONALE PRESENT: ICD-10-CM

## 2023-09-06 PROCEDURE — 36415 COLL VENOUS BLD VENIPUNCTURE: CPT | Mod: PO | Performed by: INTERNAL MEDICINE

## 2023-09-06 PROCEDURE — 85613 RUSSELL VIPER VENOM DILUTED: CPT | Performed by: INTERNAL MEDICINE

## 2023-09-06 PROCEDURE — 85610 PROTHROMBIN TIME: CPT | Performed by: INTERNAL MEDICINE

## 2023-09-11 ENCOUNTER — TELEPHONE (OUTPATIENT)
Dept: HEMATOLOGY/ONCOLOGY | Facility: CLINIC | Age: 43
End: 2023-09-11
Payer: MEDICAID

## 2023-09-11 ENCOUNTER — OFFICE VISIT (OUTPATIENT)
Dept: HEMATOLOGY/ONCOLOGY | Facility: CLINIC | Age: 43
End: 2023-09-11
Payer: MEDICAID

## 2023-09-11 DIAGNOSIS — D68.59 THROMBOPHILIA: ICD-10-CM

## 2023-09-11 DIAGNOSIS — I26.99 PULMONARY EMBOLISM, UNSPECIFIED CHRONICITY, UNSPECIFIED PULMONARY EMBOLISM TYPE, UNSPECIFIED WHETHER ACUTE COR PULMONALE PRESENT: Primary | ICD-10-CM

## 2023-09-11 LAB
APTT IMM NP PPP: NORMAL SEC (ref 32–48)
APTT P HEP NEUT PPP: NORMAL SEC (ref 32–48)
CONFIRM APTT STACLOT: NORMAL
DRVVT SCREEN TO CONFIRM RATIO: NORMAL RATIO
LA 3 SCREEN W REFLEX-IMP: NORMAL
LA NT DPL PPP QL: NORMAL
MIXING DRVVT: NORMAL SEC (ref 33–44)
PROTHROMBIN TIME: 12.5 SEC (ref 12–15.5)
REPTILASE TIME: NORMAL SEC
SCREEN APTT: 36 SEC (ref 32–48)
SCREEN DRVVT: 43 SEC (ref 33–44)
THROMBIN TIME: NORMAL SEC (ref 14.7–19.5)

## 2023-09-11 PROCEDURE — 99213 PR OFFICE/OUTPT VISIT, EST, LEVL III, 20-29 MIN: ICD-10-PCS | Mod: 95,,, | Performed by: INTERNAL MEDICINE

## 2023-09-11 PROCEDURE — 4010F ACE/ARB THERAPY RXD/TAKEN: CPT | Mod: CPTII,95,, | Performed by: INTERNAL MEDICINE

## 2023-09-11 PROCEDURE — 3044F PR MOST RECENT HEMOGLOBIN A1C LEVEL <7.0%: ICD-10-PCS | Mod: CPTII,95,, | Performed by: INTERNAL MEDICINE

## 2023-09-11 PROCEDURE — 4010F PR ACE/ARB THEARPY RXD/TAKEN: ICD-10-PCS | Mod: CPTII,95,, | Performed by: INTERNAL MEDICINE

## 2023-09-11 PROCEDURE — 3044F HG A1C LEVEL LT 7.0%: CPT | Mod: CPTII,95,, | Performed by: INTERNAL MEDICINE

## 2023-09-11 PROCEDURE — 99213 OFFICE O/P EST LOW 20 MIN: CPT | Mod: 95,,, | Performed by: INTERNAL MEDICINE

## 2023-09-11 NOTE — TELEPHONE ENCOUNTER
----- Message from Shawnee Davis sent at 9/11/2023  3:01 PM CDT -----  Regarding: Call Back  Contact: [phone]  Type:  Patient Returning Call    Who Called: PT   Who Left Message for Patient: Nurse   Does the patient know what this is regarding?: Yes   Would the patient rather a call back or a response via [facility]?  Call Back   Best Call Back Number: [phone]  Additional Information:

## 2023-09-11 NOTE — PROGRESS NOTES
PATIENT: Wilbur Diop Jr.  MRN: [id number]  DATE: 9/11/2023    The patient location is: [place]  The chief complaint leading to consultation is: follow-up on anticoagulation    Visit type: audiovisual    Face to Face time with patient: 8 min.          Subjective:     Chief complaint:  Chief Complaint   Patient presents with    Follow-up       Interval History: Mr. Diop returns for follow up on VTE. He remains on Xarelto and reports good adherence. He did hold the medication for a few doses in order to take the lupus anticoagulant test again--when tested off anticoagulation it was negative. He had a normal d-dimer when checked last month. He reports overall symptoms are generally improved--still with some exertional intolerance but seemingly better than previously. He has been exercising more.      Review of Systems   A comprehensive review of systems was performed; pertinent positives and negatives are noted in the HPI.        Objective:      Vitals: There were no vitals filed for this visit.  BMI: There is no height or weight on file to calculate BMI.      Physical Exam:   No physical exam due to remote nature of the visit.        Laboratory Data:  WBC   Date Value Ref Range Status   07/06/2023 9.83 3.90 - 12.70 K/uL Final   07/06/2023 9.83 3.90 - 12.70 K/uL Final     Hemoglobin   Date Value Ref Range Status   07/06/2023 15.1 14.0 - 18.0 g/dL Final   07/06/2023 15.1 14.0 - 18.0 g/dL Final     Hematocrit   Date Value Ref Range Status   07/06/2023 44.6 40.0 - 54.0 % Final   07/06/2023 44.6 40.0 - 54.0 % Final     Platelets   Date Value Ref Range Status   07/06/2023 290 150 - 450 K/uL Final   07/06/2023 290 150 - 450 K/uL Final     Gran # (ANC)   Date Value Ref Range Status   07/06/2023 4.6 1.8 - 7.7 K/uL Final   07/06/2023 4.6 1.8 - 7.7 K/uL Final     Gran %   Date Value Ref Range Status   07/06/2023 47.1 38.0 - 73.0 % Final   07/06/2023 47.1 38.0 - 73.0 % Final       Chemistry        Component Value  Date/Time     07/06/2023 0502    K 4.0 07/06/2023 0502     07/06/2023 0502    CO2 23 07/06/2023 0502    BUN 15 07/06/2023 0502    CREATININE 0.8 07/06/2023 0502     07/06/2023 0502        Component Value Date/Time    CALCIUM 9.2 07/06/2023 0502    ALKPHOS 72 07/04/2023 1821    AST 20 07/04/2023 1821    ALT 31 07/04/2023 1821    BILITOT 0.4 07/04/2023 1821    ESTGFRAFRICA >105 08/23/2022 1203    ESTGFRAFRICA >60.0 03/07/2022 1413    EGFRNONAA >60.0 03/07/2022 1413              Assessment/Plan:     1. Pulmonary embolism, unspecified chronicity, unspecified pulmonary embolism type, unspecified whether acute cor pulmonale present    2. Thrombophilia      Continue Xarelto to complete total of 6 months.  RTC at 6 month since starting Xarelto with d-dimer; eval for discontinuation vs continued AC at that time.     Med and Orders:       Follow Up:  Follow up in about 17 weeks (around 1/8/2024).    Patient instructions:   There are no Patient Instructions on file for this visit.    Above care plan was discussed with patient and all questions were addressed to their expressed satisfaction.       Quique Krishnan MD, FACP  Hematology & Medical Oncology  [facility]

## 2023-09-15 ENCOUNTER — PATIENT MESSAGE (OUTPATIENT)
Dept: UROLOGY | Facility: CLINIC | Age: 43
End: 2023-09-15
Payer: MEDICAID

## 2023-09-15 DIAGNOSIS — E29.1 HYPOGONADISM MALE: Primary | ICD-10-CM

## 2023-09-20 ENCOUNTER — PATIENT MESSAGE (OUTPATIENT)
Dept: HEMATOLOGY/ONCOLOGY | Facility: CLINIC | Age: 43
End: 2023-09-20
Payer: MEDICAID

## 2023-09-20 ENCOUNTER — PATIENT MESSAGE (OUTPATIENT)
Dept: PRIMARY CARE CLINIC | Facility: CLINIC | Age: 43
End: 2023-09-20
Payer: MEDICAID

## 2023-09-21 ENCOUNTER — PATIENT MESSAGE (OUTPATIENT)
Dept: ELECTROPHYSIOLOGY | Facility: CLINIC | Age: 43
End: 2023-09-21
Payer: MEDICAID

## 2023-09-22 ENCOUNTER — HOSPITAL ENCOUNTER (OUTPATIENT)
Dept: RADIOLOGY | Facility: HOSPITAL | Age: 43
Discharge: HOME | End: 2023-09-22
Attending: INTERNAL MEDICINE
Payer: MEDICAID

## 2023-09-22 DIAGNOSIS — D68.59 THROMBOPHILIA: ICD-10-CM

## 2023-09-22 DIAGNOSIS — I48.3 TYPICAL ATRIAL FLUTTER: ICD-10-CM

## 2023-09-22 DIAGNOSIS — I26.99 PULMONARY EMBOLISM, UNSPECIFIED CHRONICITY, UNSPECIFIED PULMONARY EMBOLISM TYPE, UNSPECIFIED WHETHER ACUTE COR PULMONALE PRESENT: ICD-10-CM

## 2023-09-22 PROCEDURE — 71046 XR CHEST PA AND LATERAL: ICD-10-PCS | Mod: 26,,, | Performed by: RADIOLOGY

## 2023-09-22 PROCEDURE — 71046 X-RAY EXAM CHEST 2 VIEWS: CPT | Mod: TC,PO

## 2023-09-22 PROCEDURE — 71046 X-RAY EXAM CHEST 2 VIEWS: CPT | Mod: 26,,, | Performed by: RADIOLOGY

## 2023-09-22 RX ORDER — VERAPAMIL HYDROCHLORIDE 120 MG/1
120 TABLET, FILM COATED, EXTENDED RELEASE ORAL NIGHTLY
Qty: 90 TABLET | Refills: 3
Start: 2023-09-22 | End: 2023-09-27 | Stop reason: SDUPTHER

## 2023-09-22 RX ORDER — VERAPAMIL HYDROCHLORIDE 120 MG/1
120 TABLET, FILM COATED, EXTENDED RELEASE ORAL NIGHTLY
Qty: 90 TABLET | Refills: 3
Start: 2023-09-22 | End: 2023-09-22 | Stop reason: SDUPTHER

## 2023-09-25 ENCOUNTER — PATIENT MESSAGE (OUTPATIENT)
Dept: ENDOCRINOLOGY | Facility: CLINIC | Age: 43
End: 2023-09-25
Payer: MEDICAID

## 2023-09-26 ENCOUNTER — TELEPHONE (OUTPATIENT)
Dept: ENDOCRINOLOGY | Facility: CLINIC | Age: 43
End: 2023-09-26
Payer: MEDICAID

## 2023-09-26 DIAGNOSIS — E29.1 HYPOGONADISM IN MALE: Primary | ICD-10-CM

## 2023-09-27 ENCOUNTER — PATIENT MESSAGE (OUTPATIENT)
Dept: PRIMARY CARE CLINIC | Facility: CLINIC | Age: 43
End: 2023-09-27
Payer: MEDICAID

## 2023-09-27 ENCOUNTER — TELEPHONE (OUTPATIENT)
Dept: OPHTHALMOLOGY | Facility: CLINIC | Age: 43
End: 2023-09-27
Payer: MEDICAID

## 2023-09-27 DIAGNOSIS — I48.3 TYPICAL ATRIAL FLUTTER: ICD-10-CM

## 2023-09-27 DIAGNOSIS — E29.1 HYPOGONADISM IN MALE: Primary | ICD-10-CM

## 2023-09-27 RX ORDER — VERAPAMIL HYDROCHLORIDE 120 MG/1
120 TABLET, FILM COATED, EXTENDED RELEASE ORAL NIGHTLY
Qty: 90 TABLET | Refills: 3
Start: 2023-09-27 | End: 2023-09-28 | Stop reason: SDUPTHER

## 2023-09-27 RX ORDER — ALPRAZOLAM 0.25 MG/1
0.25 TABLET ORAL ONCE
Qty: 1 TABLET | Refills: 0 | Status: SHIPPED | OUTPATIENT
Start: 2023-09-27 | End: 2023-12-04

## 2023-09-27 NOTE — TELEPHONE ENCOUNTER
----- Message from Yolanda Yao sent at 9/27/2023  2:42 PM CDT -----  Consult/Advisory    Name Of Caller:Wilbur       Contact Preference:[phone]    Nature of call: ptn said if he wave his hand it is like he is seeing a image other objects when looking looks stretched to him

## 2023-09-28 DIAGNOSIS — I48.3 TYPICAL ATRIAL FLUTTER: ICD-10-CM

## 2023-09-28 RX ORDER — VERAPAMIL HYDROCHLORIDE 120 MG/1
120 TABLET, FILM COATED, EXTENDED RELEASE ORAL NIGHTLY
Qty: 90 TABLET | Refills: 3 | Status: SHIPPED | OUTPATIENT
Start: 2023-09-28 | End: 2024-03-08

## 2023-10-02 RX ORDER — LISINOPRIL 20 MG/1
20 TABLET ORAL DAILY
Qty: 90 TABLET | Refills: 3 | Status: SHIPPED | OUTPATIENT
Start: 2023-10-02 | End: 2024-03-08

## 2023-10-02 NOTE — TELEPHONE ENCOUNTER
No care due was identified.  [facility] Embedded Care Due Messages. Reference number: [id number].   10/02/2023 8:09:30 AM CDT

## 2023-10-02 NOTE — TELEPHONE ENCOUNTER
Refill Routing Note   Medication(s) are not appropriate for processing by [facility] for the following reason(s):      No active prescription written by provider    [facility] action(s):    Care Due:  None identified                Appointments  past 12m or future 3m with PCP    Date Provider   Last Visit   4/3/2023 Ge Naranjo MD   Next Visit   Visit date not found Ge Naranjo MD   ED visits in past 90 days: 0        Note composed:10:49 AM 10/02/2023

## 2023-10-02 NOTE — TELEPHONE ENCOUNTER
Pt requesting med refill, med pended. Last prescribed by another provider     LOV with Ge Naranjo MD , 4/3/2023

## 2023-10-09 PROBLEM — I26.99 BILATERAL PULMONARY EMBOLISM: Status: RESOLVED | Noted: 2023-07-04 | Resolved: 2023-10-09

## 2023-10-16 ENCOUNTER — PATIENT MESSAGE (OUTPATIENT)
Dept: PRIMARY CARE CLINIC | Facility: CLINIC | Age: 43
End: 2023-10-16
Payer: MEDICAID

## 2023-10-16 ENCOUNTER — HOSPITAL ENCOUNTER (OUTPATIENT)
Dept: RADIOLOGY | Facility: HOSPITAL | Age: 43
Discharge: HOME | End: 2023-10-16
Attending: NURSE PRACTITIONER
Payer: MEDICAID

## 2023-10-16 ENCOUNTER — PATIENT MESSAGE (OUTPATIENT)
Dept: ENDOCRINOLOGY | Facility: CLINIC | Age: 43
End: 2023-10-16
Payer: MEDICAID

## 2023-10-16 DIAGNOSIS — E29.1 HYPOGONADISM IN MALE: ICD-10-CM

## 2023-10-16 DIAGNOSIS — H53.9 ABNORMAL VISION: Primary | ICD-10-CM

## 2023-10-16 PROCEDURE — 70553 MRI BRAIN STEM W/O & W/DYE: CPT | Mod: 26,,, | Performed by: STUDENT IN AN ORGANIZED HEALTH CARE EDUCATION/TRAINING PROGRAM

## 2023-10-16 PROCEDURE — 25500020 PHARM REV CODE 255: Performed by: NURSE PRACTITIONER

## 2023-10-16 PROCEDURE — 70553 MRI BRAIN STEM W/O & W/DYE: CPT | Mod: TC

## 2023-10-16 PROCEDURE — 70553 MRI PITUITARY W W/O CONTRAST: ICD-10-PCS | Mod: 26,,, | Performed by: STUDENT IN AN ORGANIZED HEALTH CARE EDUCATION/TRAINING PROGRAM

## 2023-10-16 PROCEDURE — A9585 GADOBUTROL INJECTION: HCPCS | Performed by: NURSE PRACTITIONER

## 2023-10-16 RX ORDER — GADOBUTROL 604.72 MG/ML
5 INJECTION INTRAVENOUS
Status: COMPLETED | OUTPATIENT
Start: 2023-10-16 | End: 2023-10-16

## 2023-10-16 RX ADMIN — GADOBUTROL 5 ML: 604.72 INJECTION INTRAVENOUS at 07:10

## 2023-10-17 DIAGNOSIS — E29.1 HYPOGONADISM IN MALE: Primary | ICD-10-CM

## 2023-10-20 ENCOUNTER — TELEPHONE (OUTPATIENT)
Dept: PRIMARY CARE CLINIC | Facility: CLINIC | Age: 43
End: 2023-10-20
Payer: MEDICAID

## 2023-10-20 NOTE — TELEPHONE ENCOUNTER
----- Message from Ashly Altamirano sent at 10/20/2023  2:20 PM CDT -----  Pt would like to be called back regarding  MRI result    Pt can be reached at  [phone]

## 2023-10-22 ENCOUNTER — PATIENT MESSAGE (OUTPATIENT)
Dept: ELECTROPHYSIOLOGY | Facility: CLINIC | Age: 43
End: 2023-10-22
Payer: MEDICAID

## 2023-10-22 ENCOUNTER — PATIENT MESSAGE (OUTPATIENT)
Dept: PRIMARY CARE CLINIC | Facility: CLINIC | Age: 43
End: 2023-10-22
Payer: MEDICAID

## 2023-10-22 DIAGNOSIS — M10.9 GOUT, UNSPECIFIED CAUSE, UNSPECIFIED CHRONICITY, UNSPECIFIED SITE: Primary | ICD-10-CM

## 2023-10-23 ENCOUNTER — LAB VISIT (OUTPATIENT)
Dept: LAB | Facility: HOSPITAL | Age: 43
End: 2023-10-23
Payer: MEDICAID

## 2023-10-23 DIAGNOSIS — E29.1 HYPOGONADISM IN MALE: ICD-10-CM

## 2023-10-23 DIAGNOSIS — I48.0 PAROXYSMAL ATRIAL FIBRILLATION: Primary | ICD-10-CM

## 2023-10-23 LAB
BASOPHILS # BLD AUTO: 0.06 K/UL (ref 0–0.2)
BASOPHILS NFR BLD: 0.7 % (ref 0–1.9)
DIFFERENTIAL METHOD: ABNORMAL
EOSINOPHIL # BLD AUTO: 0.3 K/UL (ref 0–0.5)
EOSINOPHIL NFR BLD: 3.3 % (ref 0–8)
ERYTHROCYTE [DISTWIDTH] IN BLOOD BY AUTOMATED COUNT: 12.9 % (ref 11.5–14.5)
HCT VFR BLD AUTO: 46.2 % (ref 40–54)
HGB BLD-MCNC: 15.1 G/DL (ref 14–18)
IMM GRANULOCYTES # BLD AUTO: 0.06 K/UL (ref 0–0.04)
IMM GRANULOCYTES NFR BLD AUTO: 0.7 % (ref 0–0.5)
LYMPHOCYTES # BLD AUTO: 2.3 K/UL (ref 1–4.8)
LYMPHOCYTES NFR BLD: 25.7 % (ref 18–48)
MCH RBC QN AUTO: 29.4 PG (ref 27–31)
MCHC RBC AUTO-ENTMCNC: 32.7 G/DL (ref 32–36)
MCV RBC AUTO: 90 FL (ref 82–98)
MONOCYTES # BLD AUTO: 0.9 K/UL (ref 0.3–1)
MONOCYTES NFR BLD: 9.5 % (ref 4–15)
NEUTROPHILS # BLD AUTO: 5.4 K/UL (ref 1.8–7.7)
NEUTROPHILS NFR BLD: 60.1 % (ref 38–73)
NRBC BLD-RTO: 0 /100 WBC
PLATELET # BLD AUTO: 345 K/UL (ref 150–450)
PMV BLD AUTO: 10.2 FL (ref 9.2–12.9)
RBC # BLD AUTO: 5.14 M/UL (ref 4.6–6.2)
WBC # BLD AUTO: 8.96 K/UL (ref 3.9–12.7)

## 2023-10-23 PROCEDURE — 80053 COMPREHEN METABOLIC PANEL: CPT | Performed by: NURSE PRACTITIONER

## 2023-10-23 PROCEDURE — 85025 COMPLETE CBC W/AUTO DIFF WBC: CPT | Performed by: NURSE PRACTITIONER

## 2023-10-23 PROCEDURE — 36415 COLL VENOUS BLD VENIPUNCTURE: CPT | Mod: PO | Performed by: NURSE PRACTITIONER

## 2023-10-23 PROCEDURE — 84403 ASSAY OF TOTAL TESTOSTERONE: CPT | Performed by: NURSE PRACTITIONER

## 2023-10-24 LAB
ALBUMIN SERPL BCP-MCNC: 4 G/DL (ref 3.5–5.2)
ALP SERPL-CCNC: 71 U/L (ref 55–135)
ALT SERPL W/O P-5'-P-CCNC: 34 U/L (ref 10–44)
ANION GAP SERPL CALC-SCNC: 11 MMOL/L (ref 8–16)
AST SERPL-CCNC: 21 U/L (ref 10–40)
BILIRUB SERPL-MCNC: 0.6 MG/DL (ref 0.1–1)
BUN SERPL-MCNC: 14 MG/DL (ref 6–20)
CALCIUM SERPL-MCNC: 9.7 MG/DL (ref 8.7–10.5)
CHLORIDE SERPL-SCNC: 103 MMOL/L (ref 95–110)
CO2 SERPL-SCNC: 23 MMOL/L (ref 23–29)
CREAT SERPL-MCNC: 0.8 MG/DL (ref 0.5–1.4)
EST. GFR  (NO RACE VARIABLE): >60 ML/MIN/1.73 M^2
GLUCOSE SERPL-MCNC: 117 MG/DL (ref 70–110)
POTASSIUM SERPL-SCNC: 4.7 MMOL/L (ref 3.5–5.1)
PROT SERPL-MCNC: 7.5 G/DL (ref 6–8.4)
SODIUM SERPL-SCNC: 137 MMOL/L (ref 136–145)
TESTOST SERPL-MCNC: 172 NG/DL (ref 304–1227)

## 2023-10-24 RX ORDER — METHYLPREDNISOLONE 4 MG/1
TABLET ORAL
Qty: 21 EACH | Refills: 0 | Status: SHIPPED | OUTPATIENT
Start: 2023-10-24 | End: 2023-11-14

## 2023-10-25 ENCOUNTER — HOSPITAL ENCOUNTER (OUTPATIENT)
Dept: CARDIOLOGY | Facility: CLINIC | Age: 43
Discharge: HOME | End: 2023-10-25
Payer: MEDICAID

## 2023-10-25 DIAGNOSIS — I48.0 PAROXYSMAL ATRIAL FIBRILLATION: ICD-10-CM

## 2023-10-25 PROCEDURE — 93005 ELECTROCARDIOGRAM TRACING: CPT | Mod: PBBFAC | Performed by: INTERNAL MEDICINE

## 2023-10-25 PROCEDURE — 93010 EKG 12-LEAD: ICD-10-PCS | Mod: S$PBB,,, | Performed by: INTERNAL MEDICINE

## 2023-10-25 PROCEDURE — 93010 ELECTROCARDIOGRAM REPORT: CPT | Mod: S$PBB,,, | Performed by: INTERNAL MEDICINE

## 2023-10-31 ENCOUNTER — PATIENT MESSAGE (OUTPATIENT)
Dept: PRIMARY CARE CLINIC | Facility: CLINIC | Age: 43
End: 2023-10-31
Payer: MEDICAID

## 2023-10-31 DIAGNOSIS — M10.9 GOUT, UNSPECIFIED CAUSE, UNSPECIFIED CHRONICITY, UNSPECIFIED SITE: ICD-10-CM

## 2023-10-31 RX ORDER — COLCHICINE 0.6 MG/1
TABLET ORAL
Qty: 90 TABLET | Refills: 1 | Status: SHIPPED | OUTPATIENT
Start: 2023-10-31

## 2023-11-15 ENCOUNTER — PATIENT MESSAGE (OUTPATIENT)
Dept: HEMATOLOGY/ONCOLOGY | Facility: CLINIC | Age: 43
End: 2023-11-15
Payer: MEDICAID

## 2023-11-29 DIAGNOSIS — I48.0 PAROXYSMAL ATRIAL FIBRILLATION: Primary | ICD-10-CM

## 2023-11-30 ENCOUNTER — PATIENT MESSAGE (OUTPATIENT)
Dept: PRIMARY CARE CLINIC | Facility: CLINIC | Age: 43
End: 2023-11-30
Payer: MEDICAID

## 2023-12-04 ENCOUNTER — OFFICE VISIT (OUTPATIENT)
Dept: PRIMARY CARE CLINIC | Facility: CLINIC | Age: 43
End: 2023-12-04
Payer: MEDICAID

## 2023-12-04 VITALS
DIASTOLIC BLOOD PRESSURE: 70 MMHG | OXYGEN SATURATION: 97 % | WEIGHT: 233.25 LBS | SYSTOLIC BLOOD PRESSURE: 104 MMHG | HEART RATE: 87 BPM | BODY MASS INDEX: 34.55 KG/M2 | HEIGHT: 69 IN

## 2023-12-04 DIAGNOSIS — M10.9 GOUT, UNSPECIFIED CAUSE, UNSPECIFIED CHRONICITY, UNSPECIFIED SITE: Primary | ICD-10-CM

## 2023-12-04 PROCEDURE — 99999 PR PBB SHADOW E&M-EST. PATIENT-LVL III: ICD-10-PCS | Mod: PBBFAC,,, | Performed by: INTERNAL MEDICINE

## 2023-12-04 PROCEDURE — 1159F PR MEDICATION LIST DOCUMENTED IN MEDICAL RECORD: ICD-10-PCS | Mod: CPTII,,, | Performed by: INTERNAL MEDICINE

## 2023-12-04 PROCEDURE — 3044F PR MOST RECENT HEMOGLOBIN A1C LEVEL <7.0%: ICD-10-PCS | Mod: CPTII,,, | Performed by: INTERNAL MEDICINE

## 2023-12-04 PROCEDURE — 4010F ACE/ARB THERAPY RXD/TAKEN: CPT | Mod: CPTII,,, | Performed by: INTERNAL MEDICINE

## 2023-12-04 PROCEDURE — 99214 OFFICE O/P EST MOD 30 MIN: CPT | Mod: S$PBB,,, | Performed by: INTERNAL MEDICINE

## 2023-12-04 PROCEDURE — 4010F PR ACE/ARB THEARPY RXD/TAKEN: ICD-10-PCS | Mod: CPTII,,, | Performed by: INTERNAL MEDICINE

## 2023-12-04 PROCEDURE — 3044F HG A1C LEVEL LT 7.0%: CPT | Mod: CPTII,,, | Performed by: INTERNAL MEDICINE

## 2023-12-04 PROCEDURE — 99214 PR OFFICE/OUTPT VISIT, EST, LEVL IV, 30-39 MIN: ICD-10-PCS | Mod: S$PBB,,, | Performed by: INTERNAL MEDICINE

## 2023-12-04 PROCEDURE — 99213 OFFICE O/P EST LOW 20 MIN: CPT | Mod: PBBFAC | Performed by: INTERNAL MEDICINE

## 2023-12-04 PROCEDURE — 1159F MED LIST DOCD IN RCRD: CPT | Mod: CPTII,,, | Performed by: INTERNAL MEDICINE

## 2023-12-04 PROCEDURE — 3008F PR BODY MASS INDEX (BMI) DOCUMENTED: ICD-10-PCS | Mod: CPTII,,, | Performed by: INTERNAL MEDICINE

## 2023-12-04 PROCEDURE — 3074F SYST BP LT 130 MM HG: CPT | Mod: CPTII,,, | Performed by: INTERNAL MEDICINE

## 2023-12-04 PROCEDURE — 3008F BODY MASS INDEX DOCD: CPT | Mod: CPTII,,, | Performed by: INTERNAL MEDICINE

## 2023-12-04 PROCEDURE — 3074F PR MOST RECENT SYSTOLIC BLOOD PRESSURE < 130 MM HG: ICD-10-PCS | Mod: CPTII,,, | Performed by: INTERNAL MEDICINE

## 2023-12-04 PROCEDURE — 99999 PR PBB SHADOW E&M-EST. PATIENT-LVL III: CPT | Mod: PBBFAC,,, | Performed by: INTERNAL MEDICINE

## 2023-12-04 PROCEDURE — 3078F PR MOST RECENT DIASTOLIC BLOOD PRESSURE < 80 MM HG: ICD-10-PCS | Mod: CPTII,,, | Performed by: INTERNAL MEDICINE

## 2023-12-04 PROCEDURE — 3078F DIAST BP <80 MM HG: CPT | Mod: CPTII,,, | Performed by: INTERNAL MEDICINE

## 2023-12-04 RX ORDER — BUSPIRONE HYDROCHLORIDE 10 MG/1
10 TABLET ORAL 2 TIMES DAILY
Qty: 60 TABLET | Refills: 11 | COMMUNITY
Start: 2023-12-04

## 2023-12-04 RX ORDER — LURASIDONE HYDROCHLORIDE 20 MG/1
20 TABLET, FILM COATED ORAL NIGHTLY
Qty: 30 TABLET | Refills: 11 | COMMUNITY
Start: 2023-12-04

## 2023-12-04 RX ORDER — ALLOPURINOL 300 MG/1
300 TABLET ORAL DAILY
Qty: 90 TABLET | Refills: 3 | Status: SHIPPED | OUTPATIENT
Start: 2023-12-04

## 2023-12-04 NOTE — PROGRESS NOTES
[facility] Note    Chief Complaint      Chief Complaint   Patient presents with    Gout       History of Present Illness      Wilbur Diop Jr. is a 43 y.o. male with chronic conditions of SVT, atrial flutter, anxiety, ADD, gout, BUDDY who presents today for: follow up gout in left 1st MTP.  Has been on colchicine and ibuprofen which helps intermittently.    Complains of intermittent dizziness.  Not reliably positional but does seem more likely to occur when leaning forward, then getting up.    Anxiety, depression, ADD: Sees Dr. Malin. Has tried sertraline, buspirone, rexulti, different stimulants (Azstarys).  Now on lexapro and focalin xr.    SVT, atrial flutter: Sees Dr. Daugherty, EP cardiology.  On flecainide, verapamil.  On xarelto for anticoagulation.    Flu shot declines.  Tdap 2016.  Shingrix due at age 48.  Pneumonia vaccine due at age 63.  C scope due at age 43.  PSA due at age 43.     Past Medical History:  Past Medical History:   Diagnosis Date    ADHD (attention deficit hyperactivity disorder)     Allergy     Anxiety     Atrial fibrillation     Atrial flutter     Depression     History of psychiatric hospitalization     2002 for depression    Hx of psychiatric care     Psychiatric problem     Supraventricular tachycardia     Therapy        Past Surgical History:   has a past surgical history that includes Appendectomy; Hernia repair; ablation, atrial fibrillation, cryo (N/A, 01/24/2023); and Transesophageal echocardiography (N/A, 01/24/2023).    Family History:  family history includes Anxiety disorder in his mother; Cancer in his maternal grandfather; Depression in his mother; No Known Problems in his brother, father, maternal aunt, maternal grandmother, maternal uncle, paternal aunt, paternal grandfather, paternal grandmother, paternal uncle, and sister.     Social History:  Social History     Tobacco Use    Smoking status: Former     Current packs/day: 0.00     Types: Cigarettes      Quit date: 10/22/2004     Years since quittin.1    Smokeless tobacco: Never   Substance Use Topics    Alcohol use: Not Currently     Comment: once a month; socially    Drug use: No       I personally reviewed all past medical, surgical, social and family history.    Review of Systems   Constitutional:  Negative for chills, fever and malaise/fatigue.   Respiratory:  Negative for shortness of breath.    Cardiovascular:  Negative for chest pain.   Gastrointestinal:  Negative for constipation, diarrhea, nausea and vomiting.   Skin:  Negative for rash.   Neurological:  Negative for weakness.   All other systems reviewed and are negative.       Medications:  Outpatient Encounter Medications as of 2023   Medication Sig Dispense Refill    acetaminophen (TYLENOL) 325 MG tablet Take 650 mg by mouth daily as needed (Gout pain).      allopurinoL (ZYLOPRIM) 300 MG tablet Take 1 tablet (300 mg total) by mouth once daily. 90 tablet 3    busPIRone (BUSPAR) 10 MG tablet Take 1 tablet (10 mg total) by mouth 2 (two) times daily. 60 tablet 11    cholecalciferol, vitamin D3, 125 mcg (5,000 unit) Tab Take 5,000 Units by mouth once daily.      colchicine, gout, (COLCRYS) 0.6 mg tablet TAKE 2 TABLET BY MOUTH ONCE, THEN TAKE 1 TABLET BY MOUTH 1 HOUR LATER THEN TAKE 1 TABLET BY MOUTH DAILY UNTIL GOUT EXACERBATION RESOLVED 90 tablet 1    hydrOXYzine pamoate (VISTARIL) 25 MG Cap SMARTSI-2 Capsule(s) By Mouth 1-2 Times Daily      lisinopriL (PRINIVIL,ZESTRIL) 20 MG tablet Take 1 tablet (20 mg total) by mouth once daily. 90 tablet 3    lurasidone (LATUDA) 20 mg Tab tablet Take 1 tablet (20 mg total) by mouth every evening. 30 tablet 11    magnesium glycinate-mag oxide 120 mg magnesium Cap Take 2 capsules by mouth once daily.      multivitamin (THERAGRAN) per tablet Take 1 tablet by mouth once daily.      omega-3 fatty acids/fish oil (FISH OIL-OMEGA-3 FATTY ACIDS) 300-1,000 mg capsule Take 1 capsule by mouth every morning.       rivaroxaban (XARELTO) 20 mg Tab Take 1 tablet (20 mg total) by mouth daily with dinner or evening meal. Starting on 7/27/2023, take one 20 mg tablet every evening with dinner. Last dose 12/21/2023. 90 tablet 3    verapamiL (CALAN-SR) 120 MG CR tablet Take 1 tablet (120 mg total) by mouth every evening. 90 tablet 3    [DISCONTINUED] ALPRAZolam (XANAX) 0.25 MG tablet Take 1 tablet (0.25 mg total) by mouth once. For MRI for 1 dose 1 tablet 0    [DISCONTINUED] dexmethylphenidate (FOCALIN XR) 10 MG 24 hr capsule Take 1 capsule once a day in the morning. 30 capsule 0    [DISCONTINUED] dexmethylphenidate (FOCALIN XR) 5 MG 24 hr capsule Take 10 mg by mouth.      [DISCONTINUED] lamoTRIgine (LAMICTAL) 25 MG tablet Take 50 mg by mouth once daily.      [DISCONTINUED] LORazepam (ATIVAN) 0.5 MG tablet Take 0.5 mg by mouth 2 (two) times daily as needed.      [DISCONTINUED] LORazepam (ATIVAN) 1 MG tablet Take 1 tablet (1 mg total) by mouth 2 (two) times daily as needed. 60 tablet 0    [DISCONTINUED] SPRAVATO 84 mg (28 mg x 3) Spry 56 mg by Nasal route once a week.       No facility-administered encounter medications on file as of 12/4/2023.       Allergies:  Review of patient's allergies indicates:  No Known Allergies    Health Maintenance:  Immunization History   Administered Date(s) Administered    COVID-19, MRNA, LN-S, PF (Pfizer) (Purple Cap) 08/10/2021    Influenza - Intradermal - Quadrivalent - PF 10/22/2014    Influenza - Intradermal - Trivalent - PF 10/22/2014    Influenza - Quadrivalent 10/19/2015    Influenza - Quadrivalent - PF *Preferred* (6 months and older) 01/30/2019    Tdap 11/07/2016      Health Maintenance   Topic Date Due    Colorectal Cancer Screening  05/06/2024    TETANUS VACCINE  11/07/2026    Lipid Panel  11/17/2027    Hepatitis C Screening  Completed        Physical Exam      Vital Signs  Pulse: 87  SpO2: 97 %  BP: 104/70  BP Location: Left arm  Patient Position: Sitting  Pain Score:   9  Pain Loc:  "Toe  Height and Weight  Height: 5' 9" (175.3 cm)  Weight: 105.8 kg (233 lb 4 oz)  BSA (Calculated - sq m): 2.27 sq meters  BMI (Calculated): 34.4  Weight in (lb) to have BMI = 25: 168.9]    Physical Exam  Vitals reviewed.   Constitutional:       Appearance: He is well-developed.   HENT:      Head: Normocephalic and atraumatic.      Right Ear: External ear normal.      Left Ear: External ear normal.   Cardiovascular:      Rate and Rhythm: Normal rate and regular rhythm.      Heart sounds: Normal heart sounds. No murmur heard.  Pulmonary:      Effort: Pulmonary effort is normal.      Breath sounds: Normal breath sounds. No wheezing or rales.   Abdominal:      General: Bowel sounds are normal.      Palpations: Abdomen is soft.          Laboratory:  CBC:  Recent Labs   Lab 07/05/23  0428 07/06/23  0503 10/23/23  1132   WBC 9.94  9.94 9.83  9.83 8.96   RBC 4.65  4.65 5.11  5.11 5.14   Hemoglobin 13.7 L  13.7 L 15.1  15.1 15.1   Hematocrit 40.7  40.7 44.6  44.6 46.2   Platelets 260  260 290  290 345   MCV 88  88 87  87 90   MCH 29.5  29.5 29.5  29.5 29.4   MCHC 33.7  33.7 33.9  33.9 32.7     CMP:  Recent Labs   Lab 05/15/23  1722 07/04/23  1821 07/05/23  0428 10/23/23  1132   Glucose 92 107   < > 117 H   Calcium 9.4 9.2   < > 9.7   Albumin 4.3 3.8  --  4.0   Total Protein 7.5 7.3  --  7.5   Sodium 139 137   < > 137   Potassium 3.9 4.3   < > 4.7   CO2 22 L 25   < > 23   Chloride 103 101   < > 103   BUN 14 16   < > 14   Alkaline Phosphatase 70 72  --  71   ALT 30 31  --  34   AST 19 20  --  21   Total Bilirubin 0.5 0.4  --  0.6    < > = values in this interval not displayed.     URINALYSIS:  Recent Labs   Lab 05/16/23  1317   Color, UA Yellow   Leukocytes, UA Negative   Urobilinogen, UA 1.0      LIPIDS:  Recent Labs   Lab 02/11/22  2248 09/27/22  1201 11/17/22  0842   TSH 1.821 1.147 1.154   HDL  --   --  52   Cholesterol  --   --  244 H   Triglycerides  --   --  74   LDL Cholesterol  --   --  177.2 H "   HDL/Cholesterol Ratio  --   --  21.3   Non-HDL Cholesterol  --   --  192   Total Cholesterol/HDL Ratio  --   --  4.7     TSH:  Recent Labs   Lab 02/11/22  2248 09/27/22  1201 11/17/22  0842   TSH 1.821 1.147 1.154     A1C:  Recent Labs   Lab 11/17/22  0842 07/05/23  0428   Hemoglobin A1C 6.0 H 5.8 H       Assessment/Plan     Wilbur Diop Jr. is a 43 y.o.male with:    1. Gout, unspecified cause, unspecified chronicity, unspecified site  - allopurinoL (ZYLOPRIM) 300 MG tablet; Take 1 tablet (300 mg total) by mouth once daily.  Dispense: 90 tablet; Refill: 3  Start allopurinol, overlap with colchicine.     Chronic conditions status updated as per HPI.  Other than changes above, cont current medications and maintain follow up with specialists.  No follow-ups on file.    Future Appointments   Date Time Provider Department Center   12/13/2023  8:30 AM Wilian Orozco MD [facility] OPHTHAL [place] [facility]   1/5/2024  8:30 AM LAB, [place] [facility]   1/8/2024  4:00 PM Quique Krishnan MD [facility] HEMONC3 Daniel[facility] Cance   2/14/2024  9:45 AM EKG, APPT [facility] EKG [facility] girish   2/14/2024 10:00 AM Carlos Daugherty MD [facility] ARRHYTH [place] girish Naranjo MD  [facility]

## 2023-12-04 NOTE — PATIENT INSTRUCTIONS
Take colchicine 1 tablet twice daily for 7 days, then go back to as needed for flare up.    Start allopurinol 1 tablet daily today.

## 2023-12-15 ENCOUNTER — TELEPHONE (OUTPATIENT)
Dept: OPHTHALMOLOGY | Facility: CLINIC | Age: 43
End: 2023-12-15
Payer: MEDICAID

## 2023-12-15 NOTE — TELEPHONE ENCOUNTER
----- Message from Mindy Tabares MA sent at 12/14/2023  4:32 PM CST -----  Contact: pt/ [phone]    ----- Message -----  From: Raquel Restrepo  Sent: 12/14/2023   4:24 PM CST  To: Pam Cedeno Staff    Type:  Patient Call    Who Called: Pt   Would the patient rather a call back or a response via [facility]? Call   Best Call Back Number:[phone]  Additional Information:  Per  pt is  requesting  an appointment,  please  call pt

## 2024-01-05 ENCOUNTER — LAB VISIT (OUTPATIENT)
Dept: LAB | Facility: HOSPITAL | Age: 44
End: 2024-01-05
Attending: INTERNAL MEDICINE
Payer: MEDICAID

## 2024-01-05 DIAGNOSIS — D68.59 THROMBOPHILIA: ICD-10-CM

## 2024-01-05 DIAGNOSIS — I26.99 PULMONARY EMBOLISM, UNSPECIFIED CHRONICITY, UNSPECIFIED PULMONARY EMBOLISM TYPE, UNSPECIFIED WHETHER ACUTE COR PULMONALE PRESENT: ICD-10-CM

## 2024-01-05 LAB — D DIMER PPP IA.FEU-MCNC: <0.19 MG/L FEU

## 2024-01-05 PROCEDURE — 85379 FIBRIN DEGRADATION QUANT: CPT | Performed by: INTERNAL MEDICINE

## 2024-01-05 PROCEDURE — 36415 COLL VENOUS BLD VENIPUNCTURE: CPT | Mod: PO | Performed by: INTERNAL MEDICINE

## 2024-01-08 ENCOUNTER — PATIENT MESSAGE (OUTPATIENT)
Dept: HEMATOLOGY/ONCOLOGY | Facility: CLINIC | Age: 44
End: 2024-01-08
Payer: MEDICAID

## 2024-01-19 ENCOUNTER — OFFICE VISIT (OUTPATIENT)
Dept: HEMATOLOGY/ONCOLOGY | Facility: CLINIC | Age: 44
End: 2024-01-19
Payer: MEDICAID

## 2024-01-19 DIAGNOSIS — D68.59 THROMBOPHILIA: ICD-10-CM

## 2024-01-19 DIAGNOSIS — I48.3 TYPICAL ATRIAL FLUTTER: ICD-10-CM

## 2024-01-19 DIAGNOSIS — I26.99 PULMONARY EMBOLISM, UNSPECIFIED CHRONICITY, UNSPECIFIED PULMONARY EMBOLISM TYPE, UNSPECIFIED WHETHER ACUTE COR PULMONALE PRESENT: Primary | ICD-10-CM

## 2024-01-19 PROCEDURE — 99214 OFFICE O/P EST MOD 30 MIN: CPT | Mod: 95,,, | Performed by: INTERNAL MEDICINE

## 2024-01-22 NOTE — PROGRESS NOTES
PATIENT: Wilbur Diop Jr.  MRN: [id number]  DATE: 1/19/2024    The patient location is: his home  The chief complaint leading to consultation is: follow-up PE    Visit type: audiovisual    Face to Face time with patient: 18min  Each patient to whom he or she provides medical services by telemedicine is:  (1) informed of the relationship between the physician and patient and the respective role of any other health care provider with respect to management of the patient; and (2) notified that he or she may decline to receive medical services by telemedicine and may withdraw from such care at any time.      Subjective:     Chief complaint:  Chief Complaint   Patient presents with    pulmonary embolism    Follow-up       Interval History: Mr. Diop participates remotely in telemedicine follow-up today.   He notes that he continues on Xarelto with good tolerance. He states that his activity levels have declined lately because he has been more depressed than he had been the last time we spoke; as a result of his depression he isn't getting much physical activity. He has now completed 6 months of anticoagulation for a provoked PE. Given his decreased activity lately I would worry that discontinuation of anticoagulation altogether at this time may result in recurrence of thrombosis. As such I have recommended that he remain on lower dose prophylactic anticoagulation at least for the time being--he is in agreement.       Review of Systems   A comprehensive review of systems was performed; pertinent positives and negatives are noted in the HPI.        ECOG Performance Status:   ECOG SCORE    1 - Restricted in strenuous activity-ambulatory and able to carry out work of a light nature         Objective:      Vitals: There were no vitals filed for this visit.  BMI: There is no height or weight on file to calculate BMI.      Physical Exam:   No physical exam due to remote nature of the visit.        Laboratory Data:  WBC    Date Value Ref Range Status   10/23/2023 8.96 3.90 - 12.70 K/uL Final     Hemoglobin   Date Value Ref Range Status   10/23/2023 15.1 14.0 - 18.0 g/dL Final     Hematocrit   Date Value Ref Range Status   10/23/2023 46.2 40.0 - 54.0 % Final     Platelets   Date Value Ref Range Status   10/23/2023 345 150 - 450 K/uL Final     Gran # (ANC)   Date Value Ref Range Status   10/23/2023 5.4 1.8 - 7.7 K/uL Final     Gran %   Date Value Ref Range Status   10/23/2023 60.1 38.0 - 73.0 % Final       Chemistry        Component Value Date/Time     10/23/2023 1132    K 4.7 10/23/2023 1132     10/23/2023 1132    CO2 23 10/23/2023 1132    BUN 14 10/23/2023 1132    CREATININE 0.8 10/23/2023 1132     (H) 10/23/2023 1132        Component Value Date/Time    CALCIUM 9.7 10/23/2023 1132    ALKPHOS 71 10/23/2023 1132    AST 21 10/23/2023 1132    ALT 34 10/23/2023 1132    BILITOT 0.6 10/23/2023 1132    ESTGFRAFRICA >105 08/23/2022 1203    ESTGFRAFRICA >60.0 03/07/2022 1413    EGFRNONAA >60.0 03/07/2022 1413              Assessment/Plan:     1. Pulmonary embolism, unspecified chronicity, unspecified pulmonary embolism type, unspecified whether acute cor pulmonale present    2. Thrombophilia    3. Typical atrial flutter      He had a possibly provoked PE in July 2023 and he has now completed 6 months of anticoagulation. No heritable or acquired primary thrombophilias have been identified however he is justifiably concerned about potential for recurrence. Currently he notes that his depression has been quite severe and as a result he has had very little activity and has been quite sedentary--given these risk factors I would favor maintaining prophylactic anticoagulation for the time being to reduce risk of recurrence and he is in agreement. We will decrease the Xarelto to 10mg daily. I'd like to follow-up with him in about 3 months to see how he is doing.     Med and Orders:  Orders Placed This Encounter    rivaroxaban  (XARELTO) 10 mg Tab       Follow Up:  Follow up in about 3 months (around 4/19/2024).      Above care plan was discussed with patient and all questions were addressed to their expressed satisfaction.       Quique Krisnhan MD, FACP  Hematology & Medical Oncology  [facility]

## 2024-02-14 ENCOUNTER — PATIENT MESSAGE (OUTPATIENT)
Dept: ELECTROPHYSIOLOGY | Facility: CLINIC | Age: 44
End: 2024-02-14
Payer: MEDICAID

## 2024-02-29 ENCOUNTER — HOSPITAL ENCOUNTER (EMERGENCY)
Facility: HOSPITAL | Age: 44
Discharge: HOME | End: 2024-02-29
Attending: EMERGENCY MEDICINE
Payer: MEDICAID

## 2024-02-29 VITALS
DIASTOLIC BLOOD PRESSURE: 77 MMHG | BODY MASS INDEX: 34.7 KG/M2 | TEMPERATURE: 98 F | HEART RATE: 75 BPM | SYSTOLIC BLOOD PRESSURE: 130 MMHG | RESPIRATION RATE: 16 BRPM | WEIGHT: 235 LBS | OXYGEN SATURATION: 95 %

## 2024-02-29 DIAGNOSIS — R42 DIZZINESS: ICD-10-CM

## 2024-02-29 DIAGNOSIS — H53.8 BLURRY VISION: Primary | ICD-10-CM

## 2024-02-29 DIAGNOSIS — R53.1 WEAKNESS: ICD-10-CM

## 2024-02-29 LAB
ALBUMIN SERPL BCP-MCNC: 4.2 G/DL (ref 3.5–5.2)
ALP SERPL-CCNC: 73 U/L (ref 55–135)
ALT SERPL W/O P-5'-P-CCNC: 32 U/L (ref 10–44)
ANION GAP SERPL CALC-SCNC: 12 MMOL/L (ref 8–16)
AST SERPL-CCNC: 20 U/L (ref 10–40)
BILIRUB SERPL-MCNC: 0.4 MG/DL (ref 0.1–1)
BNP SERPL-MCNC: 15 PG/ML (ref 0–99)
BUN SERPL-MCNC: 18 MG/DL (ref 6–20)
CALCIUM SERPL-MCNC: 10.1 MG/DL (ref 8.7–10.5)
CHLORIDE SERPL-SCNC: 101 MMOL/L (ref 95–110)
CK SERPL-CCNC: 94 U/L (ref 20–200)
CO2 SERPL-SCNC: 23 MMOL/L (ref 23–29)
CREAT SERPL-MCNC: 0.7 MG/DL (ref 0.5–1.4)
EST. GFR  (NO RACE VARIABLE): >60 ML/MIN/1.73 M^2
GLUCOSE SERPL-MCNC: 83 MG/DL (ref 70–110)
INR PPP: 1 (ref 0.8–1.2)
MAGNESIUM SERPL-MCNC: 2.1 MG/DL (ref 1.6–2.6)
POCT GLUCOSE: 90 MG/DL (ref 70–110)
POTASSIUM SERPL-SCNC: 3.9 MMOL/L (ref 3.5–5.1)
PROT SERPL-MCNC: 7.9 G/DL (ref 6–8.4)
PROTHROMBIN TIME: 10.3 SEC (ref 9–12.5)
SODIUM SERPL-SCNC: 136 MMOL/L (ref 136–145)
TROPONIN I SERPL DL<=0.01 NG/ML-MCNC: 0.01 NG/ML (ref 0–0.03)
TSH SERPL DL<=0.005 MIU/L-ACNC: 1.41 UIU/ML (ref 0.4–4)

## 2024-02-29 PROCEDURE — 25500020 PHARM REV CODE 255: Performed by: EMERGENCY MEDICINE

## 2024-02-29 PROCEDURE — 83880 ASSAY OF NATRIURETIC PEPTIDE: CPT | Performed by: STUDENT IN AN ORGANIZED HEALTH CARE EDUCATION/TRAINING PROGRAM

## 2024-02-29 PROCEDURE — 84484 ASSAY OF TROPONIN QUANT: CPT | Performed by: STUDENT IN AN ORGANIZED HEALTH CARE EDUCATION/TRAINING PROGRAM

## 2024-02-29 PROCEDURE — 93005 ELECTROCARDIOGRAM TRACING: CPT

## 2024-02-29 PROCEDURE — 84443 ASSAY THYROID STIM HORMONE: CPT | Performed by: STUDENT IN AN ORGANIZED HEALTH CARE EDUCATION/TRAINING PROGRAM

## 2024-02-29 PROCEDURE — 99285 EMERGENCY DEPT VISIT HI MDM: CPT | Mod: 25

## 2024-02-29 PROCEDURE — 85610 PROTHROMBIN TIME: CPT | Performed by: STUDENT IN AN ORGANIZED HEALTH CARE EDUCATION/TRAINING PROGRAM

## 2024-02-29 PROCEDURE — 82550 ASSAY OF CK (CPK): CPT | Performed by: STUDENT IN AN ORGANIZED HEALTH CARE EDUCATION/TRAINING PROGRAM

## 2024-02-29 PROCEDURE — 83735 ASSAY OF MAGNESIUM: CPT | Performed by: STUDENT IN AN ORGANIZED HEALTH CARE EDUCATION/TRAINING PROGRAM

## 2024-02-29 PROCEDURE — 82962 GLUCOSE BLOOD TEST: CPT

## 2024-02-29 PROCEDURE — 93010 ELECTROCARDIOGRAM REPORT: CPT | Mod: ,,, | Performed by: INTERNAL MEDICINE

## 2024-02-29 PROCEDURE — 80053 COMPREHEN METABOLIC PANEL: CPT | Performed by: STUDENT IN AN ORGANIZED HEALTH CARE EDUCATION/TRAINING PROGRAM

## 2024-02-29 RX ORDER — VENLAFAXINE 75 MG/1
75 TABLET ORAL 2 TIMES DAILY
COMMUNITY

## 2024-02-29 RX ORDER — MECLIZINE HYDROCHLORIDE 25 MG/1
25 TABLET ORAL 3 TIMES DAILY PRN
Qty: 20 TABLET | Refills: 0 | Status: SHIPPED | OUTPATIENT
Start: 2024-02-29 | End: 2024-03-07

## 2024-02-29 RX ORDER — ARIPIPRAZOLE 10 MG/1
5 TABLET ORAL DAILY
COMMUNITY

## 2024-02-29 RX ADMIN — IOHEXOL 100 ML: 350 INJECTION, SOLUTION INTRAVENOUS at 09:02

## 2024-02-29 NOTE — FIRST PROVIDER EVALUATION
"Medical screening examination initiated.  I have conducted a focused provider triage encounter, findings are as follows:    Brief history of present illness:  "I'm really confused right now"  Took shower today, 1pm started sweating and feeling lightheaded  Bp 137/93 at that time  Hx of aflutter and bl PE on rivaroxaban   Dizzy and lightheaded   Feels like R eye is blurry  Compliant with medication  Maybe some palpitations vs muscle contractions? Hx of cardiac issues but has had negative CT coronaries previously    Vitals:    02/29/24 1621   BP: (!) 173/92   BP Location: Right arm   Patient Position: Sitting   Pulse: 79   Resp: 16   Temp: 97.8 °F (36.6 °C)   TempSrc: Oral   SpO2: 97%   Weight: 106.6 kg (235 lb)       Pertinent physical exam:  ambulatory no acute distress       Brief workup plan:  normal strength in all extremities no weakness/numbness  No signs/sx of stroke    Preliminary workup initiated; this workup will be continued and followed by the physician or advanced practice provider that is assigned to the patient when roomed.  "

## 2024-03-01 LAB
OHS QRS DURATION: 82 MS
OHS QTC CALCULATION: 406 MS

## 2024-03-01 NOTE — DISCHARGE INSTRUCTIONS
Thank you for choosing [facility]!     Our goal in the Emergency Department is to always provide outstanding medical care. You may receive a survey by mail or e-mail in the next week regarding your experience today. We would greatly appreciate you completing and returning the survey. Your feedback provides us with a way to recognize our staff who provide very good care, and it helps us learn how to improve when your experience was below our aspiration of excellence.      It is important to remember that some problems are difficult to diagnose and may not be found during your first visit. Be sure to follow up with your primary care doctor and review any labs/imaging that was performed during your visit with them. If you do not have a primary care doctor, you may contact the one listed on your discharge paperwork, or you may also call the [facility] Appointment Desk at [phone] to schedule an appointment.     All medications may potentially have side effects and it is impossible to predict which medications may give you side effects. If you feel that you are having a negative effect of any medication you should immediately stop taking them and seek medical attention.  Do not drive or make any important decisions for 24 hours if you have received any pain medications, sedatives or mood altering drugs during your ER visit.    We appreciate you trusting us with your medical care. We will be happy to take care of you for all of your future medical needs. You may return to the ER at any time for any new/concerning symptoms, worsening condition, or failure to improve. We hope you feel better soon.     Sincerely,    Cleveland Melendrez Jr., MD  Board-Certified Emergency Medicine Physician  [facility]

## 2024-03-01 NOTE — ED NOTES
Patient states lightheaded, dizzy onset 1300, reports blurred vision right eye PTA< denies upon admit to ED , new rx Abilify started Effexor 1 month ago

## 2024-03-01 NOTE — ED NOTES
Patient identifiers verified and correct for  Mr Diop   C/C:  Dizziness SEE NN  APPEARANCE: awake and alert in NAD. PAIN  0/10  SKIN: warm, dry and intact. No breakdown or bruising.  MUSCULOSKELETAL: Patient moving all extremities spontaneously, no obvious swelling or deformities noted. Ambulates independently.  RESPIRATORY: Denies shortness of breath.Respirations unlabored.   CARDIAC: Denies CP, 2+ distal pulses; no peripheral edema  ABDOMEN: S/ND/NT, Denies nausea  : voids spontaneously, denies difficulty  Neurologic: AAO x 4; follows commands equal strength in all extremities; denies numbness/tingling. Positive dizzines and lightheaded PTA, right eye irritation

## 2024-03-01 NOTE — ED PROVIDER NOTES
"Emergency Department Encounter  Provider Note    Wilbur Diop Jr.  [id number]  2/29/2024    Evaluation:    History Acquisition:     Chief Complaint   Patient presents with    Dizziness     Episode of dizziness and confusion after shower this afternoon, Hx blood clot in lungs       History of Present Illness:  Wilbur Diop Jr. is a 44 y.o. male who has a past medical history of ADHD (attention deficit hyperactivity disorder), Allergy, Anxiety, Atrial fibrillation, Atrial flutter, Depression, History of psychiatric hospitalization, psychiatric care, Psychiatric problem, Supraventricular tachycardia, and Therapy.    The patient presents to the ED due to dizziness, light-headedness, and confusion.   Patient reports symptoms started around 1:00 this afternoon.  He was taking a shower and suddenly felt very sweaty and dizzy.   He states he started feeling confused, "like I was there but not really there."  He states he had some blurry vision in his R eye as well.   He was concerned it was due to his BP, so he checked it and it was 130/70. He decided to rest but his symptoms did not improve.  He denies any syncope, CP, palpitations, N/V/D, abdominal pain, headache, or any other symptoms.  He reports the symptoms lasted for about an hour then improved. On arrival to the ED he states he still feels "a little" confused but is oriented to person/place/location and is able to hold a normal conversation.  He reports a similar episode about a week ago while at work. He states he began to feel dizzy while walking and had to rest. He did no lose consciousness and his symptoms resolved after a short time also.  He is concerned because he was recently diagnosed with a blood clot in his lungs. He is compliant with     Additional historians utilized:  none    Prior medical records were reviewed:   [facility] visit 01/19 for follow-up PE.  Xarelto was continued.  PCP visit 12/2023 for follow-up SVT, atrial flutter, anxiety, " ADD, gout, BUDDY   Admitted 07/2023 for bilateral PE after long-distance drive    The patient's list of active medical history, family/social history, medications, and allergies as documented has been reviewed.     Past Medical History:   Diagnosis Date    ADHD (attention deficit hyperactivity disorder)     Allergy     Anxiety     Atrial fibrillation     Atrial flutter     Depression     History of psychiatric hospitalization     2002 for depression    Hx of psychiatric care     Psychiatric problem     Supraventricular tachycardia     Therapy      Past Surgical History:   Procedure Laterality Date    ABLATION, ATRIAL FIBRILLATION, CRYO N/A 01/24/2023    Procedure: Ablation, Atrial Fibrillation, Cryo;  Surgeon: Carlos Daugherty MD;  Location: [facility] EP LAB;  Service: Cardiology;  Laterality: N/A;  AF, PVI, CRYO, RAFAEL, CHANEL, GEN, DM, 3prep    APPENDECTOMY      HERNIA REPAIR      TRANSESOPHAGEAL ECHOCARDIOGRAPHY N/A 01/24/2023    Procedure: ECHOCARDIOGRAM, TRANSESOPHAGEAL;  Surgeon: Stacia Howell MD;  Location: [facility] EP LAB;  Service: Cardiology;  Laterality: N/A;     Family History   Problem Relation Age of Onset    Depression Mother     Anxiety disorder Mother     No Known Problems Father     No Known Problems Sister     No Known Problems Brother     No Known Problems Maternal Aunt     No Known Problems Maternal Uncle     No Known Problems Paternal Aunt     No Known Problems Paternal Uncle     No Known Problems Maternal Grandmother     Cancer Maternal Grandfather         Prostate cancer recovered    No Known Problems Paternal Grandmother     No Known Problems Paternal Grandfather     Amblyopia Neg Hx     Blindness Neg Hx     Cataracts Neg Hx     Diabetes Neg Hx     Glaucoma Neg Hx     Hypertension Neg Hx     Macular degeneration Neg Hx     Retinal detachment Neg Hx     Strabismus Neg Hx     Stroke Neg Hx     Thyroid disease Neg Hx      Social History     Socioeconomic History    Marital status:     Number of  children: 2   Tobacco Use    Smoking status: Former     Current packs/day: 0.00     Types: Cigarettes     Quit date: 10/22/2004     Years since quittin.3    Smokeless tobacco: Never   Substance and Sexual Activity    Alcohol use: Not Currently     Comment: once a month; socially    Drug use: No    Sexual activity: Not Currently     Partners: Female     Birth control/protection: None   Other Topics Concern    Patient feels they ought to cut down on drinking/drug use No    Patient annoyed by others criticizing their drinking/drug use No    Patient has felt bad or guilty about drinking/drug use No    Patient has had a drink/used drugs as an eye opener in the AM No     Social Determinants of Health     Financial Resource Strain: Low Risk  (2023)    Overall Financial Resource Strain (CARDIA)     Difficulty of Paying Living Expenses: Not very hard   Food Insecurity: No Food Insecurity (2023)    Hunger Vital Sign     Worried About Running Out of Food in the Last Year: Never true     Ran Out of Food in the Last Year: Never true   Transportation Needs: No Transportation Needs (2023)    PRAPARE - Transportation     Lack of Transportation (Medical): No     Lack of Transportation (Non-Medical): No   Physical Activity: Sufficiently Active (2023)    Exercise Vital Sign     Days of Exercise per Week: 6 days     Minutes of Exercise per Session: 30 min   Stress: No Stress Concern Present (2023)    [employer] [place] - Occupational Stress Questionnaire     Feeling of Stress : Only a little   Social Connections: Moderately Isolated (2023)    Social Connection and Isolation Panel [NHANES]     Frequency of Communication with Friends and Family: More than three times a week     Frequency of Social Gatherings with Friends and Family: More than three times a week     Attends Sabianism Services: Never     Active Member of Clubs or Organizations: No     Attends Club or Organization Meetings:  Never     Marital Status:    Housing Stability: Low Risk  (2023)    Housing Stability Vital Sign     Unable to Pay for Housing in the Last Year: No     Number of Places Lived in the Last Year: 1     Unstable Housing in the Last Year: No       Medications:  Discharge Medication List as of 2024 11:12 PM        START taking these medications    Details   meclizine (ANTIVERT) 25 mg tablet Take 1 tablet (25 mg total) by mouth 3 (three) times daily as needed for Dizziness., Starting Thu 2024, Until Thu 3/7/2024 at 2359, Print           CONTINUE these medications which have NOT CHANGED    Details   acetaminophen (TYLENOL) 325 MG tablet Take 650 mg by mouth daily as needed (Gout pain)., Historical Med      allopurinoL (ZYLOPRIM) 300 MG tablet Take 1 tablet (300 mg total) by mouth once daily., Starting 2023, Normal      ARIPiprazole (ABILIFY) 10 MG Tab Take 5 mg by mouth once daily., Historical Med      busPIRone (BUSPAR) 10 MG tablet Take 1 tablet (10 mg total) by mouth 2 (two) times daily., Starting 2023, Historical Med      cholecalciferol, vitamin D3, 125 mcg (5,000 unit) Tab Take 5,000 Units by mouth once daily., Historical Med      colchicine, gout, (COLCRYS) 0.6 mg tablet TAKE 2 TABLET BY MOUTH ONCE, THEN TAKE 1 TABLET BY MOUTH 1 HOUR LATER THEN TAKE 1 TABLET BY MOUTH DAILY UNTIL GOUT EXACERBATION RESOLVED, Normal      hydrOXYzine pamoate (VISTARIL) 25 MG Cap SMARTSI-2 Capsule(s) By Mouth 1-2 Times Daily, Historical Med      lisinopriL (PRINIVIL,ZESTRIL) 20 MG tablet Take 1 tablet (20 mg total) by mouth once daily., Starting Mon 10/2/2023, Until Tue 10/1/2024, Normal      lurasidone (LATUDA) 20 mg Tab tablet Take 1 tablet (20 mg total) by mouth every evening., Starting 2023, Historical Med      magnesium glycinate-mag oxide 120 mg magnesium Cap Take 2 capsules by mouth once daily., Historical Med      multivitamin (THERAGRAN) per tablet Take 1 tablet by mouth once  daily., Historical Med      omega-3 fatty acids/fish oil (FISH OIL-OMEGA-3 FATTY ACIDS) 300-1,000 mg capsule Take 1 capsule by mouth every morning., Historical Med      rivaroxaban (XARELTO) 10 mg Tab Take 1 tablet (10 mg total) by mouth daily with dinner or evening meal. Starting on 7/27/2023, take one 20 mg tablet every evening with dinner. Last dose 12/21/2023., Starting Fri 1/19/2024, Until Fri 8/16/2024, Normal      venlafaxine (EFFEXOR) 75 MG tablet Take 75 mg by mouth 2 (two) times daily., Historical Med      verapamiL (CALAN-SR) 120 MG CR tablet Take 1 tablet (120 mg total) by mouth every evening., Starting Thu 9/28/2023, Until Fri 9/27/2024, Normal             Allergies:  Review of patient's allergies indicates:  No Known Allergies    Review of Systems   Eyes:  Positive for visual disturbance.   Neurological:  Positive for dizziness and light-headedness.         Physical Exam:     Initial Vitals [02/29/24 1621]   BP Pulse Resp Temp SpO2   (!) 173/92 79 16 97.8 °F (36.6 °C) 97 %      MAP       --         Physical Exam    Nursing note and vitals reviewed.  Constitutional: He appears well-developed and well-nourished. He is not diaphoretic. No distress.   Well-appearing, in no distress   HENT:   Head: Normocephalic and atraumatic.   Mouth/Throat: Oropharynx is clear and moist.   Eyes: EOM are normal. Pupils are equal, round, and reactive to light.   Neck: No tracheal deviation present.   Cardiovascular:  Normal rate, regular rhythm, normal heart sounds and intact distal pulses.           Pulmonary/Chest: Breath sounds normal. No stridor. No respiratory distress.   Abdominal: Abdomen is soft. He exhibits no distension and no mass. There is no abdominal tenderness.   Musculoskeletal:         General: No edema. Normal range of motion.     Neurological: He is alert and oriented to person, place, and time. He has normal strength. He is not disoriented. No cranial nerve deficit or sensory deficit. He exhibits normal  muscle tone. GCS eye subscore is 4. GCS verbal subscore is 5. GCS motor subscore is 6.   No focal weakness   Skin: Skin is warm and dry. Capillary refill takes less than 2 seconds. No rash noted.   Psychiatric: He has a normal mood and affect. His behavior is normal. Thought content normal.         Differential Diagnoses:   Based on available information and initial assessment, Differential Diagnosis includes, but is not limited to:  Peripheral vertigo (labyrinthitis, vestibular neuritis, BPPV, Meniere's disease), cerebellar stroke/CVA, TIA, SAH, carotid artery dissection, intracranial mass, medication reaction/noncompliance, substance abuse, electrolyte abnormality, anemia, hemorrhage, renal failure, hepatic failure, sepsis/infection, UTI, viral illness, arrhythmia, CHF, thyroid disease, dehydration, depression, chronic disease.      ED Management:   Procedures    Orders Placed This Encounter    CTA Head and Neck (xpd)    Brain natriuretic peptide    Comprehensive metabolic panel    Magnesium    Protime-INR    Troponin I    TSH    CPK    Ambulatory referral/consult to ENT    EKG 12-lead    EKG 12-lead    Insert Saline lock IV    POCT glucose    iohexoL (OMNIPAQUE 350) injection 100 mL    meclizine (ANTIVERT) 25 mg tablet          EKG:   EKG interpretation by ED attending physician:  NSR, rate 82, no ST changes, no ischemia, normal intervals.  Compared with prior EKG dated 10/2023, grossly stable without significant change.      Labs:     Labs Reviewed   B-TYPE NATRIURETIC PEPTIDE   COMPREHENSIVE METABOLIC PANEL   MAGNESIUM   PROTIME-INR   TROPONIN I   TSH   CK   POCT GLUCOSE     Independent review of the labs ordered include:   See ED course    Imaging:     Imaging Results              CTA Head and Neck (xpd) (Final result)  Result time 02/29/24 22:53:31      Final result by Ilya Jolley MD (02/29/24 22:53:31)                   Impression:      No acute abnormality. No high-grade stenosis or major vessel  occlusion.    If the patient has an acute, focal neurological deficit, MRI of the brain may be indicated.      Electronically signed by: Ilya Jolley MD  Date:    02/29/2024  Time:    22:53               Narrative:    EXAMINATION:  CTA HEAD AND NECK (XPD)    CLINICAL HISTORY:  Diplopia;Transient ischemic attack (TIA);    TECHNIQUE:  Non contrast low dose axial images were obtained thought the head. CT angiogram was performed from the level of the [place] to the top of the head following the IV administration of 100 mL of Omnipaque 350.   Sagittal and coronal reconstructions and maximum intensity projection reconstructions were performed. Arterial stenosis percentages are based on NASCET measurement criteria.  MIP images also obtained.    COMPARISON:  MRI, 10/16/2023.    FINDINGS:  CT HEAD:    No evidence of acute territorial infarct, hemorrhage, mass effect, or midline shift.    Probable remote lacunar infarct or prominent perivascular space in the right basal ganglia, similar to prior MRI.  Minimal patchy periventricular white matter hypoattenuation better evaluated on prior MRI.    Ventricles are similar in size and configuration without acute hydrocephalus.    No displaced calvarial fracture.    Visualized paranasal sinuses and mastoid air cells are clear.    CTA HEAD AND NECK:    Soft tissues: No acute abnormality.    Aorta: 2 vessel aortic arch with common origin of the brachiocephalic and left common carotid arteries.    Extracranial carotid circulation: No hemodynamically significant stenosis, aneurysmal dilatation, or dissection.    Extracranial vertebral circulation: No hemodynamically significant stenosis, aneurysmal dilatation, or dissection.    Intracranial Arteries: No focal high-grade stenosis, occlusion, or aneurysm.    Venous structures (limited evaluation): Normal.                                         Medications Given:     Medications   iohexoL (OMNIPAQUE 350) injection 100 mL (100 mLs  Intravenous Given 2/29/24 2122)        Medical Decision Making:    Additional Consideration:   Additional testing considered during clinical course: none    Social determinants of health considered during development of treatment plan include: poor access to care    Current co-morbidities considered which impacted clinical decision making: PE on Xarelto, a-fib/flutter, SVT, anxiety/depression, BUDDY     Case discussed with additional provider: none    ED Course as of 03/01/24 0131   u Feb 29, 2024   1631 EKG 12-lead  No STEMI [AC]   2103 SpO2: 95 % [SS]   2103 Resp: 16 [SS]   2103 Pulse: 75 [SS]   2103 Temp Source: Oral [SS]   2103 Temp: 98.3 °F (36.8 °C) [SS]   2103 BP: 130/77  Vitals reassuring, normal pulse ox [SS]   2103 TSH  WNL [SS]   2103 CPK  WNL [SS]   2103 Magnesium  WNL [SS]   2103 Troponin I  WNL [SS]   2103 Comprehensive metabolic panel  WNL [SS]   2103 Brain natriuretic peptide  WNL [SS]   2103 Protime-INR  WNL [SS]   2103 POCT glucose  WNL [SS]   2318 CTA Head and Neck (xpd)  CT head independently interpreted: no intracranial hemorrhage, mass effect, or midline shift. No LVO.  Agree with radiologist interpretation.    [SS]      ED Course User Index  [AC] Devyn Loera., DO  [SS] Cleveland Melendrez MD            Medical Decision Making  45 yo M with PE on Xarelto, a-fib/flutter, SVT, anxiety/depression, BUDDY presents to ED after an episode of dizziness, lightheadedness, blurry vision that largely resolved prior to arrival.  Given history of PE, out of abundance of caution obtain labs including CTA head and neck to rule out vascular cause.  Vitals remained stable.  Workup largely unremarkable.  Given recurrent episodes, possible component of vertigo.  Presentation is atypical for TIA.  No evidence of cardiac arrhythmia in the ED. Will discharge with meclizine and referral to ENT.  Recommend PCP follow-up as well as continuing anticoagulation.  Return precautions given.    Problems Addressed:  Blurry  vision: self-limited or minor problem  Dizziness: acute illness or injury  Weakness: acute illness or injury    Amount and/or Complexity of Data Reviewed  External Data Reviewed: notes.  Labs: ordered. Decision-making details documented in ED Course.  Radiology: ordered and independent interpretation performed. Decision-making details documented in ED Course.  ECG/medicine tests: ordered and independent interpretation performed. Decision-making details documented in ED Course.    Risk  Prescription drug management.  Diagnosis or treatment significantly limited by social determinants of health.        Clinical Impression:       ICD-10-CM ICD-9-CM   1. Blurry vision  H53.8 368.8   2. Weakness  R53.1 780.79   3. Dizziness  R42 780.4       Discharge Medications:  Discharge Medication List as of 2/29/2024 11:12 PM        START taking these medications    Details   meclizine (ANTIVERT) 25 mg tablet Take 1 tablet (25 mg total) by mouth 3 (three) times daily as needed for Dizziness., Starting Thu 2/29/2024, Until Thu 3/7/2024 at 2359, Print               Follow-up Information       Follow up With Specialties Details Why Contact Info    Ge Naranjo MD Internal Medicine Schedule an appointment as soon as possible for a visit   [id number] [place] [address] SHASHANK RAMIREZ [id number]  [phone]               ED Disposition Condition    Discharge Stable              On re-evaluation, the patient's status has improved.  PCP/ENT follow-up as soon as possible was recommended.    After taking into careful account the patient's history, physical exam findings, as well as empirical and objective data obtained throughout ED workup, I feel no emergent medical condition has been identified. No further evaluation or admission was felt to be required, and the patient is stable for discharge from the ED. The patient and any additional family present were updated with test results, overall clinical impression, and recommended further plan of care,  including discharge instructions as provided and outpatient follow-up for continued evaluation and management as needed. All questions were answered. The patient expressed understanding and agreed with current plan for discharge and follow-up plan of care. Strict ED return precautions were provided, including return/worsening of current symptoms, new symptoms, or any other concerns.       Cleveland Melendrez MD  03/01/24 0133

## 2024-03-04 PROBLEM — R07.2 PRECORDIAL PAIN: Status: ACTIVE | Noted: 2024-03-04

## 2024-03-08 ENCOUNTER — HOSPITAL ENCOUNTER (OUTPATIENT)
Dept: CARDIOLOGY | Facility: CLINIC | Age: 44
Discharge: HOME | End: 2024-03-08
Payer: MEDICAID

## 2024-03-08 ENCOUNTER — OFFICE VISIT (OUTPATIENT)
Dept: ELECTROPHYSIOLOGY | Facility: CLINIC | Age: 44
End: 2024-03-08
Payer: MEDICAID

## 2024-03-08 VITALS
SYSTOLIC BLOOD PRESSURE: 122 MMHG | DIASTOLIC BLOOD PRESSURE: 84 MMHG | WEIGHT: 240.31 LBS | HEIGHT: 69 IN | BODY MASS INDEX: 35.59 KG/M2 | HEART RATE: 92 BPM

## 2024-03-08 DIAGNOSIS — I48.0 PAROXYSMAL ATRIAL FIBRILLATION: Primary | ICD-10-CM

## 2024-03-08 DIAGNOSIS — I47.10 PSVT (PAROXYSMAL SUPRAVENTRICULAR TACHYCARDIA): ICD-10-CM

## 2024-03-08 DIAGNOSIS — I48.0 PAROXYSMAL ATRIAL FIBRILLATION: ICD-10-CM

## 2024-03-08 DIAGNOSIS — F41.1 GAD (GENERALIZED ANXIETY DISORDER): ICD-10-CM

## 2024-03-08 LAB
OHS QRS DURATION: 88 MS
OHS QTC CALCULATION: 435 MS

## 2024-03-08 PROCEDURE — 3074F SYST BP LT 130 MM HG: CPT | Mod: CPTII,,, | Performed by: INTERNAL MEDICINE

## 2024-03-08 PROCEDURE — 99999 PR PBB SHADOW E&M-EST. PATIENT-LVL III: CPT | Mod: PBBFAC,,, | Performed by: INTERNAL MEDICINE

## 2024-03-08 PROCEDURE — 93010 ELECTROCARDIOGRAM REPORT: CPT | Mod: S$PBB,,, | Performed by: INTERNAL MEDICINE

## 2024-03-08 PROCEDURE — 3008F BODY MASS INDEX DOCD: CPT | Mod: CPTII,,, | Performed by: INTERNAL MEDICINE

## 2024-03-08 PROCEDURE — 99213 OFFICE O/P EST LOW 20 MIN: CPT | Mod: PBBFAC,25 | Performed by: INTERNAL MEDICINE

## 2024-03-08 PROCEDURE — 1159F MED LIST DOCD IN RCRD: CPT | Mod: CPTII,,, | Performed by: INTERNAL MEDICINE

## 2024-03-08 PROCEDURE — 3079F DIAST BP 80-89 MM HG: CPT | Mod: CPTII,,, | Performed by: INTERNAL MEDICINE

## 2024-03-08 PROCEDURE — 93005 ELECTROCARDIOGRAM TRACING: CPT | Mod: PBBFAC | Performed by: INTERNAL MEDICINE

## 2024-03-08 PROCEDURE — 99215 OFFICE O/P EST HI 40 MIN: CPT | Mod: S$PBB,,, | Performed by: INTERNAL MEDICINE

## 2024-03-08 PROCEDURE — 1160F RVW MEDS BY RX/DR IN RCRD: CPT | Mod: CPTII,,, | Performed by: INTERNAL MEDICINE

## 2024-03-08 PROCEDURE — 4010F ACE/ARB THERAPY RXD/TAKEN: CPT | Mod: CPTII,,, | Performed by: INTERNAL MEDICINE

## 2024-03-08 RX ORDER — LOSARTAN POTASSIUM 50 MG/1
1 TABLET ORAL DAILY
COMMUNITY
Start: 2024-03-05 | End: 2025-03-05

## 2024-03-08 RX ORDER — LORAZEPAM 0.5 MG/1
0.5 TABLET ORAL 2 TIMES DAILY
COMMUNITY
Start: 2024-01-29

## 2024-03-08 RX ORDER — ZOLPIDEM TARTRATE 10 MG/1
10 TABLET ORAL
COMMUNITY

## 2024-03-08 RX ORDER — ARIPIPRAZOLE 2 MG/1
2 TABLET ORAL
COMMUNITY
Start: 2024-02-23

## 2024-03-08 NOTE — PROGRESS NOTES
Subjective:   Patient ID:  Wilbur Diop Jr. is a 44 y.o. male who presents for follow up of Dizziness  .   Mr. Diop is a 44 y.o. male with anxiety/depression, AFl (RFA of CTI 3/2022), pAF (cryo PVI 1/24/2023), HTN here for follow up.    Background:  anxiety, depression; on meds  AFL, s/p RF CTI  PAF, on flecainide      RF of AFL 3/22. During that case, AFL degenerated to AF at times. SVT was not inducible with up to DAES.  Subsequently, had PAF requiring hospital visit and then monitoring showed 4% AF. Started flecainide.  Since flecainide, doing well.  1/24/2023: cryo PVI  6/29/2023: He is now 5 months s/p cryo PVI. He is doing well from a rhythm standpoint, with no documented or symptomatic recurrence of arrhythmia since procedure.  Off xarelto. CHADSVASc 1 (HTN). Will start ASA for now. Can stop flecainide at this time. He snores - will refer for sleep study.    Update (03/08/2024):  7/4/2023: Wilbur Diop is 43 y.o. male admitted to observation for bilateral PE.  Transitioned to therapeutic oral AC with Xarelto 15 mg BID x21 days followed by 20 mg nightly to complete 6-month course.   Hypercoag workup negative; taking 1/2 dose xarelto though.  No arrhythmias. Did have some vertigo; got meclizine.    I have personally reviewed the patient's EKG today, which shows sinus rhythm at 94bpm. MT interval is 138. QRS is 84. QTc is 427.    Relevant Cardiac Test Results:    2D Echo (7/5/2023):  The left ventricle is normal in size with normal systolic function.  The estimated ejection fraction is 63%.  Normal left ventricular diastolic function.  Normal right ventricular size with normal right ventricular systolic function.  Mild right atrial enlargement.  Intermediate central venous pressure (8 mmHg).  The estimated PA systolic pressure is 24 mmHg.    Current Outpatient Medications   Medication Sig    acetaminophen (TYLENOL) 325 MG tablet Take 650 mg by mouth daily as needed (Gout pain).    cholecalciferol,  vitamin D3, 125 mcg (5,000 unit) Tab Take 5,000 Units by mouth once daily.    dexmethylphenidate (FOCALIN XR) 10 MG 24 hr capsule Take 1 capsule once a day in the morning.    hydrOXYzine pamoate (VISTARIL) 25 MG Cap SMARTSI-2 Capsule(s) By Mouth 1-2 Times Daily    lisinopriL (PRINIVIL,ZESTRIL) 20 MG tablet Take 1 tablet (20 mg total) by mouth once daily. (Patient taking differently: Take 20 mg by mouth every evening.)    LORazepam (ATIVAN) 0.5 MG tablet Take 0.5 mg by mouth 2 (two) times daily as needed.    magnesium glycinate-mag oxide 120 mg magnesium Cap Take 2 capsules by mouth once daily.    multivitamin (THERAGRAN) per tablet Take 1 tablet by mouth once daily.    omega-3 fatty acids/fish oil (FISH OIL-OMEGA-3 FATTY ACIDS) 300-1,000 mg capsule Take 1 capsule by mouth every morning.    rivaroxaban (XARELTO) 15 mg Tab Take 1 tablet (15 mg total) by mouth 2 (two) times daily with meals for 21 days. Last dose 2023.    [START ON 2023] rivaroxaban (XARELTO) 20 mg Tab Take 1 tablet (20 mg total) by mouth daily with dinner or evening meal. Starting on 2023, take one 20 mg tablet every evening with dinner. Last dose 2023.    SPRAVATO 84 mg (28 mg x 3) Spry 56 mg by Nasal route once a week.    verapamiL (CALAN-SR) 120 MG CR tablet Take 1 tablet (120 mg total) by mouth every evening.    dexmethylphenidate (FOCALIN XR) 5 MG 24 hr capsule Take 10 mg by mouth.    lamoTRIgine (LAMICTAL) 25 MG tablet Take 50 mg by mouth once daily.     No current facility-administered medications for this visit.       Review of Systems   Constitutional: Positive for weight gain. Negative for malaise/fatigue.   Cardiovascular:  Positive for dyspnea on exertion and irregular heartbeat. Negative for chest pain, leg swelling and palpitations.   Respiratory:  Negative for shortness of breath.    Hematologic/Lymphatic: Negative for bleeding problem.   Skin:  Negative for rash.   Musculoskeletal:  Positive for joint pain.  "Negative for myalgias.   Gastrointestinal:  Negative for hematemesis, hematochezia and nausea.   Genitourinary:  Negative for hematuria.   Neurological:  Positive for numbness. Negative for light-headedness.   Psychiatric/Behavioral:  Positive for depression. Negative for altered mental status. The patient is nervous/anxious.    Allergic/Immunologic: Negative for persistent infections.       Objective:          /84   Pulse 92   Ht 5' 9" (1.753 m)   Wt 109 kg (240 lb 4.8 oz)   BMI 35.49 kg/m²     Physical Exam  Vitals and nursing note reviewed.   Constitutional:       Appearance: Normal appearance. He is well-developed.   HENT:      Head: Normocephalic and atraumatic.      Nose: Nose normal.   Eyes:      General: Lids are normal. No scleral icterus.     Conjunctiva/sclera: Conjunctivae normal.      Pupils: Pupils are equal, round, and reactive to light.   Neck:      Thyroid: No thyromegaly.      Vascular: No JVD.      Trachea: No tracheal deviation.   Cardiovascular:      Rate and Rhythm: Normal rate and regular rhythm.      Pulses:           Radial pulses are 2+ on the right side and 2+ on the left side.   Pulmonary:      Effort: Pulmonary effort is normal. No tachypnea, accessory muscle usage or respiratory distress.      Breath sounds: No wheezing.   Abdominal:      General: There is no distension.   Musculoskeletal:         General: Normal range of motion.      Cervical back: Normal range of motion.   Skin:     General: Skin is warm and dry.      Findings: No erythema.   Neurological:      Mental Status: He is alert and oriented to person, place, and time.      Motor: No abnormal muscle tone.      Deep Tendon Reflexes: Reflexes are normal and symmetric.   Psychiatric:         Speech: Speech normal.         Behavior: Behavior normal.           Lab Results   Component Value Date     02/29/2024    K 3.9 02/29/2024    MG 2.1 02/29/2024    BUN 18 02/29/2024    CREATININE 0.7 02/29/2024    ALT 32 " 02/29/2024    AST 20 02/29/2024    HGB 15.1 10/23/2023    HCT 46.2 10/23/2023    TSH 1.411 02/29/2024    LDLCALC 177.2 (H) 11/17/2022       Recent Labs   Lab 03/07/22  1413 01/17/23  0905 07/04/23 2029 02/29/24  1805   INR 1.0 1.1 1.0 1.0         Assessment:     1. Paroxysmal atrial fibrillation    2. PSVT (paroxysmal supraventricular tachycardia)    3. RACHEL (generalized anxiety disorder)      Plan:     Doing well s/p PVI, which was c/b PE (now s/p 6 mos full dose Xarelto; now on 1/2 dose).    d/c verapamil  Encouraged exercise  Return in 1 year, or earlier prn.

## 2024-03-24 ENCOUNTER — PATIENT MESSAGE (OUTPATIENT)
Dept: HEMATOLOGY/ONCOLOGY | Facility: CLINIC | Age: 44
End: 2024-03-24
Payer: MEDICAID

## 2024-03-25 DIAGNOSIS — I26.99 PULMONARY EMBOLISM, UNSPECIFIED CHRONICITY, UNSPECIFIED PULMONARY EMBOLISM TYPE, UNSPECIFIED WHETHER ACUTE COR PULMONALE PRESENT: Primary | ICD-10-CM

## 2024-03-26 ENCOUNTER — LAB VISIT (OUTPATIENT)
Dept: LAB | Facility: HOSPITAL | Age: 44
End: 2024-03-26
Attending: INTERNAL MEDICINE
Payer: MEDICAID

## 2024-03-26 DIAGNOSIS — I26.99 PULMONARY EMBOLISM, UNSPECIFIED CHRONICITY, UNSPECIFIED PULMONARY EMBOLISM TYPE, UNSPECIFIED WHETHER ACUTE COR PULMONALE PRESENT: ICD-10-CM

## 2024-03-26 LAB — D DIMER PPP IA.FEU-MCNC: <0.19 MG/L FEU

## 2024-03-26 PROCEDURE — 36415 COLL VENOUS BLD VENIPUNCTURE: CPT | Mod: PO | Performed by: INTERNAL MEDICINE

## 2024-03-26 PROCEDURE — 85379 FIBRIN DEGRADATION QUANT: CPT | Performed by: INTERNAL MEDICINE

## 2024-03-28 ENCOUNTER — OFFICE VISIT (OUTPATIENT)
Dept: URGENT CARE | Facility: CLINIC | Age: 44
End: 2024-03-28
Payer: MEDICAID

## 2024-03-28 VITALS
TEMPERATURE: 98 F | DIASTOLIC BLOOD PRESSURE: 93 MMHG | HEART RATE: 98 BPM | RESPIRATION RATE: 16 BRPM | WEIGHT: 240 LBS | OXYGEN SATURATION: 97 % | SYSTOLIC BLOOD PRESSURE: 134 MMHG | HEIGHT: 69 IN | BODY MASS INDEX: 35.55 KG/M2

## 2024-03-28 DIAGNOSIS — J06.9 VIRAL URI WITH COUGH: Primary | ICD-10-CM

## 2024-03-28 DIAGNOSIS — R05.9 COUGH, UNSPECIFIED TYPE: ICD-10-CM

## 2024-03-28 LAB
CTP QC/QA: YES
CTP QC/QA: YES
POC MOLECULAR INFLUENZA A AGN: NEGATIVE
POC MOLECULAR INFLUENZA B AGN: NEGATIVE
SARS-COV-2 AG RESP QL IA.RAPID: NEGATIVE

## 2024-03-28 PROCEDURE — 99214 OFFICE O/P EST MOD 30 MIN: CPT | Mod: S$GLB,,,

## 2024-03-28 PROCEDURE — 87811 SARS-COV-2 COVID19 W/OPTIC: CPT | Mod: QW,S$GLB,,

## 2024-03-28 PROCEDURE — 87502 INFLUENZA DNA AMP PROBE: CPT | Mod: QW,S$GLB,,

## 2024-03-28 PROCEDURE — 71046 X-RAY EXAM CHEST 2 VIEWS: CPT | Mod: FY,S$GLB,, | Performed by: RADIOLOGY

## 2024-03-28 RX ORDER — BENZONATATE 200 MG/1
200 CAPSULE ORAL 3 TIMES DAILY PRN
Qty: 30 CAPSULE | Refills: 0 | Status: SHIPPED | OUTPATIENT
Start: 2024-03-28 | End: 2024-04-07

## 2024-03-28 NOTE — PATIENT INSTRUCTIONS
- Rest.    - Drink plenty of fluids. Increasing your fluid intake will help loosen up mucous.  - Viral upper respiratory infections typically run their course in 10-14 days.      - You can take over-the-counter claritin, zyrtec, allegra, OR xyzal as directed. These are antihistamines that can help with runny nose, nasal congestion, sneezing, and helps to dry up post-nasal drip, which usually causes sore throat and cough.    - Plain mucinex will help break up congestion and keep it loose.     - You can use Flonase (fluticasone) nasal spray as directed for sinus congestion and postnasal drip. This is a steroid nasal spray that works locally over time to decrease the inflammation in your nose/sinuses and help with allergic symptoms. This is not an quick- relief spray like afrin, but it works well if used daily.  Discontinue if you develop nose bleed  - Use nasal saline prior to Flonase.  - Use Ocean Spray Nasal Saline 1-3 puffs each nostril every 2-3 hours then blow out onto tissue. This is to irrigate the nasal passage way to clear the sinus openings. Use until sinus problem resolved.    - A Neti Pot with sterile saline can help break up nasal congestion and give relief.      - Chloraseptic throat spray can help numb the throat.     - Warm salt water gargles can help with sore throat.  - Warm tea with honey can help with sore throat and cough. Honey is a natural cough suppressant.     - Acetaminophen (tylenol) or Ibuprofen (advil,motrin) as directed as needed for fever/pain. Avoid tylenol if you have a history of liver disease. Do not take ibuprofen if you have a history of GI bleeding, kidney disease, or if you take blood thinners.   - Ibuprofen dosing for adults: 400 mg by mouth every 4-6 hours as needed. Max: 2400 mg/day; Info: use lowest effective dose, shortest effective treatment duration; give w/ food if GI upset occurs.  - Tylenol dosing for adults: [By mouth route, immediate-release form] Dose: 325-1000 mg by  mouth every 4-6h as needed; Max: 1 g/4h and 4 g/day from all sources. [By mouth route, extended-release form] Dose: 650-1300 mg Extended Release by mouth every 8h as needed; Max: 4 g/day from all sources.     - You must understand that you have received an Urgent Care treatment only and that you may be released before all of your medical problems are known or treated.   - You, the patient, will arrange for follow up care as instructed.   - If your condition worsens or fails to improve we recommend that you receive another evaluation at the ER immediately or contact your PCP to discuss your concerns or return here.   - Follow up with your PCP or specialty clinic as directed in the next 1-2 weeks if not improved or as needed.  You can call [phone] to schedule an appointment with the appropriate provider.    If your symptoms do not improve or worsen, go to the emergency room immediately.

## 2024-03-28 NOTE — PROGRESS NOTES
"Subjective:      Patient ID: Wilbur Diop Jr. is a 44 y.o. male.    Vitals:  height is 5' 9" (1.753 m) and weight is 108.9 kg (240 lb). His temperature is 98.3 °F (36.8 °C). His blood pressure is 134/93 (abnormal) and his pulse is 98. His respiration is 16 and oxygen saturation is 97%.     Chief Complaint: Cough    Patient presents to the clinic with cough, shortness of breath, wheezing x two days. Patient requests a chest X-Ray. He has been taking delsym, nyquil for symptoms with mild relief. Has hx of walking pneumonia.     Cough  This is a new problem. The current episode started in the past 7 days. The problem has been gradually worsening. The cough is Productive of sputum. Associated symptoms include chills, headaches, myalgias, nasal congestion, postnasal drip, shortness of breath and wheezing. Pertinent negatives include no sore throat. He has tried nothing for the symptoms. His past medical history is significant for asthma, bronchitis and pneumonia.       Constitution: Positive for appetite change, chills and sweating.   HENT:  Positive for congestion and postnasal drip. Negative for sore throat.    Respiratory:  Positive for cough, sputum production, shortness of breath and wheezing.    Musculoskeletal:  Positive for muscle ache.   Neurological:  Positive for headaches.      Objective:     Physical Exam   Constitutional: He is oriented to person, place, and time. He appears well-developed. He is cooperative.  Non-toxic appearance. He does not appear ill. No distress.      Comments:Patient sits comfortably in exam chair. Answers questions in complete sentences. Does not show any signs of distress or discoloration.        HENT:   Head: Normocephalic and atraumatic.   Ears:   Right Ear: Hearing, tympanic membrane, external ear and ear canal normal. impacted cerumen  Left Ear: Hearing, tympanic membrane, external ear and ear canal normal. impacted cerumen  Nose: Congestion present. No mucosal edema, " rhinorrhea or nasal deformity. No epistaxis. Right sinus exhibits no maxillary sinus tenderness and no frontal sinus tenderness. Left sinus exhibits no maxillary sinus tenderness and no frontal sinus tenderness.   Mouth/Throat: Uvula is midline, oropharynx is clear and moist and mucous membranes are normal. No trismus in the jaw. Normal dentition. No uvula swelling. No oropharyngeal exudate, posterior oropharyngeal edema or posterior oropharyngeal erythema. No tonsillar exudate.   Eyes: Conjunctivae and lids are normal. No scleral icterus.   Neck: Trachea normal and phonation normal. Neck supple. No edema present. No erythema present. No neck rigidity present.   Cardiovascular: Normal rate, regular rhythm, normal heart sounds and normal pulses.   Pulmonary/Chest: Effort normal and breath sounds normal. No stridor. No respiratory distress. He has no decreased breath sounds. He has no wheezes. He has no rhonchi. He has no rales.   Abdominal: Normal appearance.   Musculoskeletal: Normal range of motion.         General: No deformity. Normal range of motion.   Lymphadenopathy:     He has no cervical adenopathy.        Right cervical: No superficial cervical, no deep cervical and no posterior cervical adenopathy present.       Left cervical: No superficial cervical, no deep cervical and no posterior cervical adenopathy present.   Neurological: He is alert and oriented to person, place, and time. He exhibits normal muscle tone. Coordination normal.   Skin: Skin is warm, dry, intact, not diaphoretic and not pale.   Psychiatric: His speech is normal and behavior is normal. Judgment and thought content normal.   Nursing note and vitals reviewed.    Results for orders placed or performed in visit on 03/28/24   SARS Coronavirus 2 Antigen, POCT Manual Read   Result Value Ref Range    SARS Coronavirus 2 Antigen Negative Negative     Acceptable Yes    POCT Influenza A/B MOLECULAR   Result Value Ref Range    POC  Molecular Influenza A Ag Negative Negative, Not Reported    POC Molecular Influenza B Ag Negative Negative, Not Reported     Acceptable Yes      XR CHEST PA AND LATERAL    Result Date: 3/28/2024  EXAMINATION: XR CHEST PA AND LATERAL TECHNIQUE: Two views of the chest were obtained, with PA and lateral projections submitted. COMPARISON: Comparison is made to 09/22/2023.  Clinical information of cough and wheezing is obtained from the electronic medical record. FINDINGS: Heart is not significantly enlarged, and the appearance of the cardiomediastinal silhouette is unchanged since the examination referenced above.  Lung zones are clear, and are free of significant airspace consolidation or volume loss.  No pleural fluid.  No hilar or mediastinal mass lesion.  No pneumothorax or pneumomediastinum.     No significant intrathoracic abnormality.  No significant detrimental interval change in the appearance of the chest since 09/22/2023 is appreciated. Electronically signed by: Kartik Hernandez MD Date:    03/28/2024 Time:    09:39    Assessment:     1. Viral URI with cough    2. Cough, unspecified type        Plan:       Viral URI with cough  -     benzonatate (TESSALON) 200 MG capsule; Take 1 capsule (200 mg total) by mouth 3 (three) times daily as needed for Cough.  Dispense: 30 capsule; Refill: 0    Cough, unspecified type  -     SARS Coronavirus 2 Antigen, POCT Manual Read  -     XR CHEST PA AND LATERAL; Future; Expected date: 03/28/2024  -     POCT Influenza A/B MOLECULAR                Patient Instructions   - Rest.    - Drink plenty of fluids. Increasing your fluid intake will help loosen up mucous.  - Viral upper respiratory infections typically run their course in 10-14 days.      - You can take over-the-counter claritin, zyrtec, allegra, OR xyzal as directed. These are antihistamines that can help with runny nose, nasal congestion, sneezing, and helps to dry up post-nasal drip, which usually causes sore  throat and cough.    - Plain mucinex will help break up congestion and keep it loose.     - You can use Flonase (fluticasone) nasal spray as directed for sinus congestion and postnasal drip. This is a steroid nasal spray that works locally over time to decrease the inflammation in your nose/sinuses and help with allergic symptoms. This is not an quick- relief spray like afrin, but it works well if used daily.  Discontinue if you develop nose bleed  - Use nasal saline prior to Flonase.  - Use Ocean Spray Nasal Saline 1-3 puffs each nostril every 2-3 hours then blow out onto tissue. This is to irrigate the nasal passage way to clear the sinus openings. Use until sinus problem resolved.    - A Neti Pot with sterile saline can help break up nasal congestion and give relief.      - Chloraseptic throat spray can help numb the throat.     - Warm salt water gargles can help with sore throat.  - Warm tea with honey can help with sore throat and cough. Honey is a natural cough suppressant.     - Acetaminophen (tylenol) or Ibuprofen (advil,motrin) as directed as needed for fever/pain. Avoid tylenol if you have a history of liver disease. Do not take ibuprofen if you have a history of GI bleeding, kidney disease, or if you take blood thinners.   - Ibuprofen dosing for adults: 400 mg by mouth every 4-6 hours as needed. Max: 2400 mg/day; Info: use lowest effective dose, shortest effective treatment duration; give w/ food if GI upset occurs.  - Tylenol dosing for adults: [By mouth route, immediate-release form] Dose: 325-1000 mg by mouth every 4-6h as needed; Max: 1 g/4h and 4 g/day from all sources. [By mouth route, extended-release form] Dose: 650-1300 mg Extended Release by mouth every 8h as needed; Max: 4 g/day from all sources.     - You must understand that you have received an Urgent Care treatment only and that you may be released before all of your medical problems are known or treated.   - You, the patient, will arrange  for follow up care as instructed.   - If your condition worsens or fails to improve we recommend that you receive another evaluation at the ER immediately or contact your PCP to discuss your concerns or return here.   - Follow up with your PCP or specialty clinic as directed in the next 1-2 weeks if not improved or as needed.  You can call [phone] to schedule an appointment with the appropriate provider.    If your symptoms do not improve or worsen, go to the emergency room immediately.

## 2025-05-18 NOTE — TELEPHONE ENCOUNTER
.   Nerissa -  TB test is negative.  This confirms that you do not likely have tuberculosis.  Continue plan of bronchoscopy.  Sarah

## (undated) DEVICE — TOWEL OR DISP STRL BLUE 4/PK

## (undated) DEVICE — PAD RADI FEMORAL

## (undated) DEVICE — CATH RESPONSE QPLR JSN 6F 120

## (undated) DEVICE — INTRODUCER HEMOSTASIS 7.5F

## (undated) DEVICE — COVER TABLE 44X90 STERILE

## (undated) DEVICE — KIT PROBE COVER WITH GEL

## (undated) DEVICE — SPIKE CONTRAST CONTROLLER

## (undated) DEVICE — PAD DEFIB CADENCE ADULT R2

## (undated) DEVICE — CABLE COAXIAL UMBILICAL

## (undated) DEVICE — INTRO FAST-CATH SL1 8.5FR 63CM

## (undated) DEVICE — KIT CUSTOM MANIFOLD

## (undated) DEVICE — CATH ACHIEVE ADVANCE 20MM

## (undated) DEVICE — CATH ARCTIC FRONT ADVANCE 28MM

## (undated) DEVICE — R CATH ACUSON ACUNAV 8FR

## (undated) DEVICE — SHEATH HEMOSTASIS 8.5FR

## (undated) DEVICE — PAD GROUND UNIV STYLE CORD 9IN

## (undated) DEVICE — PACK EP DRAPE OMC

## (undated) DEVICE — CABLE ELECTRICAL UMBILICAL

## (undated) DEVICE — SHEATH RAMP FAST CATH 8.5F60CM

## (undated) DEVICE — R CATH TRICSPD HALO XP 7FRX110

## (undated) DEVICE — SHEATH FLEXCATH STEERABLE 12F

## (undated) DEVICE — CATH SAFIRE BI-DIR 7FR LRG

## (undated) DEVICE — CATH TRICUSPID HALO XP 7FRX110

## (undated) DEVICE — CUP MEDICINE STERILE 2OZ

## (undated) DEVICE — VALVE HEMOSTASIS HONOR

## (undated) DEVICE — DILATOR COONS TAPER 14FR

## (undated) DEVICE — KIT ENSITE ELECTRODE SURFACE

## (undated) DEVICE — CATH BIDIRECTIONAL DF CRV 7FR

## (undated) DEVICE — GUIDEWIRE TORAY INOUE

## (undated) DEVICE — R CATH BIDIRECTIONL DF CRV 7FR

## (undated) DEVICE — CATH HIS OCTAPOLAR 7FRX115CM

## (undated) DEVICE — PACK EP DRAPE

## (undated) DEVICE — NDL TRNSSPTL BRK-1 18GA 71CM

## (undated) DEVICE — R CATH HIS OCTAPOLAR 7FRX115CM

## (undated) DEVICE — GAUZE SPONGE 4X4 12PLY